# Patient Record
Sex: MALE | Race: BLACK OR AFRICAN AMERICAN | NOT HISPANIC OR LATINO | Employment: FULL TIME | URBAN - METROPOLITAN AREA
[De-identification: names, ages, dates, MRNs, and addresses within clinical notes are randomized per-mention and may not be internally consistent; named-entity substitution may affect disease eponyms.]

---

## 2021-03-29 ENCOUNTER — TELEPHONE (OUTPATIENT)
Dept: INTERNAL MEDICINE | Facility: CLINIC | Age: 56
End: 2021-03-29

## 2021-03-31 ENCOUNTER — HOSPITAL ENCOUNTER (OUTPATIENT)
Dept: CARDIOLOGY | Facility: HOSPITAL | Age: 56
Discharge: HOME | End: 2021-03-31
Attending: FAMILY MEDICINE
Payer: MEDICAID

## 2021-03-31 ENCOUNTER — IMMUNIZATION (OUTPATIENT)
Dept: INTERNAL MEDICINE | Facility: CLINIC | Age: 56
End: 2021-03-31
Payer: MEDICAID

## 2021-03-31 ENCOUNTER — OFFICE VISIT (OUTPATIENT)
Dept: INTERNAL MEDICINE | Facility: CLINIC | Age: 56
End: 2021-03-31
Payer: MEDICAID

## 2021-03-31 ENCOUNTER — LAB VISIT (OUTPATIENT)
Dept: LAB | Facility: HOSPITAL | Age: 56
End: 2021-03-31
Attending: FAMILY MEDICINE
Payer: MEDICAID

## 2021-03-31 VITALS
WEIGHT: 163.38 LBS | RESPIRATION RATE: 18 BRPM | OXYGEN SATURATION: 97 % | TEMPERATURE: 98 F | HEART RATE: 94 BPM | SYSTOLIC BLOOD PRESSURE: 112 MMHG | HEIGHT: 73 IN | BODY MASS INDEX: 21.65 KG/M2 | DIASTOLIC BLOOD PRESSURE: 64 MMHG

## 2021-03-31 DIAGNOSIS — Z12.11 COLON CANCER SCREENING: ICD-10-CM

## 2021-03-31 DIAGNOSIS — Z23 NEED FOR VACCINATION: Primary | ICD-10-CM

## 2021-03-31 DIAGNOSIS — Z00.00 ROUTINE PHYSICAL EXAMINATION: ICD-10-CM

## 2021-03-31 DIAGNOSIS — R76.8 HEPATITIS C ANTIBODY POSITIVE IN BLOOD: Primary | ICD-10-CM

## 2021-03-31 DIAGNOSIS — Z00.00 ROUTINE PHYSICAL EXAMINATION: Primary | ICD-10-CM

## 2021-03-31 DIAGNOSIS — F10.10 ALCOHOL ABUSE: ICD-10-CM

## 2021-03-31 LAB
ALBUMIN SERPL BCP-MCNC: 3.4 G/DL (ref 3.5–5.2)
ALP SERPL-CCNC: 106 U/L (ref 55–135)
ALT SERPL W/O P-5'-P-CCNC: 79 U/L (ref 10–44)
ANION GAP SERPL CALC-SCNC: 9 MMOL/L (ref 8–16)
AST SERPL-CCNC: 145 U/L (ref 10–40)
BILIRUB SERPL-MCNC: 0.8 MG/DL (ref 0.1–1)
BUN SERPL-MCNC: 5 MG/DL (ref 6–20)
CALCIUM SERPL-MCNC: 9.1 MG/DL (ref 8.7–10.5)
CHLORIDE SERPL-SCNC: 100 MMOL/L (ref 95–110)
CHOLEST SERPL-MCNC: 155 MG/DL (ref 120–199)
CHOLEST/HDLC SERPL: 2.5 {RATIO} (ref 2–5)
CO2 SERPL-SCNC: 28 MMOL/L (ref 23–29)
CREAT SERPL-MCNC: 0.8 MG/DL (ref 0.5–1.4)
EST. GFR  (AFRICAN AMERICAN): >60 ML/MIN/1.73 M^2
EST. GFR  (NON AFRICAN AMERICAN): >60 ML/MIN/1.73 M^2
GLUCOSE SERPL-MCNC: 88 MG/DL (ref 70–110)
HDLC SERPL-MCNC: 63 MG/DL (ref 40–75)
HDLC SERPL: 40.6 % (ref 20–50)
LDLC SERPL CALC-MCNC: 83.6 MG/DL (ref 63–159)
NONHDLC SERPL-MCNC: 92 MG/DL
POTASSIUM SERPL-SCNC: 4.4 MMOL/L (ref 3.5–5.1)
PROT SERPL-MCNC: 8.7 G/DL (ref 6–8.4)
SODIUM SERPL-SCNC: 137 MMOL/L (ref 136–145)
TRIGL SERPL-MCNC: 42 MG/DL (ref 30–150)

## 2021-03-31 PROCEDURE — 86803 HEPATITIS C AB TEST: CPT | Performed by: FAMILY MEDICINE

## 2021-03-31 PROCEDURE — 99386 PR PREVENTIVE VISIT,NEW,40-64: ICD-10-PCS | Mod: S$PBB,,, | Performed by: FAMILY MEDICINE

## 2021-03-31 PROCEDURE — 91300 COVID-19, MRNA, LNP-S, PF, 30 MCG/0.3 ML DOSE VACCINE: CPT | Mod: PBBFAC

## 2021-03-31 PROCEDURE — 99999 PR PBB SHADOW E&M-EST. PATIENT-LVL III: ICD-10-PCS | Mod: PBBFAC,,, | Performed by: FAMILY MEDICINE

## 2021-03-31 PROCEDURE — 80053 COMPREHEN METABOLIC PANEL: CPT | Performed by: FAMILY MEDICINE

## 2021-03-31 PROCEDURE — 93010 ELECTROCARDIOGRAM REPORT: CPT | Mod: ,,, | Performed by: INTERNAL MEDICINE

## 2021-03-31 PROCEDURE — 99213 OFFICE O/P EST LOW 20 MIN: CPT | Mod: PBBFAC,25 | Performed by: FAMILY MEDICINE

## 2021-03-31 PROCEDURE — 36415 COLL VENOUS BLD VENIPUNCTURE: CPT | Performed by: FAMILY MEDICINE

## 2021-03-31 PROCEDURE — 86703 HIV-1/HIV-2 1 RESULT ANTBDY: CPT | Performed by: FAMILY MEDICINE

## 2021-03-31 PROCEDURE — 99386 PREV VISIT NEW AGE 40-64: CPT | Mod: S$PBB,,, | Performed by: FAMILY MEDICINE

## 2021-03-31 PROCEDURE — 93005 ELECTROCARDIOGRAM TRACING: CPT

## 2021-03-31 PROCEDURE — 99999 PR PBB SHADOW E&M-EST. PATIENT-LVL III: CPT | Mod: PBBFAC,,, | Performed by: FAMILY MEDICINE

## 2021-03-31 PROCEDURE — 93010 EKG 12-LEAD: ICD-10-PCS | Mod: ,,, | Performed by: INTERNAL MEDICINE

## 2021-03-31 PROCEDURE — 80061 LIPID PANEL: CPT | Performed by: FAMILY MEDICINE

## 2021-04-01 ENCOUNTER — TELEPHONE (OUTPATIENT)
Dept: ENDOSCOPY | Facility: HOSPITAL | Age: 56
End: 2021-04-01

## 2021-04-01 PROBLEM — F10.10 ALCOHOL ABUSE: Status: ACTIVE | Noted: 2021-04-01

## 2021-04-01 LAB
HCV AB SERPL QL IA: POSITIVE
HIV 1+2 AB+HIV1 P24 AG SERPL QL IA: NEGATIVE

## 2021-04-06 ENCOUNTER — TELEPHONE (OUTPATIENT)
Dept: INTERNAL MEDICINE | Facility: CLINIC | Age: 56
End: 2021-04-06

## 2021-04-21 ENCOUNTER — IMMUNIZATION (OUTPATIENT)
Dept: INTERNAL MEDICINE | Facility: CLINIC | Age: 56
End: 2021-04-21
Payer: MEDICAID

## 2021-04-21 DIAGNOSIS — Z23 NEED FOR VACCINATION: Primary | ICD-10-CM

## 2021-04-21 PROCEDURE — 0002A COVID-19, MRNA, LNP-S, PF, 30 MCG/0.3 ML DOSE VACCINE: CPT | Mod: PBBFAC

## 2021-04-21 PROCEDURE — 91300 COVID-19, MRNA, LNP-S, PF, 30 MCG/0.3 ML DOSE VACCINE: CPT | Mod: PBBFAC

## 2021-06-10 ENCOUNTER — OFFICE VISIT (OUTPATIENT)
Dept: URGENT CARE | Facility: CLINIC | Age: 56
End: 2021-06-10
Payer: MEDICAID

## 2021-06-10 ENCOUNTER — HOSPITAL ENCOUNTER (OUTPATIENT)
Dept: RADIOLOGY | Facility: CLINIC | Age: 56
Discharge: HOME | End: 2021-06-10
Attending: NURSE PRACTITIONER
Payer: MEDICAID

## 2021-06-10 VITALS
TEMPERATURE: 98 F | RESPIRATION RATE: 18 BRPM | BODY MASS INDEX: 22.53 KG/M2 | SYSTOLIC BLOOD PRESSURE: 114 MMHG | DIASTOLIC BLOOD PRESSURE: 62 MMHG | WEIGHT: 170 LBS | HEART RATE: 73 BPM | OXYGEN SATURATION: 97 % | HEIGHT: 73 IN

## 2021-06-10 DIAGNOSIS — M25.552 LEFT HIP PAIN: Primary | ICD-10-CM

## 2021-06-10 PROCEDURE — 73502 XR HIP WITH PELVIS WHEN PERFORMED, 2 OR 3 VIEWS LEFT: ICD-10-PCS | Mod: LT,S$GLB,, | Performed by: RADIOLOGY

## 2021-06-10 PROCEDURE — 73502 X-RAY EXAM HIP UNI 2-3 VIEWS: CPT | Mod: LT,S$GLB,, | Performed by: RADIOLOGY

## 2021-06-10 PROCEDURE — 99214 OFFICE O/P EST MOD 30 MIN: CPT | Mod: S$GLB,,, | Performed by: NURSE PRACTITIONER

## 2021-06-10 PROCEDURE — 99214 PR OFFICE/OUTPT VISIT, EST, LEVL IV, 30-39 MIN: ICD-10-PCS | Mod: S$GLB,,, | Performed by: NURSE PRACTITIONER

## 2021-06-10 RX ORDER — KETOROLAC TROMETHAMINE 30 MG/ML
30 INJECTION, SOLUTION INTRAMUSCULAR; INTRAVENOUS
Status: DISCONTINUED | OUTPATIENT
Start: 2021-06-10 | End: 2021-06-10

## 2021-06-10 RX ORDER — MELOXICAM 7.5 MG/1
7.5 TABLET ORAL DAILY
Qty: 7 TABLET | Refills: 0 | Status: SHIPPED | OUTPATIENT
Start: 2021-06-10 | End: 2021-06-17

## 2021-06-10 RX ORDER — KETOROLAC TROMETHAMINE 30 MG/ML
30 INJECTION, SOLUTION INTRAMUSCULAR; INTRAVENOUS
Status: COMPLETED | OUTPATIENT
Start: 2021-06-10 | End: 2021-06-10

## 2021-06-10 RX ADMIN — KETOROLAC TROMETHAMINE 30 MG: 30 INJECTION, SOLUTION INTRAMUSCULAR; INTRAVENOUS at 12:06

## 2021-06-13 ENCOUNTER — TELEPHONE (OUTPATIENT)
Dept: URGENT CARE | Facility: CLINIC | Age: 56
End: 2021-06-13

## 2021-07-14 ENCOUNTER — TELEPHONE (OUTPATIENT)
Dept: INTERNAL MEDICINE | Facility: CLINIC | Age: 56
End: 2021-07-14

## 2021-07-14 ENCOUNTER — TELEPHONE (OUTPATIENT)
Dept: URGENT CARE | Facility: CLINIC | Age: 56
End: 2021-07-14

## 2021-07-14 ENCOUNTER — TELEPHONE (OUTPATIENT)
Dept: RHEUMATOLOGY | Facility: CLINIC | Age: 56
End: 2021-07-14

## 2021-07-14 ENCOUNTER — OFFICE VISIT (OUTPATIENT)
Dept: URGENT CARE | Facility: CLINIC | Age: 56
End: 2021-07-14
Payer: MEDICAID

## 2021-07-14 VITALS
DIASTOLIC BLOOD PRESSURE: 68 MMHG | OXYGEN SATURATION: 98 % | SYSTOLIC BLOOD PRESSURE: 118 MMHG | TEMPERATURE: 98 F | HEART RATE: 55 BPM | RESPIRATION RATE: 20 BRPM

## 2021-07-14 DIAGNOSIS — M25.559 HIP PAIN: ICD-10-CM

## 2021-07-14 DIAGNOSIS — M46.1 SACROILIITIS: Primary | ICD-10-CM

## 2021-07-14 DIAGNOSIS — L84 CALLUS OF FOOT: Primary | ICD-10-CM

## 2021-07-14 PROBLEM — M25.569 PAIN IN JOINT, LOWER LEG: Status: ACTIVE | Noted: 2017-02-18

## 2021-07-14 PROBLEM — G89.28 CHRONIC POSTOPERATIVE PAIN: Status: ACTIVE | Noted: 2019-11-25

## 2021-07-14 PROBLEM — Z98.890 HISTORY OF CRANIOTOMY: Status: ACTIVE | Noted: 2019-11-25

## 2021-07-14 PROCEDURE — 99213 PR OFFICE/OUTPT VISIT, EST, LEVL III, 20-29 MIN: ICD-10-PCS | Mod: S$GLB,,, | Performed by: EMERGENCY MEDICINE

## 2021-07-14 PROCEDURE — 99213 OFFICE O/P EST LOW 20 MIN: CPT | Mod: S$GLB,,, | Performed by: EMERGENCY MEDICINE

## 2021-07-14 RX ORDER — KETOROLAC TROMETHAMINE 10 MG/1
10 TABLET, FILM COATED ORAL EVERY 6 HOURS
Qty: 20 TABLET | Refills: 0 | Status: SHIPPED | OUTPATIENT
Start: 2021-07-14 | End: 2021-07-19 | Stop reason: SDUPTHER

## 2021-07-14 RX ORDER — KETOROLAC TROMETHAMINE 30 MG/ML
30 INJECTION, SOLUTION INTRAMUSCULAR; INTRAVENOUS
Status: COMPLETED | OUTPATIENT
Start: 2021-07-14 | End: 2021-07-14

## 2021-07-14 RX ORDER — DICLOFENAC SODIUM 10 MG/G
4 GEL TOPICAL 4 TIMES DAILY
Qty: 4 TUBE | Refills: 3 | Status: SHIPPED | OUTPATIENT
Start: 2021-07-14 | End: 2022-04-24 | Stop reason: SDUPTHER

## 2021-07-14 RX ADMIN — KETOROLAC TROMETHAMINE 30 MG: 30 INJECTION, SOLUTION INTRAMUSCULAR; INTRAVENOUS at 12:07

## 2021-07-16 ENCOUNTER — TELEPHONE (OUTPATIENT)
Dept: INTERNAL MEDICINE | Facility: CLINIC | Age: 56
End: 2021-07-16

## 2021-07-19 ENCOUNTER — OFFICE VISIT (OUTPATIENT)
Dept: INTERNAL MEDICINE | Facility: CLINIC | Age: 56
End: 2021-07-19
Payer: MEDICAID

## 2021-07-19 DIAGNOSIS — Z12.5 SCREENING PSA (PROSTATE SPECIFIC ANTIGEN): ICD-10-CM

## 2021-07-19 DIAGNOSIS — R53.83 FATIGUE, UNSPECIFIED TYPE: ICD-10-CM

## 2021-07-19 DIAGNOSIS — R76.8 HEPATITIS C ANTIBODY POSITIVE IN BLOOD: ICD-10-CM

## 2021-07-19 DIAGNOSIS — Z11.3 ROUTINE SCREENING FOR STI (SEXUALLY TRANSMITTED INFECTION): ICD-10-CM

## 2021-07-19 DIAGNOSIS — M19.90 ARTHRITIS: Primary | ICD-10-CM

## 2021-07-19 DIAGNOSIS — R45.86 MOOD CHANGES: ICD-10-CM

## 2021-07-19 DIAGNOSIS — M25.559 HIP PAIN: ICD-10-CM

## 2021-07-19 PROCEDURE — 99213 PR OFFICE/OUTPT VISIT, EST, LEVL III, 20-29 MIN: ICD-10-PCS | Mod: 95,,, | Performed by: FAMILY MEDICINE

## 2021-07-19 PROCEDURE — 99213 OFFICE O/P EST LOW 20 MIN: CPT | Mod: 95,,, | Performed by: FAMILY MEDICINE

## 2021-07-19 RX ORDER — KETOROLAC TROMETHAMINE 10 MG/1
10 TABLET, FILM COATED ORAL 3 TIMES DAILY
Qty: 20 TABLET | Refills: 0 | Status: SHIPPED | OUTPATIENT
Start: 2021-07-19 | End: 2021-07-26

## 2021-07-26 ENCOUNTER — TELEPHONE (OUTPATIENT)
Dept: PODIATRY | Facility: CLINIC | Age: 56
End: 2021-07-26

## 2021-07-26 ENCOUNTER — PATIENT OUTREACH (OUTPATIENT)
Dept: ADMINISTRATIVE | Facility: OTHER | Age: 56
End: 2021-07-26

## 2021-07-26 ENCOUNTER — TELEPHONE (OUTPATIENT)
Dept: GASTROENTEROLOGY | Facility: CLINIC | Age: 56
End: 2021-07-26

## 2021-08-03 ENCOUNTER — TELEPHONE (OUTPATIENT)
Dept: GASTROENTEROLOGY | Facility: CLINIC | Age: 56
End: 2021-08-03

## 2021-08-03 ENCOUNTER — TELEPHONE (OUTPATIENT)
Dept: PODIATRY | Facility: CLINIC | Age: 56
End: 2021-08-03

## 2021-08-26 ENCOUNTER — PATIENT OUTREACH (OUTPATIENT)
Dept: ADMINISTRATIVE | Facility: OTHER | Age: 56
End: 2021-08-26

## 2021-08-27 ENCOUNTER — OFFICE VISIT (OUTPATIENT)
Dept: GASTROENTEROLOGY | Facility: CLINIC | Age: 56
End: 2021-08-27
Payer: MEDICAID

## 2021-08-27 ENCOUNTER — PATIENT MESSAGE (OUTPATIENT)
Dept: GASTROENTEROLOGY | Facility: CLINIC | Age: 56
End: 2021-08-27

## 2021-08-27 ENCOUNTER — LAB VISIT (OUTPATIENT)
Dept: LAB | Facility: HOSPITAL | Age: 56
End: 2021-08-27
Attending: FAMILY MEDICINE
Payer: MEDICAID

## 2021-08-27 ENCOUNTER — SPECIALTY PHARMACY (OUTPATIENT)
Dept: PHARMACY | Facility: CLINIC | Age: 56
End: 2021-08-27

## 2021-08-27 DIAGNOSIS — R76.8 HEPATITIS C ANTIBODY POSITIVE IN BLOOD: ICD-10-CM

## 2021-08-27 DIAGNOSIS — R45.86 MOOD CHANGES: ICD-10-CM

## 2021-08-27 DIAGNOSIS — Z11.3 ROUTINE SCREENING FOR STI (SEXUALLY TRANSMITTED INFECTION): ICD-10-CM

## 2021-08-27 DIAGNOSIS — B18.2 CHRONIC HEPATITIS C WITHOUT HEPATIC COMA: ICD-10-CM

## 2021-08-27 DIAGNOSIS — R53.83 FATIGUE, UNSPECIFIED TYPE: ICD-10-CM

## 2021-08-27 DIAGNOSIS — B19.20 HEPATITIS C VIRUS INFECTION WITHOUT HEPATIC COMA, UNSPECIFIED CHRONICITY: Primary | ICD-10-CM

## 2021-08-27 DIAGNOSIS — Z12.5 SCREENING PSA (PROSTATE SPECIFIC ANTIGEN): ICD-10-CM

## 2021-08-27 LAB — COMPLEXED PSA SERPL-MCNC: 2.9 NG/ML (ref 0–4)

## 2021-08-27 PROCEDURE — 99204 PR OFFICE/OUTPT VISIT, NEW, LEVL IV, 45-59 MIN: ICD-10-PCS | Mod: 95,,, | Performed by: INTERNAL MEDICINE

## 2021-08-27 PROCEDURE — 87522 HEPATITIS C REVRS TRNSCRPJ: CPT | Performed by: FAMILY MEDICINE

## 2021-08-27 PROCEDURE — 36415 COLL VENOUS BLD VENIPUNCTURE: CPT | Performed by: FAMILY MEDICINE

## 2021-08-27 PROCEDURE — 87340 HEPATITIS B SURFACE AG IA: CPT | Performed by: FAMILY MEDICINE

## 2021-08-27 PROCEDURE — 84153 ASSAY OF PSA TOTAL: CPT | Performed by: FAMILY MEDICINE

## 2021-08-27 PROCEDURE — 87902 NFCT AGT GNTYP ALYS HEP C: CPT | Performed by: FAMILY MEDICINE

## 2021-08-27 PROCEDURE — 87350 HEPATITIS BE AG IA: CPT | Performed by: FAMILY MEDICINE

## 2021-08-27 PROCEDURE — 84403 ASSAY OF TOTAL TESTOSTERONE: CPT | Performed by: FAMILY MEDICINE

## 2021-08-27 PROCEDURE — 99204 OFFICE O/P NEW MOD 45 MIN: CPT | Mod: 95,,, | Performed by: INTERNAL MEDICINE

## 2021-08-27 PROCEDURE — 86592 SYPHILIS TEST NON-TREP QUAL: CPT | Performed by: FAMILY MEDICINE

## 2021-08-27 PROCEDURE — 87389 HIV-1 AG W/HIV-1&-2 AB AG IA: CPT | Performed by: FAMILY MEDICINE

## 2021-08-27 RX ORDER — VELPATASVIR AND SOFOSBUVIR 100; 400 MG/1; MG/1
1 TABLET, FILM COATED ORAL DAILY
Qty: 30 TABLET | Refills: 2 | Status: SHIPPED | OUTPATIENT
Start: 2021-08-27 | End: 2021-11-11

## 2021-08-28 LAB — RPR SER QL: NORMAL

## 2021-08-30 LAB — HBV E AG SERPL QL IA: NONREACTIVE

## 2021-08-31 ENCOUNTER — PATIENT MESSAGE (OUTPATIENT)
Dept: PODIATRY | Facility: CLINIC | Age: 56
End: 2021-08-31

## 2021-08-31 LAB
HBV SURFACE AG SERPL QL IA: NEGATIVE
HIV 1+2 AB+HIV1 P24 AG SERPL QL IA: NEGATIVE

## 2021-09-02 LAB
HCV GENTYP SERPL NAA+PROBE: ABNORMAL
HCV RNA SERPL NAA+PROBE-LOG IU: 5.68 LOG (10) IU/ML
HCV RNA SERPL QL NAA+PROBE: DETECTED
HCV RNA SPEC NAA+PROBE-ACNC: ABNORMAL IU/ML

## 2021-09-03 ENCOUNTER — OFFICE VISIT (OUTPATIENT)
Dept: PODIATRY | Facility: CLINIC | Age: 56
End: 2021-09-03
Payer: MEDICAID

## 2021-09-03 ENCOUNTER — PATIENT MESSAGE (OUTPATIENT)
Dept: INTERNAL MEDICINE | Facility: CLINIC | Age: 56
End: 2021-09-03

## 2021-09-03 ENCOUNTER — LAB VISIT (OUTPATIENT)
Dept: LAB | Facility: HOSPITAL | Age: 56
End: 2021-09-03
Attending: INTERNAL MEDICINE
Payer: MEDICAID

## 2021-09-03 ENCOUNTER — PATIENT MESSAGE (OUTPATIENT)
Dept: GASTROENTEROLOGY | Facility: CLINIC | Age: 56
End: 2021-09-03

## 2021-09-03 VITALS — WEIGHT: 170 LBS | HEIGHT: 73 IN | BODY MASS INDEX: 22.53 KG/M2

## 2021-09-03 DIAGNOSIS — R53.83 FATIGUE, UNSPECIFIED TYPE: ICD-10-CM

## 2021-09-03 DIAGNOSIS — L84 CORN OR CALLUS: ICD-10-CM

## 2021-09-03 DIAGNOSIS — B19.20 HEPATITIS C VIRUS INFECTION WITHOUT HEPATIC COMA, UNSPECIFIED CHRONICITY: ICD-10-CM

## 2021-09-03 DIAGNOSIS — R76.8 HEPATITIS C ANTIBODY TEST POSITIVE: Primary | ICD-10-CM

## 2021-09-03 DIAGNOSIS — R45.86 MOOD CHANGES: ICD-10-CM

## 2021-09-03 DIAGNOSIS — M79.672 LEFT FOOT PAIN: Primary | ICD-10-CM

## 2021-09-03 DIAGNOSIS — R79.89 LOW TESTOSTERONE: ICD-10-CM

## 2021-09-03 LAB
ALBUMIN SERPL-MCNC: 3.7 G/DL (ref 3.6–5.1)
BASOPHILS # BLD AUTO: 0.02 K/UL (ref 0–0.2)
BASOPHILS NFR BLD: 0.5 % (ref 0–1.9)
DIFFERENTIAL METHOD: ABNORMAL
EOSINOPHIL # BLD AUTO: 0.5 K/UL (ref 0–0.5)
EOSINOPHIL NFR BLD: 12.5 % (ref 0–8)
ERYTHROCYTE [DISTWIDTH] IN BLOOD BY AUTOMATED COUNT: 13.5 % (ref 11.5–14.5)
HCT VFR BLD AUTO: 36.6 % (ref 40–54)
HEPATITIS C VIRUS (HCV) RNA DETECTION/QUANTIFICATION RT-PCR: ABNORMAL IU/ML
HGB BLD-MCNC: 12.3 G/DL (ref 14–18)
IMM GRANULOCYTES # BLD AUTO: 0.01 K/UL (ref 0–0.04)
IMM GRANULOCYTES NFR BLD AUTO: 0.3 % (ref 0–0.5)
LYMPHOCYTES # BLD AUTO: 2 K/UL (ref 1–4.8)
LYMPHOCYTES NFR BLD: 51.2 % (ref 18–48)
MCH RBC QN AUTO: 33.9 PG (ref 27–31)
MCHC RBC AUTO-ENTMCNC: 33.6 G/DL (ref 32–36)
MCV RBC AUTO: 101 FL (ref 82–98)
MONOCYTES # BLD AUTO: 0.5 K/UL (ref 0.3–1)
MONOCYTES NFR BLD: 13.6 % (ref 4–15)
NEUTROPHILS # BLD AUTO: 0.8 K/UL (ref 1.8–7.7)
NEUTROPHILS NFR BLD: 21.9 % (ref 38–73)
NRBC BLD-RTO: 0 /100 WBC
PLATELET # BLD AUTO: 167 K/UL (ref 150–450)
PLATELET BLD QL SMEAR: ABNORMAL
PMV BLD AUTO: 12.1 FL (ref 9.2–12.9)
RBC # BLD AUTO: 3.63 M/UL (ref 4.6–6.2)
SHBG SERPL-SCNC: 148 NMOL/L (ref 10–50)
TESTOST FREE SERPL-MCNC: 16.4 PG/ML (ref 46–224)
TESTOST SERPL-MCNC: 488 NG/DL (ref 250–1100)
TESTOSTERONE.FREE+WB SERPL-MCNC: 28.1 NG/DL (ref 110–575)
WBC # BLD AUTO: 3.83 K/UL (ref 3.9–12.7)

## 2021-09-03 PROCEDURE — 36415 COLL VENOUS BLD VENIPUNCTURE: CPT | Performed by: INTERNAL MEDICINE

## 2021-09-03 PROCEDURE — 81596 NFCT DS CHRNC HCV 6 ASSAYS: CPT | Performed by: INTERNAL MEDICINE

## 2021-09-03 PROCEDURE — 99999 PR PBB SHADOW E&M-EST. PATIENT-LVL II: CPT | Mod: PBBFAC,,, | Performed by: PODIATRIST

## 2021-09-03 PROCEDURE — 99212 OFFICE O/P EST SF 10 MIN: CPT | Mod: PBBFAC | Performed by: PODIATRIST

## 2021-09-03 PROCEDURE — 99203 PR OFFICE/OUTPT VISIT, NEW, LEVL III, 30-44 MIN: ICD-10-PCS | Mod: S$PBB,,, | Performed by: PODIATRIST

## 2021-09-03 PROCEDURE — 85025 COMPLETE CBC W/AUTO DIFF WBC: CPT | Performed by: INTERNAL MEDICINE

## 2021-09-03 PROCEDURE — 99203 OFFICE O/P NEW LOW 30 MIN: CPT | Mod: S$PBB,,, | Performed by: PODIATRIST

## 2021-09-03 PROCEDURE — 99999 PR PBB SHADOW E&M-EST. PATIENT-LVL II: ICD-10-PCS | Mod: PBBFAC,,, | Performed by: PODIATRIST

## 2021-09-13 ENCOUNTER — SPECIALTY PHARMACY (OUTPATIENT)
Dept: PHARMACY | Facility: CLINIC | Age: 56
End: 2021-09-13

## 2021-09-13 ENCOUNTER — TELEPHONE (OUTPATIENT)
Dept: PHARMACY | Facility: CLINIC | Age: 56
End: 2021-09-13

## 2021-09-13 DIAGNOSIS — B18.2 CHRONIC HEPATITIS C WITHOUT HEPATIC COMA: Primary | ICD-10-CM

## 2021-09-15 LAB
A2 MACROGLOB SERPL-MCNC: 350 MG/DL (ref 100–280)
ALT SERPL W P-5'-P-CCNC: 55 U/L (ref 7–55)
APO A-I SERPL-MCNC: 134 MG/DL
BILIRUB SERPL-MCNC: 0.9 MG/DL
BIOPREDICTIVE SERIAL NUMBER: ABNORMAL
FIBROSIS STAGE SERPL QL: ABNORMAL
FIBROTEST INTERPRETATION: ABNORMAL
FIBROTEST-ACTITEST COMMENT: ABNORMAL
GGT SERPL-CCNC: 184 U/L (ref 8–61)
HAPTOGLOB SERPL-MCNC: 19 MG/DL (ref 30–200)
LIVER FIBR SCORE SERPL CALC.FIBROSURE: 0.92
NECROINFLAMMAT INTERP: ABNORMAL
NECROINFLAMMATORY ACT GRADE SERPL QL: ABNORMAL
NECROINFLAMMATORY ACT SCORE SERPL: 0.55

## 2021-09-17 ENCOUNTER — TELEPHONE (OUTPATIENT)
Dept: INTERNAL MEDICINE | Facility: CLINIC | Age: 56
End: 2021-09-17

## 2021-09-17 DIAGNOSIS — R79.89 LOW TESTOSTERONE: ICD-10-CM

## 2021-09-17 DIAGNOSIS — R53.83 FATIGUE, UNSPECIFIED TYPE: ICD-10-CM

## 2021-09-17 DIAGNOSIS — R45.86 MOOD CHANGES: Primary | ICD-10-CM

## 2021-09-23 ENCOUNTER — PATIENT MESSAGE (OUTPATIENT)
Dept: GASTROENTEROLOGY | Facility: CLINIC | Age: 56
End: 2021-09-23

## 2021-09-23 ENCOUNTER — IMMUNIZATION (OUTPATIENT)
Dept: PRIMARY CARE CLINIC | Facility: CLINIC | Age: 56
End: 2021-09-23
Payer: MEDICAID

## 2021-09-23 DIAGNOSIS — Z23 NEED FOR VACCINATION: Primary | ICD-10-CM

## 2021-09-23 PROCEDURE — 91300 COVID-19, MRNA, LNP-S, PF, 30 MCG/0.3 ML DOSE VACCINE: CPT | Mod: PBBFAC | Performed by: NURSE PRACTITIONER

## 2021-09-23 PROCEDURE — 0003A COVID-19, MRNA, LNP-S, PF, 30 MCG/0.3 ML DOSE VACCINE: CPT | Mod: CV19,PBBFAC | Performed by: NURSE PRACTITIONER

## 2021-09-28 ENCOUNTER — SPECIALTY PHARMACY (OUTPATIENT)
Dept: PHARMACY | Facility: CLINIC | Age: 56
End: 2021-09-28

## 2021-09-28 DIAGNOSIS — B18.2 CHRONIC HEPATITIS C WITHOUT HEPATIC COMA: Primary | ICD-10-CM

## 2021-10-06 ENCOUNTER — PATIENT MESSAGE (OUTPATIENT)
Dept: GASTROENTEROLOGY | Facility: CLINIC | Age: 56
End: 2021-10-06

## 2021-10-06 ENCOUNTER — TELEPHONE (OUTPATIENT)
Dept: UROLOGY | Facility: CLINIC | Age: 56
End: 2021-10-06

## 2021-10-06 ENCOUNTER — PATIENT MESSAGE (OUTPATIENT)
Dept: UROLOGY | Facility: CLINIC | Age: 56
End: 2021-10-06

## 2021-10-08 ENCOUNTER — SPECIALTY PHARMACY (OUTPATIENT)
Dept: PHARMACY | Facility: CLINIC | Age: 56
End: 2021-10-08

## 2021-10-08 DIAGNOSIS — B18.2 CHRONIC HEPATITIS C WITHOUT HEPATIC COMA: Primary | ICD-10-CM

## 2021-10-12 ENCOUNTER — TELEPHONE (OUTPATIENT)
Dept: HEPATOLOGY | Facility: CLINIC | Age: 56
End: 2021-10-12

## 2021-10-20 ENCOUNTER — PATIENT OUTREACH (OUTPATIENT)
Dept: ADMINISTRATIVE | Facility: HOSPITAL | Age: 56
End: 2021-10-20

## 2021-11-02 ENCOUNTER — SPECIALTY PHARMACY (OUTPATIENT)
Dept: PHARMACY | Facility: CLINIC | Age: 56
End: 2021-11-02
Payer: MEDICAID

## 2021-11-02 DIAGNOSIS — B18.2 CHRONIC HEPATITIS C WITHOUT HEPATIC COMA: Primary | ICD-10-CM

## 2021-11-11 ENCOUNTER — PATIENT MESSAGE (OUTPATIENT)
Dept: PHARMACY | Facility: CLINIC | Age: 56
End: 2021-11-11
Payer: MEDICAID

## 2021-11-30 ENCOUNTER — TELEPHONE (OUTPATIENT)
Dept: PODIATRY | Facility: CLINIC | Age: 56
End: 2021-11-30
Payer: MEDICAID

## 2021-12-06 ENCOUNTER — OFFICE VISIT (OUTPATIENT)
Dept: URGENT CARE | Facility: CLINIC | Age: 56
End: 2021-12-06
Payer: MEDICAID

## 2021-12-06 ENCOUNTER — HOSPITAL ENCOUNTER (OUTPATIENT)
Dept: RADIOLOGY | Facility: CLINIC | Age: 56
Discharge: HOME | End: 2021-12-06
Attending: NURSE PRACTITIONER
Payer: MEDICAID

## 2021-12-06 ENCOUNTER — HOSPITAL ENCOUNTER (OUTPATIENT)
Dept: RADIOLOGY | Facility: CLINIC | Age: 56
Discharge: HOME | End: 2021-12-06
Attending: NURSE PRACTITIONER

## 2021-12-06 VITALS
BODY MASS INDEX: 25.18 KG/M2 | WEIGHT: 190 LBS | TEMPERATURE: 99 F | SYSTOLIC BLOOD PRESSURE: 124 MMHG | RESPIRATION RATE: 18 BRPM | OXYGEN SATURATION: 95 % | HEIGHT: 73 IN | HEART RATE: 78 BPM | DIASTOLIC BLOOD PRESSURE: 75 MMHG

## 2021-12-06 DIAGNOSIS — G89.29 CHRONIC PAIN OF BOTH SHOULDERS: Primary | ICD-10-CM

## 2021-12-06 DIAGNOSIS — M25.512 CHRONIC PAIN OF BOTH SHOULDERS: ICD-10-CM

## 2021-12-06 DIAGNOSIS — M25.511 CHRONIC PAIN OF BOTH SHOULDERS: ICD-10-CM

## 2021-12-06 DIAGNOSIS — M25.511 CHRONIC PAIN OF BOTH SHOULDERS: Primary | ICD-10-CM

## 2021-12-06 DIAGNOSIS — S22.42XA CLOSED FRACTURE OF MULTIPLE RIBS OF LEFT SIDE, INITIAL ENCOUNTER: ICD-10-CM

## 2021-12-06 DIAGNOSIS — M25.512 CHRONIC PAIN OF BOTH SHOULDERS: Primary | ICD-10-CM

## 2021-12-06 DIAGNOSIS — G89.29 CHRONIC PAIN OF BOTH SHOULDERS: ICD-10-CM

## 2021-12-06 PROCEDURE — 73030 X-RAY EXAM OF SHOULDER: CPT | Mod: RT,S$GLB,, | Performed by: RADIOLOGY

## 2021-12-06 PROCEDURE — 73030 X-RAY EXAM OF SHOULDER: CPT | Mod: LT,S$GLB,, | Performed by: RADIOLOGY

## 2021-12-06 PROCEDURE — 99214 OFFICE O/P EST MOD 30 MIN: CPT | Mod: S$GLB,,, | Performed by: NURSE PRACTITIONER

## 2021-12-06 PROCEDURE — 73030 XR SHOULDER COMPLETE 2 OR MORE VIEWS LEFT: ICD-10-PCS | Mod: LT,S$GLB,, | Performed by: RADIOLOGY

## 2021-12-06 PROCEDURE — 99214 PR OFFICE/OUTPT VISIT, EST, LEVL IV, 30-39 MIN: ICD-10-PCS | Mod: S$GLB,,, | Performed by: NURSE PRACTITIONER

## 2021-12-06 PROCEDURE — 73030 XR SHOULDER COMPLETE 2 OR MORE VIEWS RIGHT: ICD-10-PCS | Mod: RT,S$GLB,, | Performed by: RADIOLOGY

## 2021-12-06 RX ORDER — KETOROLAC TROMETHAMINE 30 MG/ML
30 INJECTION, SOLUTION INTRAMUSCULAR; INTRAVENOUS
Status: COMPLETED | OUTPATIENT
Start: 2021-12-06 | End: 2021-12-06

## 2021-12-06 RX ADMIN — KETOROLAC TROMETHAMINE 30 MG: 30 INJECTION, SOLUTION INTRAMUSCULAR; INTRAVENOUS at 02:12

## 2021-12-09 ENCOUNTER — PATIENT MESSAGE (OUTPATIENT)
Dept: SPORTS MEDICINE | Facility: CLINIC | Age: 56
End: 2021-12-09
Payer: MEDICAID

## 2021-12-09 ENCOUNTER — TELEPHONE (OUTPATIENT)
Dept: SPORTS MEDICINE | Facility: CLINIC | Age: 56
End: 2021-12-09
Payer: MEDICAID

## 2021-12-09 ENCOUNTER — TELEPHONE (OUTPATIENT)
Dept: INTERNAL MEDICINE | Facility: CLINIC | Age: 56
End: 2021-12-09
Payer: MEDICAID

## 2021-12-09 ENCOUNTER — TELEPHONE (OUTPATIENT)
Dept: URGENT CARE | Facility: CLINIC | Age: 56
End: 2021-12-09
Payer: MEDICAID

## 2021-12-09 DIAGNOSIS — M25.511 BILATERAL SHOULDER PAIN, UNSPECIFIED CHRONICITY: Primary | ICD-10-CM

## 2021-12-09 DIAGNOSIS — M25.512 BILATERAL SHOULDER PAIN, UNSPECIFIED CHRONICITY: Primary | ICD-10-CM

## 2021-12-09 RX ORDER — TRAMADOL HYDROCHLORIDE 50 MG/1
50 TABLET ORAL EVERY 6 HOURS
Qty: 21 EACH | Refills: 0 | Status: SHIPPED | OUTPATIENT
Start: 2021-12-09 | End: 2023-01-23

## 2022-01-20 ENCOUNTER — TELEPHONE (OUTPATIENT)
Dept: PODIATRY | Facility: CLINIC | Age: 57
End: 2022-01-20
Payer: MEDICAID

## 2022-01-20 NOTE — TELEPHONE ENCOUNTER
----- Message from Janee Mary sent at 1/20/2022  2:02 PM CST -----  Contact: Pt @[phone]  Patient is returning a phone call.  Who left a message for the patient:  Lazara     Does patient know what this is regarding:  Yes     Would you like a call back, or a response through your [facility] portal?:   Call back     Comments:    Pt states that she is returning a missed call and requesting a call back.

## 2022-01-21 ENCOUNTER — PATIENT OUTREACH (OUTPATIENT)
Dept: ADMINISTRATIVE | Facility: OTHER | Age: 57
End: 2022-01-21
Payer: MEDICAID

## 2022-04-24 ENCOUNTER — HOSPITAL ENCOUNTER (OUTPATIENT)
Dept: RADIOLOGY | Facility: CLINIC | Age: 57
Discharge: HOME | End: 2022-04-24
Attending: PHYSICIAN ASSISTANT

## 2022-04-24 ENCOUNTER — OFFICE VISIT (OUTPATIENT)
Dept: URGENT CARE | Facility: CLINIC | Age: 57
End: 2022-04-24
Payer: MEDICAID

## 2022-04-24 VITALS
HEIGHT: 73 IN | HEART RATE: 73 BPM | WEIGHT: 185 LBS | TEMPERATURE: 99 F | OXYGEN SATURATION: 99 % | SYSTOLIC BLOOD PRESSURE: 110 MMHG | RESPIRATION RATE: 18 BRPM | BODY MASS INDEX: 24.52 KG/M2 | DIASTOLIC BLOOD PRESSURE: 69 MMHG

## 2022-04-24 DIAGNOSIS — G89.29 CHRONIC LEFT SHOULDER PAIN: ICD-10-CM

## 2022-04-24 DIAGNOSIS — G89.29 CHRONIC LEFT SHOULDER PAIN: Primary | ICD-10-CM

## 2022-04-24 DIAGNOSIS — M25.512 CHRONIC LEFT SHOULDER PAIN: ICD-10-CM

## 2022-04-24 DIAGNOSIS — L84 CALLUS OF FOOT: ICD-10-CM

## 2022-04-24 DIAGNOSIS — M25.512 CHRONIC LEFT SHOULDER PAIN: Primary | ICD-10-CM

## 2022-04-24 PROCEDURE — 99214 OFFICE O/P EST MOD 30 MIN: CPT | Mod: S$GLB,,, | Performed by: PHYSICIAN ASSISTANT

## 2022-04-24 PROCEDURE — 3008F PR BODY MASS INDEX (BMI) DOCUMENTED: ICD-10-PCS | Mod: CPTII,S$GLB,, | Performed by: PHYSICIAN ASSISTANT

## 2022-04-24 PROCEDURE — 1159F PR MEDICATION LIST DOCUMENTED IN MEDICAL RECORD: ICD-10-PCS | Mod: CPTII,S$GLB,, | Performed by: PHYSICIAN ASSISTANT

## 2022-04-24 PROCEDURE — 3078F DIAST BP <80 MM HG: CPT | Mod: CPTII,S$GLB,, | Performed by: PHYSICIAN ASSISTANT

## 2022-04-24 PROCEDURE — 1159F MED LIST DOCD IN RCRD: CPT | Mod: CPTII,S$GLB,, | Performed by: PHYSICIAN ASSISTANT

## 2022-04-24 PROCEDURE — 3078F PR MOST RECENT DIASTOLIC BLOOD PRESSURE < 80 MM HG: ICD-10-PCS | Mod: CPTII,S$GLB,, | Performed by: PHYSICIAN ASSISTANT

## 2022-04-24 PROCEDURE — 3074F SYST BP LT 130 MM HG: CPT | Mod: CPTII,S$GLB,, | Performed by: PHYSICIAN ASSISTANT

## 2022-04-24 PROCEDURE — 73030 XR SHOULDER COMPLETE 2 OR MORE VIEWS LEFT: ICD-10-PCS | Mod: LT,S$GLB,, | Performed by: RADIOLOGY

## 2022-04-24 PROCEDURE — 99214 PR OFFICE/OUTPT VISIT, EST, LEVL IV, 30-39 MIN: ICD-10-PCS | Mod: S$GLB,,, | Performed by: PHYSICIAN ASSISTANT

## 2022-04-24 PROCEDURE — 3074F PR MOST RECENT SYSTOLIC BLOOD PRESSURE < 130 MM HG: ICD-10-PCS | Mod: CPTII,S$GLB,, | Performed by: PHYSICIAN ASSISTANT

## 2022-04-24 PROCEDURE — 1160F PR REVIEW ALL MEDS BY PRESCRIBER/CLIN PHARMACIST DOCUMENTED: ICD-10-PCS | Mod: CPTII,S$GLB,, | Performed by: PHYSICIAN ASSISTANT

## 2022-04-24 PROCEDURE — 3008F BODY MASS INDEX DOCD: CPT | Mod: CPTII,S$GLB,, | Performed by: PHYSICIAN ASSISTANT

## 2022-04-24 PROCEDURE — 1160F RVW MEDS BY RX/DR IN RCRD: CPT | Mod: CPTII,S$GLB,, | Performed by: PHYSICIAN ASSISTANT

## 2022-04-24 PROCEDURE — 73030 X-RAY EXAM OF SHOULDER: CPT | Mod: LT,S$GLB,, | Performed by: RADIOLOGY

## 2022-04-24 RX ORDER — KETOROLAC TROMETHAMINE 30 MG/ML
30 INJECTION, SOLUTION INTRAMUSCULAR; INTRAVENOUS
Status: COMPLETED | OUTPATIENT
Start: 2022-04-24 | End: 2022-04-24

## 2022-04-24 RX ORDER — DICLOFENAC SODIUM 10 MG/G
4 GEL TOPICAL 4 TIMES DAILY
Qty: 4 EACH | Refills: 3 | Status: SHIPPED | OUTPATIENT
Start: 2022-04-24 | End: 2024-02-12

## 2022-04-24 RX ADMIN — KETOROLAC TROMETHAMINE 30 MG: 30 INJECTION, SOLUTION INTRAMUSCULAR; INTRAVENOUS at 01:04

## 2022-04-24 NOTE — PROGRESS NOTES
"Subjective:       Patient ID: Madhav Ruiz is a 56 y.o. male.    Vitals:  height is 6' 1" (1.854 m) and weight is 83.9 kg (185 lb). His temperature is 98.7 °F (37.1 °C). His blood pressure is 110/69 and his pulse is 73. His respiration is 18 and oxygen saturation is 99%.     Chief Complaint: Foot Swelling (Left foot)    Pt present for chronic calluses but has gotten worse these past two weeks. Pt states that he is having difficulty walking.  He is also experiencing increased left shoulder pain.  No trauma or injury to foot or shoulder.  He states he has tried Ibuprofen for pain with no relief.  Denies numbness or tingling.    Chart review shows missed or cancelled appts with Podiatry.  Ortho referral sent but patient unable to schedule due to personal schedule.    Edema  This is a new problem. The current episode started more than 1 month ago. The problem occurs constantly. The problem has been gradually worsening. Associated symptoms include arthralgias. Pertinent negatives include no abdominal pain, anorexia, change in bowel habit, chest pain, chills, congestion, coughing, diaphoresis, fatigue, fever, headaches, joint swelling, myalgias, nausea, neck pain, numbness, rash, sore throat, swollen glands, urinary symptoms, vertigo, visual change, vomiting or weakness. The symptoms are aggravated by walking.       Constitution: Negative for chills, sweating, fatigue and fever.   HENT: Negative for congestion and sore throat.    Neck: Negative for neck pain.   Cardiovascular: Negative for chest pain.   Respiratory: Negative for cough.    Gastrointestinal: Negative for abdominal pain, nausea and vomiting.   Musculoskeletal: Positive for joint pain, abnormal ROM of joint and arthritis. Negative for trauma, joint swelling and muscle ache.   Skin: Positive for skin thickening/induration. Negative for rash.   Neurological: Negative for history of vertigo, headaches and numbness.       Objective:      Physical Exam "   Constitutional: He is oriented to person, place, and time. He appears well-developed. He is cooperative. No distress.   HENT:   Head: Normocephalic and atraumatic.   Nose: Nose normal.   Mouth/Throat: Oropharynx is clear and moist and mucous membranes are normal.   Eyes: Conjunctivae and lids are normal.   Neck: Trachea normal and phonation normal. Neck supple.   Cardiovascular: Normal rate, regular rhythm and normal pulses.   Pulmonary/Chest: No respiratory distress.   Abdominal: Normal appearance. He exhibits no abdominal bruit and no pulsatile midline mass.   Musculoskeletal:         General: No deformity.      Left shoulder: He exhibits decreased range of motion and tenderness. He exhibits no swelling, no deformity, normal pulse and normal strength.        Feet:    Neurological: He is alert and oriented to person, place, and time. He has normal strength and normal reflexes. No sensory deficit.   Skin: Skin is warm, dry, intact and not diaphoretic.   Psychiatric: His speech is normal and behavior is normal. Judgment and thought content normal.   Nursing note and vitals reviewed.        Assessment:       1. Chronic left shoulder pain    2. Callus of foot          Plan:         Chronic left shoulder pain  -     X-Ray Shoulder 2 or More Views Left; Future; Expected date: 04/24/2022  -     ketorolac injection 30 mg  -     Ambulatory referral/consult to Orthopedics  -     diclofenac sodium (VOLTAREN) 1 % Gel; Apply 4 g topically 4 (four) times daily.  Dispense: 4 each; Refill: 3    Callus of foot  -     ketorolac injection 30 mg         X-Ray Shoulder 2 or More Views Left    Result Date: 4/24/2022  EXAMINATION: XR SHOULDER COMPLETE 2 OR MORE VIEWS LEFT CLINICAL HISTORY: Pain in left shoulder TECHNIQUE: Two or three views of the left shoulder were performed. COMPARISON: Left shoulder series 12/06/2021 FINDINGS: Bones are well mineralized. Overall alignment is within normal limits. No displaced fracture, dislocation  or destructive osseous process.  Moderate degenerative change at the AC joint not simply progressed.  Minimal degenerative change at the inferior glenoid.  No subcutaneous emphysema or radiodense retained foreign body.  Left lung apex is clear.     No acute displaced fracture-dislocation identified. Electronically signed by: Musa Vazquez MD Date:    04/24/2022 Time:    13:40    Toradol IM given in clinic today for pain control.  No NSAIDs for 24 hours.  Voltaren gel sent to pharmacy for joint pain.  Follow up with podiatry.  Ortho referral sent for shoulder pain.  Phone number updated.  RTC or go to ER with new or worsening symptoms.

## 2022-04-25 ENCOUNTER — TELEPHONE (OUTPATIENT)
Dept: PODIATRY | Facility: CLINIC | Age: 57
End: 2022-04-25
Payer: MEDICAID

## 2022-04-25 ENCOUNTER — PATIENT MESSAGE (OUTPATIENT)
Dept: PODIATRY | Facility: CLINIC | Age: 57
End: 2022-04-25
Payer: MEDICAID

## 2022-04-27 ENCOUNTER — TELEPHONE (OUTPATIENT)
Dept: URGENT CARE | Facility: CLINIC | Age: 57
End: 2022-04-27
Payer: MEDICAID

## 2022-04-27 NOTE — TELEPHONE ENCOUNTER
----- Message from Piper Hodges MA sent at 4/25/2022  7:40 PM CDT -----  Patient called regarding medication sent over for shoulder pain, said he already have some want to know if he could get something stronger for the pain.     4/27/2022 at 923 am  Telephone call:  Unable to leave a VM due to mailbox being full.    Patient will need to see ortho for any additional medications for shoulder pain.    Bang Moore PA-C

## 2022-04-30 ENCOUNTER — PATIENT OUTREACH (OUTPATIENT)
Dept: ADMINISTRATIVE | Facility: OTHER | Age: 57
End: 2022-04-30
Payer: MEDICAID

## 2022-04-30 NOTE — PROGRESS NOTES
Health Maintenance Due   Topic Date Due    Pneumococcal Vaccines (Age 0-64) (1 - PCV) Never done    TETANUS VACCINE  Never done    Colorectal Cancer Screening  Never done    Shingles Vaccine (1 of 2) Never done    Influenza Vaccine (1) Never done    COVID-19 Vaccine (4 - Booster for Pfizer series) 12/23/2021     Updates were requested from care everywhere.  Chart was reviewed for overdue [facility]active [facility] Encounters ([facility]) topics (CRS, Breast Cancer Screening, Eye exam)  Health Maintenance has been updated.  LINKS immunization registry triggered.  Immunizations were reconciled.

## 2022-05-02 ENCOUNTER — OFFICE VISIT (OUTPATIENT)
Dept: PODIATRY | Facility: CLINIC | Age: 57
End: 2022-05-02
Payer: MEDICAID

## 2022-05-02 ENCOUNTER — PATIENT MESSAGE (OUTPATIENT)
Dept: INTERNAL MEDICINE | Facility: CLINIC | Age: 57
End: 2022-05-02
Payer: MEDICAID

## 2022-05-02 VITALS — HEIGHT: 73 IN | WEIGHT: 184.94 LBS | BODY MASS INDEX: 24.51 KG/M2

## 2022-05-02 DIAGNOSIS — M77.42 METATARSALGIA OF LEFT FOOT: ICD-10-CM

## 2022-05-02 DIAGNOSIS — M79.672 LEFT FOOT PAIN: Primary | ICD-10-CM

## 2022-05-02 DIAGNOSIS — L84 CORN OR CALLUS: ICD-10-CM

## 2022-05-02 PROCEDURE — 1160F RVW MEDS BY RX/DR IN RCRD: CPT | Mod: CPTII,,, | Performed by: PODIATRIST

## 2022-05-02 PROCEDURE — 1160F PR REVIEW ALL MEDS BY PRESCRIBER/CLIN PHARMACIST DOCUMENTED: ICD-10-PCS | Mod: CPTII,,, | Performed by: PODIATRIST

## 2022-05-02 PROCEDURE — 99999 PR PBB SHADOW E&M-EST. PATIENT-LVL II: CPT | Mod: PBBFAC,,, | Performed by: PODIATRIST

## 2022-05-02 PROCEDURE — 3008F PR BODY MASS INDEX (BMI) DOCUMENTED: ICD-10-PCS | Mod: CPTII,,, | Performed by: PODIATRIST

## 2022-05-02 PROCEDURE — 1159F MED LIST DOCD IN RCRD: CPT | Mod: CPTII,,, | Performed by: PODIATRIST

## 2022-05-02 PROCEDURE — 3008F BODY MASS INDEX DOCD: CPT | Mod: CPTII,,, | Performed by: PODIATRIST

## 2022-05-02 PROCEDURE — 99213 PR OFFICE/OUTPT VISIT, EST, LEVL III, 20-29 MIN: ICD-10-PCS | Mod: S$PBB,,, | Performed by: PODIATRIST

## 2022-05-02 PROCEDURE — 99212 OFFICE O/P EST SF 10 MIN: CPT | Mod: PBBFAC | Performed by: PODIATRIST

## 2022-05-02 PROCEDURE — 1159F PR MEDICATION LIST DOCUMENTED IN MEDICAL RECORD: ICD-10-PCS | Mod: CPTII,,, | Performed by: PODIATRIST

## 2022-05-02 PROCEDURE — 99999 PR PBB SHADOW E&M-EST. PATIENT-LVL II: ICD-10-PCS | Mod: PBBFAC,,, | Performed by: PODIATRIST

## 2022-05-02 PROCEDURE — 99213 OFFICE O/P EST LOW 20 MIN: CPT | Mod: S$PBB,,, | Performed by: PODIATRIST

## 2022-05-02 NOTE — PROGRESS NOTES
"  Subjective:       Patient ID: Madhav Ruiz is a 57 y.o. male.    Chief Complaint: Callouses (C/o callous on bottom of left foot, rates pain 10/10, nondiabetic, wearing socks and tennis shoes, last seen PCP Dr. Chávez on 07/19/2021. )    HPI: Madhav Ruiz presents to the office today, with areas of concern to the plantar aspect of the right left foot.  States that it feels as if he is walking on rocks.  Denies any puncture wounds or injuries.  States this pain is been ongoing for some time without significant improvement.  No specific memory of walking barefoot or causing injury.  States 10/10 pain.  States visualization of new developed lesions to the foot.  No signs of acute infection.    Review of patient's allergies indicates:  No Known Allergies    Past Medical History:   Diagnosis Date    History of alcohol abuse        History reviewed. No pertinent family history.    Social History     Socioeconomic History    Marital status:    Tobacco Use    Smoking status: Current Every Day Smoker     Packs/day: 0.50     Types: Cigarettes    Smokeless tobacco: Never Used   Substance and Sexual Activity    Alcohol use: Yes     Alcohol/week: 20.0 standard drinks     Types: 20 Shots of liquor per week    Drug use: Never    Sexual activity: Yes     Partners: Female       Past Surgical History:   Procedure Laterality Date    head procedure         Review of Systems       Objective:   Ht 6' 1" (1.854 m)   Wt 83.9 kg (184 lb 15.5 oz)   BMI 24.40 kg/m²     X-Ray Shoulder 2 or More Views Left  Narrative: EXAMINATION:  XR SHOULDER COMPLETE 2 OR MORE VIEWS LEFT    CLINICAL HISTORY:  Pain in left shoulder    TECHNIQUE:  Two or three views of the left shoulder were performed.    COMPARISON:  Left shoulder series 12/06/2021    FINDINGS:  Bones are well mineralized. Overall alignment is within normal limits. No displaced fracture, dislocation or destructive osseous process.  Moderate degenerative change at the AC joint not " simply progressed.  Minimal degenerative change at the inferior glenoid.  No subcutaneous emphysema or radiodense retained foreign body.  Left lung apex is clear.  Impression: No acute displaced fracture-dislocation identified.    Electronically signed by: Musa Vazquez MD  Date:    04/24/2022  Time:    13:40       Physical Exam   LOWER EXTREMITY PHYSICAL EXAMINATION    Vascular:  The right dorsalis pedis pulse 2/4 and the right posterior tibial pulse 2/4.  The left dorsalis pedis pulse 2/4 and posterior tibial pulse on the left is 2/4.  Capillary refill is intact.  Pedal hair growth intact     Neurological:  Sharp/dull, light touch, proprioception all intact and equal bilaterally.  Vibratory sensation is intact.  Protective sensation intact     Musculoskeletal: Manual Muscle Testing is 5/5 with dorsiflexion, plantar flexion, abduction, and adduction.   There is normal range of motion in the forefoot, hindfoot, and Ankle joint.  Pain on palpation of the plantar aspect of the 1st and 2nd metatarsophalangeal joint on the left foot.  Pain with range of motion.  Pain noted plantarly on palpation of the foot.  Dermatological:  Skin is supple and moist.  There is 1 lesion present to the plantar aspect of the foot which have a resemblance to a plantar porokeratosis.  Centralize core is noted.  There is no acute signs of infection.  No signs of underlying ulceration.  There is no absence of skin line.  No obvious capillary hemorrhaging.    Assessment:     1. Left foot pain    2. Metatarsalgia of left foot    3. Corn or callus        Plan:     Left foot pain    Metatarsalgia of left foot    Corn or callus      Thorough discussion is had with the patient today, concerning the diagnosis, its etiology, and the treatment algorithm at present.    Foot counseling and education is provided at this visit. Patient is advised to wear socks and shoes at all times.  Do not walk barefoot, or with just socks, even when indoors.  Be sure to  check and inspect the inside of the shoe before putting them on her feet.  Protect your feet at all times.  Walking shoes and/or athletic shoes are the best types of shoe gear. Do not wear vinyl or plastic type shoe gear, as they do not stretch and/or breathe.  Protect your feet from hot and/or cold. Elevate the extremities when sitting.  Do not wear excessively tight socks and/or shoe gear. Wiggle your toes for a few minutes throughout the day. Move your ankles up and down, in and out, to help blood flow in your lower extremity.    Discussed the importance of elevating the area to prevent recurrent pressure to this area.  Also discussed the importance of offloading the metatarsal heads and porokeratotic lesion.        No future appointments.

## 2022-05-10 ENCOUNTER — PATIENT MESSAGE (OUTPATIENT)
Dept: ORTHOPEDICS | Facility: CLINIC | Age: 57
End: 2022-05-10
Payer: MEDICAID

## 2022-05-13 ENCOUNTER — PATIENT MESSAGE (OUTPATIENT)
Dept: ORTHOPEDICS | Facility: CLINIC | Age: 57
End: 2022-05-13
Payer: MEDICAID

## 2022-06-15 ENCOUNTER — TELEPHONE (OUTPATIENT)
Dept: PODIATRY | Facility: CLINIC | Age: 57
End: 2022-06-15
Payer: MEDICAID

## 2022-09-19 ENCOUNTER — PATIENT OUTREACH (OUTPATIENT)
Dept: ADMINISTRATIVE | Facility: HOSPITAL | Age: 57
End: 2022-09-19
Payer: MEDICAID

## 2022-10-12 ENCOUNTER — TELEPHONE (OUTPATIENT)
Dept: ORTHOPEDICS | Facility: CLINIC | Age: 57
End: 2022-10-12
Payer: MEDICAID

## 2022-10-12 DIAGNOSIS — M25.561 PAIN IN BOTH KNEES, UNSPECIFIED CHRONICITY: Primary | ICD-10-CM

## 2022-10-12 DIAGNOSIS — M25.562 PAIN IN BOTH KNEES, UNSPECIFIED CHRONICITY: Primary | ICD-10-CM

## 2022-10-13 ENCOUNTER — OFFICE VISIT (OUTPATIENT)
Dept: ORTHOPEDICS | Facility: CLINIC | Age: 57
End: 2022-10-13
Payer: MEDICAID

## 2022-10-13 ENCOUNTER — HOSPITAL ENCOUNTER (OUTPATIENT)
Dept: RADIOLOGY | Facility: HOSPITAL | Age: 57
Discharge: HOME | End: 2022-10-13
Attending: STUDENT IN AN ORGANIZED HEALTH CARE EDUCATION/TRAINING PROGRAM
Payer: MEDICAID

## 2022-10-13 ENCOUNTER — TELEPHONE (OUTPATIENT)
Dept: INTERNAL MEDICINE | Facility: CLINIC | Age: 57
End: 2022-10-13
Payer: MEDICAID

## 2022-10-13 VITALS — BODY MASS INDEX: 24.39 KG/M2 | HEIGHT: 73 IN | WEIGHT: 184 LBS

## 2022-10-13 DIAGNOSIS — M17.0 PRIMARY OSTEOARTHRITIS OF BOTH KNEES: Primary | ICD-10-CM

## 2022-10-13 DIAGNOSIS — M25.562 PAIN IN BOTH KNEES, UNSPECIFIED CHRONICITY: ICD-10-CM

## 2022-10-13 DIAGNOSIS — M25.561 PAIN IN BOTH KNEES, UNSPECIFIED CHRONICITY: ICD-10-CM

## 2022-10-13 PROCEDURE — 99213 OFFICE O/P EST LOW 20 MIN: CPT | Mod: PBBFAC,25 | Performed by: STUDENT IN AN ORGANIZED HEALTH CARE EDUCATION/TRAINING PROGRAM

## 2022-10-13 PROCEDURE — 1160F RVW MEDS BY RX/DR IN RCRD: CPT | Mod: CPTII,,, | Performed by: STUDENT IN AN ORGANIZED HEALTH CARE EDUCATION/TRAINING PROGRAM

## 2022-10-13 PROCEDURE — 20610 DRAIN/INJ JOINT/BURSA W/O US: CPT | Mod: 50,PBBFAC | Performed by: STUDENT IN AN ORGANIZED HEALTH CARE EDUCATION/TRAINING PROGRAM

## 2022-10-13 PROCEDURE — 73564 X-RAY EXAM KNEE 4 OR MORE: CPT | Mod: TC,50

## 2022-10-13 PROCEDURE — 99999 PR PBB SHADOW E&M-EST. PATIENT-LVL III: ICD-10-PCS | Mod: PBBFAC,,, | Performed by: STUDENT IN AN ORGANIZED HEALTH CARE EDUCATION/TRAINING PROGRAM

## 2022-10-13 PROCEDURE — 1160F PR REVIEW ALL MEDS BY PRESCRIBER/CLIN PHARMACIST DOCUMENTED: ICD-10-PCS | Mod: CPTII,,, | Performed by: STUDENT IN AN ORGANIZED HEALTH CARE EDUCATION/TRAINING PROGRAM

## 2022-10-13 PROCEDURE — 20610 LARGE JOINT ASPIRATION/INJECTION: BILATERAL KNEE: ICD-10-PCS | Mod: 50,S$PBB,, | Performed by: STUDENT IN AN ORGANIZED HEALTH CARE EDUCATION/TRAINING PROGRAM

## 2022-10-13 PROCEDURE — 1159F PR MEDICATION LIST DOCUMENTED IN MEDICAL RECORD: ICD-10-PCS | Mod: CPTII,,, | Performed by: STUDENT IN AN ORGANIZED HEALTH CARE EDUCATION/TRAINING PROGRAM

## 2022-10-13 PROCEDURE — 99204 OFFICE O/P NEW MOD 45 MIN: CPT | Mod: S$PBB,25,, | Performed by: STUDENT IN AN ORGANIZED HEALTH CARE EDUCATION/TRAINING PROGRAM

## 2022-10-13 PROCEDURE — 1159F MED LIST DOCD IN RCRD: CPT | Mod: CPTII,,, | Performed by: STUDENT IN AN ORGANIZED HEALTH CARE EDUCATION/TRAINING PROGRAM

## 2022-10-13 PROCEDURE — 99204 PR OFFICE/OUTPT VISIT, NEW, LEVL IV, 45-59 MIN: ICD-10-PCS | Mod: S$PBB,25,, | Performed by: STUDENT IN AN ORGANIZED HEALTH CARE EDUCATION/TRAINING PROGRAM

## 2022-10-13 PROCEDURE — 99999 PR PBB SHADOW E&M-EST. PATIENT-LVL III: CPT | Mod: PBBFAC,,, | Performed by: STUDENT IN AN ORGANIZED HEALTH CARE EDUCATION/TRAINING PROGRAM

## 2022-10-13 PROCEDURE — 73564 XR KNEE ORTHO BILAT WITH FLEXION: ICD-10-PCS | Mod: 26,50,, | Performed by: RADIOLOGY

## 2022-10-13 PROCEDURE — 73564 X-RAY EXAM KNEE 4 OR MORE: CPT | Mod: 26,50,, | Performed by: RADIOLOGY

## 2022-10-13 RX ORDER — TRIAMCINOLONE ACETONIDE 40 MG/ML
40 INJECTION, SUSPENSION INTRA-ARTICULAR; INTRAMUSCULAR
Status: DISCONTINUED | OUTPATIENT
Start: 2022-10-13 | End: 2022-10-13 | Stop reason: HOSPADM

## 2022-10-13 RX ADMIN — TRIAMCINOLONE ACETONIDE 40 MG: 200 INJECTION, SUSPENSION INTRA-ARTICULAR; INTRAMUSCULAR at 11:10

## 2022-10-13 NOTE — PATIENT INSTRUCTIONS
Assessment:  Madahv Ruiz is a 57 y.o. male   Chief Complaint   Patient presents with    Left Knee - Pain    Right Knee - Pain       Encounter Diagnosis   Name Primary?    Primary osteoarthritis of both knees Yes        Plan:  Cortisone injection to bilateral knees  We discussed the proper protocols after the injection such as no submerging pools, baths tubs, or hot tubs for 24 hr.  Showering is okay today.  We also discussed that blood sugars can be elevated after an injection and asked patient to properly checked her sugars over the next few days and contact their PCP if there are any concerns.  We discussed red flags such as fevers, chills, red, warm, tender joint at the area of injection to please seek medical care immediately.    Prior authorization for bilateral Durolane injections  Referral to Dr. Cavazos for consultation for TKA    Follow-up: 4 weeks or sooner if there are any problems between now and then.    Thank you for choosing [facility] and Dr. Noah Rae for your orthopedic & sports medicine care. It is our goal to provide you with exceptional care that will help keep you healthy, active, and get you back in the game.    Please do not hesitate to reach out to us via email, phone, or MyChart with any questions, concerns, or feedback.    If you felt that you received exemplary care today, please consider leaving us feedback on [employer] at:  [email or web address]    If you are experiencing pain/discomfort ,or have questions after 5pm and would like to be connected to the [facility] on-call team, please call this number and specify which Sports Medicine provider is treating you: [phone]

## 2022-10-13 NOTE — PROGRESS NOTES
Patient ID: Madhav Ruiz  YOB: 1965  MRN: [id number]    Chief Complaint: Pain of the Left Knee and Pain of the Right Knee      Referred By:  Self for chronic bilateral knee pain    History of Present Illness: Madhav Ruiz is a left-hand dominant 57 y.o. male who presents today with chronic bilateral knee pain for the past few years.  Endorses difficulty standing up and decreased strength.  Endorses anterior knee pain and crackling.  He had a torn meniscus back in 2015.  Pain is described as aching and constant.  He has tried Voltaren gel and oral anti-inflammatories which haven't helped.  Associated with pain at night and intermittent swelling.      The patient is active in none.  Occupation:  Maintenance      Past Medical History:   Past Medical History:   Diagnosis Date    History of alcohol abuse      Past Surgical History:   Procedure Laterality Date    head procedure       History reviewed. No pertinent family history.  Social History     Socioeconomic History    Marital status:    Tobacco Use    Smoking status: Every Day     Packs/day: 0.50     Types: Cigarettes    Smokeless tobacco: Never   Substance and Sexual Activity    Alcohol use: Yes     Alcohol/week: 20.0 standard drinks     Types: 20 Shots of liquor per week    Drug use: Never    Sexual activity: Yes     Partners: Female     Medication List with Changes/Refills   Current Medications    DICLOFENAC SODIUM (VOLTAREN) 1 % GEL    Apply 4 g topically 4 (four) times daily.    TRAMADOL (ULTRAM) 50 MG TABLET    Take 1 tablet (50 mg total) by mouth every 6 (six) hours.     Review of patient's allergies indicates:  No Known Allergies    Physical Exam:   Body mass index is 24.28 kg/m².    GENERAL: Well appearing, in no acute distress.  HEAD: Normocephalic and atraumatic.  ENT: External ears and nose grossly normal.  EYES: EOMI bilaterally  PULMONARY: Respirations are grossly even and non-labored.  NEURO: Awake, alert, and oriented x  3.  SKIN: No obvious rashes appreciated.  PSYCH: Mood & affect are appropriate.    Detailed MSK exam:     Right knee exam:   -ROM: extension -5, flexion 120  -TTP:  Medial and lateral joint lines  -effusion:  None  -Patellar apprehension negative  -Hermelinda test negative  -stable to varus and valgus stress tests  -Lachman test negative, anterior drawer test negative, posterior drawer test negative    Left knee exam:   -ROM: extension -5, flexion 120  -TTP:  None  -effusion:  None  -Patellar apprehension negative  -Hermelinda test negative  -stable to varus and valgus stress tests  -Lachman test negative, anterior drawer test negative, posterior drawer test negative      Imaging:  X-ray Knee Ortho Bilateral with Flexion  Narrative: EXAMINATION:  XR KNEE ORTHO BILAT WITH FLEXION    CLINICAL HISTORY:  Pain in right knee    TECHNIQUE:  AP standing of both knees, PA flexion standing views of both knees, and Merchant views of both knees were performed.  Lateral views of both knees were also performed.    COMPARISON:  None    FINDINGS:  Moderate degenerative osteoarthrosis of the bilateral medial compartments with joint space narrowing and osteophyte formation.    Bilateral patellofemoral articulation intact with mild degenerative osteoarthrosis.  Small right suprapatellar effusion.  No significant left suprapatellar effusion.  Impression: 1. Moderate degenerative osteoarthrosis bilateral medial compartments.  2. Small right suprapatellar effusion.    Electronically signed by: Remington Matta  Date:    10/13/2022  Time:    11:30        Relevant imaging results were reviewed and interpreted by me and per my read shows mild joint space narrowing.  This was discussed with the patient and / or family today.     Assessment:  Madhav Ruiz is a 57 y.o. male presenting with chronic bilateral knee pain.   History, physical and radiographs are consistent with a likely diagnosis of mild-to-moderate OA.   Plan: Steroid injections given  today (see separate procedure note for details).  Continue home exercises.  Start prior Auth for gel injections.  Knee specialist referral to discuss possible knee replacement surgery. Continue conservative management for pain.   Follow up in 4 weeks for gel injections. All questions answered.      Primary osteoarthritis of both knees  -     Large Joint Aspiration/Injection: bilateral knee  -     Ambulatory referral/consult to Orthopedics; Future; Expected date: 10/20/2022  -     Prior authorization Order     MEDICAL NECESSITY FOR VISCOSUPPLEMETNATION: After thorough evaluation of the patient, I have determined that visco-supplementation is medically necessary. The patient has painful degenerative changes of the knee with failure of conservative treatments including lifestyle modifications and rehabilitation exercises.  Oral analgesis/NSAIDs have not adequately controlled symptoms and there is radiographic evidence of Kellgren Sae grade 2 or greater osteoarthritic changes, or in lack of radiographic evidence, there is arthroscopic or other evidence of chondrosis.     A copy of today's visit note has been sent to the referring provider.     Electronically signed:  Noah Rae MD, MPH  10/13/2022  11:53 AM

## 2022-10-13 NOTE — TELEPHONE ENCOUNTER
----- Message from Michelle Mendez sent at 10/13/2022  3:07 PM CDT -----  Type:  Patient Returning Call    Who Called:patient  Who Left Message for Patient:Faith  Does the patient know what this is regarding?:na  Would the patient rather a call back or a response via [facility]? Call back  Best Call Back Number:[phone]  Additional Information: na

## 2022-10-13 NOTE — PROCEDURES
Large Joint Aspiration/Injection: bilateral knee    Date/Time: 10/13/2022 11:00 AM  Performed by: Noah Rae MD  Authorized by: Noah Rae MD     Consent Done?:  Yes (Verbal)  Indications:  Arthritis and pain  Site marked: the procedure site was marked    Timeout: prior to procedure the correct patient, procedure, and site was verified    Prep: patient was prepped and draped in usual sterile fashion    Local anesthetic:  Bupivacaine 0.5% without epinephrine and lidocaine 1% without epinephrine    Details:  Needle Size:  22 G  Approach:  Anterolateral  Location:  Knee  Laterality:  Bilateral  Site:  Bilateral knee  Medications (Right):  40 mg triamcinolone acetonide 40 mg/mL  Medications (Left):  40 mg triamcinolone acetonide 40 mg/mL  Patient tolerance:  Patient tolerated the procedure well with no immediate complications

## 2022-10-14 ENCOUNTER — TELEPHONE (OUTPATIENT)
Dept: DERMATOLOGY | Facility: CLINIC | Age: 57
End: 2022-10-14
Payer: MEDICAID

## 2022-10-14 NOTE — TELEPHONE ENCOUNTER
Patient's wife call inquiring about getting patient appointment with Dermatology. Informed we are not taking any new patient's until next year. Verbalized understanding.  Pt asked about getting transferred to podiatry.

## 2022-10-14 NOTE — TELEPHONE ENCOUNTER
----- Message from Fernando [address] sent at 10/14/2022  9:10 AM CDT -----  Contact: [phone]  Type:  Patient Returning Call    Who Called:Alana calling in regards to ariadne   Who Left Message for Patient:nurse   Does the patient know what this is regarding?:yes   Would the patient rather a call back or a response via [employer] [facility]? Call back   Best Call Back Number:[phone]  Additional Information:

## 2022-11-07 ENCOUNTER — TELEPHONE (OUTPATIENT)
Dept: ORTHOPEDICS | Facility: CLINIC | Age: 57
End: 2022-11-07
Payer: MEDICAID

## 2022-11-07 DIAGNOSIS — M17.0 PRIMARY OSTEOARTHRITIS OF BOTH KNEES: Primary | ICD-10-CM

## 2022-11-07 NOTE — TELEPHONE ENCOUNTER
Rescheduled patient to Friday at 4 pm.  Will contact patient to let them know if gel shots have been approved.

## 2022-11-10 ENCOUNTER — TELEPHONE (OUTPATIENT)
Dept: ORTHOPEDICS | Facility: CLINIC | Age: 57
End: 2022-11-10
Payer: MEDICAID

## 2022-11-10 NOTE — TELEPHONE ENCOUNTER
Thanks  ----- Message from Bridget Ronquillo sent at 11/10/2022  9:04 AM CST -----  Regarding: RE: Monovisc  Pt. Is approved , thanks  ----- Message -----  From: Rose Burton  Sent: 11/10/2022   7:25 AM CST  To: Bridget Ronquillo  Subject: Monovisc                                         Good morning,    Checking status of authorization on Monovisc injection for patient.  His appointment is tomorrow afternoon so needing to know if we need to reschedule.  Thanks for your help.    Rose Holden MS LAT, ATC, OTC  Sports Medicine Assistant - Dr. Michael Villasin [facility] [place] [facility] [place]

## 2022-11-14 ENCOUNTER — OFFICE VISIT (OUTPATIENT)
Dept: ORTHOPEDICS | Facility: CLINIC | Age: 57
End: 2022-11-14
Payer: MEDICAID

## 2022-11-14 ENCOUNTER — IMMUNIZATION (OUTPATIENT)
Dept: INTERNAL MEDICINE | Facility: CLINIC | Age: 57
End: 2022-11-14
Payer: MEDICAID

## 2022-11-14 DIAGNOSIS — M17.0 PRIMARY OSTEOARTHRITIS OF BOTH KNEES: Primary | ICD-10-CM

## 2022-11-14 PROCEDURE — 99499 UNLISTED E&M SERVICE: CPT | Mod: S$PBB,,, | Performed by: STUDENT IN AN ORGANIZED HEALTH CARE EDUCATION/TRAINING PROGRAM

## 2022-11-14 PROCEDURE — 1160F RVW MEDS BY RX/DR IN RCRD: CPT | Mod: CPTII,,, | Performed by: STUDENT IN AN ORGANIZED HEALTH CARE EDUCATION/TRAINING PROGRAM

## 2022-11-14 PROCEDURE — 99999 PR PBB SHADOW E&M-EST. PATIENT-LVL II: ICD-10-PCS | Mod: PBBFAC,,, | Performed by: STUDENT IN AN ORGANIZED HEALTH CARE EDUCATION/TRAINING PROGRAM

## 2022-11-14 PROCEDURE — 20610 DRAIN/INJ JOINT/BURSA W/O US: CPT | Mod: 50,PBBFAC,PO | Performed by: STUDENT IN AN ORGANIZED HEALTH CARE EDUCATION/TRAINING PROGRAM

## 2022-11-14 PROCEDURE — 99212 OFFICE O/P EST SF 10 MIN: CPT | Mod: PBBFAC,PO,25 | Performed by: STUDENT IN AN ORGANIZED HEALTH CARE EDUCATION/TRAINING PROGRAM

## 2022-11-14 PROCEDURE — 90686 IIV4 VACC NO PRSV 0.5 ML IM: CPT | Mod: PBBFAC

## 2022-11-14 PROCEDURE — 1159F PR MEDICATION LIST DOCUMENTED IN MEDICAL RECORD: ICD-10-PCS | Mod: CPTII,,, | Performed by: STUDENT IN AN ORGANIZED HEALTH CARE EDUCATION/TRAINING PROGRAM

## 2022-11-14 PROCEDURE — 99999 PR PBB SHADOW E&M-EST. PATIENT-LVL II: CPT | Mod: PBBFAC,,, | Performed by: STUDENT IN AN ORGANIZED HEALTH CARE EDUCATION/TRAINING PROGRAM

## 2022-11-14 PROCEDURE — 1159F MED LIST DOCD IN RCRD: CPT | Mod: CPTII,,, | Performed by: STUDENT IN AN ORGANIZED HEALTH CARE EDUCATION/TRAINING PROGRAM

## 2022-11-14 PROCEDURE — 1160F PR REVIEW ALL MEDS BY PRESCRIBER/CLIN PHARMACIST DOCUMENTED: ICD-10-PCS | Mod: CPTII,,, | Performed by: STUDENT IN AN ORGANIZED HEALTH CARE EDUCATION/TRAINING PROGRAM

## 2022-11-14 PROCEDURE — 99499 NO LOS: ICD-10-PCS | Mod: S$PBB,,, | Performed by: STUDENT IN AN ORGANIZED HEALTH CARE EDUCATION/TRAINING PROGRAM

## 2022-11-14 PROCEDURE — 20610 LARGE JOINT ASPIRATION/INJECTION: BILATERAL KNEE: ICD-10-PCS | Mod: 50,S$PBB,, | Performed by: STUDENT IN AN ORGANIZED HEALTH CARE EDUCATION/TRAINING PROGRAM

## 2022-11-14 RX ORDER — METHOCARBAMOL 750 MG/1
TABLET, FILM COATED ORAL
COMMUNITY
Start: 2022-10-14 | End: 2023-01-23 | Stop reason: ALTCHOICE

## 2022-11-14 RX ADMIN — Medication 88 MG: at 11:11

## 2022-11-14 NOTE — PROCEDURES
Large Joint Aspiration/Injection: bilateral knee    Date/Time: 11/14/2022 11:00 AM  Performed by: Noah Rae MD  Authorized by: Noah Rae MD     Consent Done?:  Yes (Verbal)  Indications:  Arthritis and pain  Site marked: the procedure site was marked    Timeout: prior to procedure the correct patient, procedure, and site was verified    Prep: patient was prepped and draped in usual sterile fashion      Details:  Needle Size:  22 G  Approach:  Anterolateral  Location:  Knee  Laterality:  Bilateral  Site:  Bilateral knee  Medications (Right):  88 mg hyaluronate sodium, stabilized 88 mg/4 mL  Medications (Left):  88 mg hyaluronate sodium, stabilized 88 mg/4 mL  Patient tolerance:  Patient tolerated the procedure well with no immediate complications

## 2023-01-23 ENCOUNTER — TELEPHONE (OUTPATIENT)
Dept: ORTHOPEDICS | Facility: CLINIC | Age: 58
End: 2023-01-23
Payer: MEDICAID

## 2023-01-23 ENCOUNTER — TELEPHONE (OUTPATIENT)
Dept: INTERNAL MEDICINE | Facility: CLINIC | Age: 58
End: 2023-01-23
Payer: MEDICAID

## 2023-01-23 ENCOUNTER — OFFICE VISIT (OUTPATIENT)
Dept: ORTHOPEDICS | Facility: CLINIC | Age: 58
End: 2023-01-23
Payer: MEDICAID

## 2023-01-23 ENCOUNTER — TELEPHONE (OUTPATIENT)
Dept: PODIATRY | Facility: CLINIC | Age: 58
End: 2023-01-23
Payer: MEDICAID

## 2023-01-23 VITALS — HEIGHT: 73 IN | BODY MASS INDEX: 28.91 KG/M2 | WEIGHT: 218.13 LBS

## 2023-01-23 DIAGNOSIS — M17.0 PRIMARY OSTEOARTHRITIS OF BOTH KNEES: ICD-10-CM

## 2023-01-23 DIAGNOSIS — M17.11 ARTHRITIS OF KNEE, RIGHT: Primary | ICD-10-CM

## 2023-01-23 DIAGNOSIS — M21.162 ACQUIRED VARUS DEFORMITY KNEE, LEFT: ICD-10-CM

## 2023-01-23 DIAGNOSIS — M17.12 ARTHRITIS OF KNEE, LEFT: ICD-10-CM

## 2023-01-23 DIAGNOSIS — M21.161 ACQUIRED VARUS DEFORMITY KNEE, RIGHT: ICD-10-CM

## 2023-01-23 PROCEDURE — 3008F BODY MASS INDEX DOCD: CPT | Mod: CPTII,,, | Performed by: ORTHOPAEDIC SURGERY

## 2023-01-23 PROCEDURE — 1160F RVW MEDS BY RX/DR IN RCRD: CPT | Mod: CPTII,,, | Performed by: ORTHOPAEDIC SURGERY

## 2023-01-23 PROCEDURE — 99214 OFFICE O/P EST MOD 30 MIN: CPT | Mod: S$PBB,,, | Performed by: ORTHOPAEDIC SURGERY

## 2023-01-23 PROCEDURE — 3008F PR BODY MASS INDEX (BMI) DOCUMENTED: ICD-10-PCS | Mod: CPTII,,, | Performed by: ORTHOPAEDIC SURGERY

## 2023-01-23 PROCEDURE — 99214 PR OFFICE/OUTPT VISIT, EST, LEVL IV, 30-39 MIN: ICD-10-PCS | Mod: S$PBB,,, | Performed by: ORTHOPAEDIC SURGERY

## 2023-01-23 PROCEDURE — 99999 PR PBB SHADOW E&M-EST. PATIENT-LVL III: CPT | Mod: PBBFAC,,, | Performed by: ORTHOPAEDIC SURGERY

## 2023-01-23 PROCEDURE — 1159F PR MEDICATION LIST DOCUMENTED IN MEDICAL RECORD: ICD-10-PCS | Mod: CPTII,,, | Performed by: ORTHOPAEDIC SURGERY

## 2023-01-23 PROCEDURE — 99999 PR PBB SHADOW E&M-EST. PATIENT-LVL III: ICD-10-PCS | Mod: PBBFAC,,, | Performed by: ORTHOPAEDIC SURGERY

## 2023-01-23 PROCEDURE — 1160F PR REVIEW ALL MEDS BY PRESCRIBER/CLIN PHARMACIST DOCUMENTED: ICD-10-PCS | Mod: CPTII,,, | Performed by: ORTHOPAEDIC SURGERY

## 2023-01-23 PROCEDURE — 1159F MED LIST DOCD IN RCRD: CPT | Mod: CPTII,,, | Performed by: ORTHOPAEDIC SURGERY

## 2023-01-23 PROCEDURE — 99213 OFFICE O/P EST LOW 20 MIN: CPT | Mod: PBBFAC | Performed by: ORTHOPAEDIC SURGERY

## 2023-01-23 RX ORDER — NABUMETONE 750 MG/1
750 TABLET, FILM COATED ORAL 2 TIMES DAILY PRN
Qty: 60 TABLET | Refills: 3 | Status: SHIPPED | OUTPATIENT
Start: 2023-01-23 | End: 2023-06-20

## 2023-01-23 RX ORDER — CYCLOBENZAPRINE HCL 10 MG
10 TABLET ORAL 2 TIMES DAILY PRN
Qty: 60 TABLET | Refills: 2 | Status: SHIPPED | OUTPATIENT
Start: 2023-01-23 | End: 2023-02-02

## 2023-01-23 RX ORDER — GABAPENTIN 300 MG/1
300 CAPSULE ORAL 2 TIMES DAILY
Qty: 60 CAPSULE | Refills: 11 | Status: SHIPPED | OUTPATIENT
Start: 2023-01-23 | End: 2023-11-20 | Stop reason: SDUPTHER

## 2023-01-23 NOTE — PROGRESS NOTES
Subjective:     Patient ID: Madhav Ruiz is a 57 y.o. male.    Chief Complaint: Pain of the Left Knee and Pain of the Right Knee  01/23/2023   HPI:  57-year-old with bilateral knee pain since 2015 where he stepped [place] a [place] in the park and twisted his knees.  He has been suffering from his knee and been treated with numerous medications including meloxicam and Aleve and Tylenol.  This right now became severe over the last year that he could not w relief for at least a week or ork.  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  Now it has been so painful that he can not do much in his pain is 8/10.  He is  with his wife who does 2 jobs right now in order to provide.  He never had any braces.  He did go to physical therapy without too much relief.  At 1 point he did try gabapentin but he is not too sure if he took it long enough.  On 10/13/2022 received steroid injections into both of his knees and on November 14 he received viscosupplementation.  He said both injections seems to have caused more pain for at least 10 days.  He does not want have his injections repeated he can.  He is trying to see if any different medications could be given to help him out.  He does have severe cramps in the legs at night but not during the day and he drinks pickle juice  You decline having any pain in his back at this time or in the groin.  Has no fever no chills no shortness of breath or difficulty with chewing or swallowing loss of bowel bladder control blurry vision double vision loss sense smell or taste  Past Medical History:   Diagnosis Date    History of alcohol abuse      Past Surgical History:   Procedure Laterality Date    head procedure       History reviewed. No pertinent family history.  Social History     Socioeconomic History    Marital status:    Tobacco Use    Smoking status: Every Day     Packs/day: 0.50     Types: Cigarettes    Smokeless tobacco: Never   Substance and Sexual Activity     Alcohol use: Yes     Alcohol/week: 20.0 standard drinks     Types: 20 Shots of liquor per week    Drug use: Never    Sexual activity: Yes     Partners: Female     Medication List with Changes/Refills   New Medications    CYCLOBENZAPRINE (FLEXERIL) 10 MG TABLET    Take 1 tablet (10 mg total) by mouth 2 (two) times daily as needed for Muscle spasms (Muscle spasm).    GABAPENTIN (NEURONTIN) 300 MG CAPSULE    Take 1 capsule (300 mg total) by mouth 2 (two) times daily.    NABUMETONE (RELAFEN) 750 MG TABLET    Take 1 tablet (750 mg total) by mouth 2 (two) times daily as needed for Pain. Take with food   Current Medications    DICLOFENAC SODIUM (VOLTAREN) 1 % GEL    Apply 4 g topically 4 (four) times daily.   Discontinued Medications    METHOCARBAMOL (ROBAXIN) 750 MG TAB        TRAMADOL (ULTRAM) 50 MG TABLET    Take 1 tablet (50 mg total) by mouth every 6 (six) hours.     Review of patient's allergies indicates:  No Known Allergies  Review of Systems   Constitutional: Negative for decreased appetite.   HENT:  Negative for tinnitus.    Eyes:  Negative for double vision.   Cardiovascular:  Negative for chest pain.   Respiratory:  Negative for wheezing.    Hematologic/Lymphatic: Negative for bleeding problem.   Skin:  Negative for dry skin.   Musculoskeletal:  Positive for arthritis, joint pain and muscle cramps. Negative for back pain, gout, neck pain and stiffness.   Gastrointestinal:  Negative for abdominal pain.   Genitourinary:  Negative for bladder incontinence.   Neurological:  Negative for numbness, paresthesias and sensory change.   Psychiatric/Behavioral:  Negative for altered mental status.      Objective:   Body mass index is 28.78 kg/m².  There were no vitals filed for this visit.       General    Constitutional: He is oriented to person, place, and time. He appears well-developed.   HENT:   Head: Atraumatic.   Eyes: EOM are normal.   Pulmonary/Chest: Effort normal.   Neurological: He is alert and oriented to  person, place, and time.   Psychiatric: Judgment normal.         Ambulating with a limp but no assistive devices.  His wife with him   Pelvis is level  Bilateral hips passive motion without pain in the groin.  There is excellent motion.  Hip flexors and abductors and adductors were 5/5   Bilateral knees with varus deformity   Right knee with severe medial joint tenderness.  Range of motion 5-125.  Crepitus to compression on the medial side and patella.  There is mild to moderate swelling.  Collaterals and cruciates seems to be stable.  No defect in the patella or quadriceps tendon.  The quads and hamstrings were 5/5   Left knee with severe medial joint tenderness also range of motion 5-125.  Crepitus to compression on the medial and patella.  There is mild swelling.  Collaterals and cruciates stable.  No defect in the patella or quadriceps tendon.  His strength is 5/5 in quadriceps and hamstrings   Calves are soft nontender with slight varicosities  Ankles without swelling  Ankle motion intact   Skin is warm to touch no obvious lesions  PT 1+    Relevant imaging results reviewed and interpreted by me, discussed with the patient and / or family today     X-ray 10/12/2022 bilateral knees with left knee all bone on bone medially with marginal osteophytes complete loss of joint space compared to the right knee which is moderately severe loss of medial joint space also some marginal osteophytes consistent with arthritis and varus deformity  Assessment:     Encounter Diagnoses   Name Primary?    Arthritis of knee, right Yes    Acquired varus deformity knee, right     Arthritis of knee, left     Acquired varus deformity knee, left     Primary osteoarthritis of both knees         Plan:   Arthritis of knee, right    Acquired varus deformity knee, right    Arthritis of knee, left    Acquired varus deformity knee, left    Primary osteoarthritis of both knees  -     Ambulatory referral/consult to Orthopedics    Other orders  -      nabumetone (RELAFEN) 750 MG tablet; Take 1 tablet (750 mg total) by mouth 2 (two) times daily as needed for Pain. Take with food  Dispense: 60 tablet; Refill: 3  -     gabapentin (NEURONTIN) 300 MG capsule; Take 1 capsule (300 mg total) by mouth 2 (two) times daily.  Dispense: 60 capsule; Refill: 11  -     cyclobenzaprine (FLEXERIL) 10 MG tablet; Take 1 tablet (10 mg total) by mouth 2 (two) times daily as needed for Muscle spasms (Muscle spasm).  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    We will give you a braces   You should apply for disability at this time since you have not worked for a year   I do not think he can achieve any gainful employment at this time      Total knee replacement min is considered outpatient surgery by the government that means you will have you surgery go home the same day.  Usually we arrange for home health and  home physical therapy for couple weeks.  Usually takes 3 months to heal.  It is done under general anesthesia and will take 2 hours to perform.  I will give you a brochure to read        Disclaimer: This note was prepared using a voice recognition system and is likely to have sound alike errors within the text.

## 2023-01-23 NOTE — PATIENT INSTRUCTIONS
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    We will give you a braces   You should apply for disability at this time since you have not worked for a year   I do not think he can achieve any gainful employment at this time      Total knee replacement min is considered outpatient surgery by the government that means you will have you surgery go home the same day.  Usually we arrange for home health and home physical therapy for couple weeks.  Usually takes 3 months to heal.  It is done under general anesthesia and will take 2 hours to perform.  I will give you a brochure to read

## 2023-01-23 NOTE — TELEPHONE ENCOUNTER
LVM for the patient to call Podiatry    ----- Message from Edda Jacobo sent at 1/23/2023  9:43 AM CST -----  Contact: Alana  Type:  Sooner Apoointment Request    Caller is requesting a sooner appointment.  Caller declined first available appointment listed below.  Caller will not accept being placed on the waitlist and is requesting a message be sent to doctor.  Name of Caller: ALANA   When is the first available appointment?n/a   Symptoms:annual   Would the patient rather a call back or a response via [facility]? Call back   Best Call Back Number:[phone]  Additional Information: n/a]        Thanks KB

## 2023-01-23 NOTE — TELEPHONE ENCOUNTER
----- Message from Geovani Villa sent at 1/23/2023 11:07 AM CST -----  Contact: wife  ..Type:  Patient Returning Call    Who Called: Alana   Who Left Message for Patient: Faith   Does the patient know what this is regarding?: no   Would the patient rather a call back or a response via [facility]?  Call back   Best Call Back Number:.[phone] ([place])   Additional Information: pt wife states they missed a phone call

## 2023-01-23 NOTE — TELEPHONE ENCOUNTER
----- Message from Geovani Villa sent at 1/23/2023 11:05 AM CST -----  Contact: wife  Pt wife Alana is asking for an return call in reference to needing to reschedule apt  had on today at 2:20 until tomorrow ,please call back at .[phone] Thx CJ

## 2023-01-23 NOTE — TELEPHONE ENCOUNTER
Spoke to patients wife, they are broke down in their vehicle and may not make his appointment this afternoon. I explained I can not reschedule him until sometime in March. They stated they will try to be here. I let them know anything after 15 minutes let we may ask them to reschedule. Patients wife verbalized understanding.

## 2023-01-24 ENCOUNTER — OFFICE VISIT (OUTPATIENT)
Dept: PODIATRY | Facility: CLINIC | Age: 58
End: 2023-01-24
Payer: MEDICAID

## 2023-01-24 VITALS — BODY MASS INDEX: 28.89 KG/M2 | WEIGHT: 218 LBS | HEIGHT: 73 IN

## 2023-01-24 DIAGNOSIS — M77.42 METATARSALGIA OF LEFT FOOT: ICD-10-CM

## 2023-01-24 DIAGNOSIS — B35.1 PAIN DUE TO ONYCHOMYCOSIS OF TOENAILS OF BOTH FEET: ICD-10-CM

## 2023-01-24 DIAGNOSIS — M79.675 PAIN DUE TO ONYCHOMYCOSIS OF TOENAILS OF BOTH FEET: ICD-10-CM

## 2023-01-24 DIAGNOSIS — B35.1 DERMATOPHYTOSIS OF NAIL: ICD-10-CM

## 2023-01-24 DIAGNOSIS — M79.672 LEFT FOOT PAIN: Primary | ICD-10-CM

## 2023-01-24 DIAGNOSIS — L84 CORN OR CALLUS: ICD-10-CM

## 2023-01-24 DIAGNOSIS — M79.674 PAIN DUE TO ONYCHOMYCOSIS OF TOENAILS OF BOTH FEET: ICD-10-CM

## 2023-01-24 PROCEDURE — 3008F PR BODY MASS INDEX (BMI) DOCUMENTED: ICD-10-PCS | Mod: CPTII,,, | Performed by: PODIATRIST

## 2023-01-24 PROCEDURE — 99213 OFFICE O/P EST LOW 20 MIN: CPT | Mod: 25,S$PBB,, | Performed by: PODIATRIST

## 2023-01-24 PROCEDURE — 11721 DEBRIDE NAIL 6 OR MORE: CPT | Mod: PBBFAC,PO | Performed by: PODIATRIST

## 2023-01-24 PROCEDURE — 3008F BODY MASS INDEX DOCD: CPT | Mod: CPTII,,, | Performed by: PODIATRIST

## 2023-01-24 PROCEDURE — 1159F PR MEDICATION LIST DOCUMENTED IN MEDICAL RECORD: ICD-10-PCS | Mod: CPTII,,, | Performed by: PODIATRIST

## 2023-01-24 PROCEDURE — 99213 PR OFFICE/OUTPT VISIT, EST, LEVL III, 20-29 MIN: ICD-10-PCS | Mod: 25,S$PBB,, | Performed by: PODIATRIST

## 2023-01-24 PROCEDURE — 99999 PR PBB SHADOW E&M-EST. PATIENT-LVL II: CPT | Mod: PBBFAC,,, | Performed by: PODIATRIST

## 2023-01-24 PROCEDURE — 99212 OFFICE O/P EST SF 10 MIN: CPT | Mod: PBBFAC,PO | Performed by: PODIATRIST

## 2023-01-24 PROCEDURE — 1159F MED LIST DOCD IN RCRD: CPT | Mod: CPTII,,, | Performed by: PODIATRIST

## 2023-01-24 PROCEDURE — 99999 PR PBB SHADOW E&M-EST. PATIENT-LVL II: ICD-10-PCS | Mod: PBBFAC,,, | Performed by: PODIATRIST

## 2023-01-24 PROCEDURE — 11721 DEBRIDE NAIL 6 OR MORE: CPT | Mod: S$PBB,,, | Performed by: PODIATRIST

## 2023-01-24 PROCEDURE — 11721 PR DEBRIDEMENT OF NAILS, 6 OR MORE: ICD-10-PCS | Mod: S$PBB,,, | Performed by: PODIATRIST

## 2023-01-24 NOTE — PROGRESS NOTES
PODIATRIC MEDICINE AND SURGERY        CHIEF COMPLAINT   Chief Complaint   Patient presents with    Callouses     C/o painful callous under left foot, rates pain 10/10, x several months, non diabetic wears tennis shoes and socks, Last seen PCP Dr. Chávez 07/19/2021         HPI:    Madhav Ruiz is a 57 y.o. male presenting to podiatry clinic with complaint of painful lesion on bottom of left foot as well as painful toenails. Pt has tried OTC treatment without relief. Symptoms are aggravated with pressure and/or ambulation or certain shoewear.  Patient states relief is obtained with routine debridements. No further pedal complaints.      PMH  Past Medical History:   Diagnosis Date    History of alcohol abuse        PROBLEM LIST  Patient Active Problem List    Diagnosis Date Noted    Chronic hepatitis C without hepatic coma 08/27/2021    Alcohol abuse 04/01/2021    Chronic postoperative pain 11/25/2019    History of craniotomy 11/25/2019    Pain in joint, lower leg 02/18/2017       MEDS  Current Outpatient Medications on File Prior to Visit   Medication Sig Dispense Refill    cyclobenzaprine (FLEXERIL) 10 MG tablet Take 1 tablet (10 mg total) by mouth 2 (two) times daily as needed for Muscle spasms (Muscle spasm). 60 tablet 2    diclofenac sodium (VOLTAREN) 1 % Gel Apply 4 g topically 4 (four) times daily. 4 each 3    gabapentin (NEURONTIN) 300 MG capsule Take 1 capsule (300 mg total) by mouth 2 (two) times daily. 60 capsule 11    nabumetone (RELAFEN) 750 MG tablet Take 1 tablet (750 mg total) by mouth 2 (two) times daily as needed for Pain. Take with food 60 tablet 3     No current facility-administered medications on file prior to visit.       PSH     Past Surgical History:   Procedure Laterality Date    head procedure          ALL  Review of patient's allergies indicates:  No Known Allergies    SOC     Social History     Tobacco Use    Smoking status: Every Day     Packs/day: 0.50     Types: Cigarettes    Smokeless  "tobacco: Never   Substance Use Topics    Alcohol use: Yes     Alcohol/week: 20.0 standard drinks     Types: 20 Shots of liquor per week    Drug use: Never         Family HX  History reviewed. No pertinent family history.         REVIEW OF SYSTEMS  General: Denies any fever or chills  Chest: Denies shortness of breath, wheezing, coughing, or sputum production  Heart: Denies chest pain, cold extremities, orthopenia, or reduced exercise tolerance  As noted above and per history of current illness above, otherwise negative in the remainder of the 14 systems.      PHYSICAL EXAM:      Vitals:    01/24/23 1153   Weight: 98.9 kg (218 lb)   Height: 6' 1" (1.854 m)   PainSc: 10-Worst pain ever       General: This patient is well-developed, well-nourished and appears stated age, well-oriented to person, place and time, and cooperative and pleasant on today's visit    LOWER EXTREMITY PHYSICAL EXAM  VASCULAR  Dorsalis pedis and posterior tibial pulses palpable 2/4 bilaterally.   Capillary refill time immediate to the toes.   Feet are warm to the touch. Skin temperature warm to warm from proximally to distally   There are no varicosities, telangiectasias noted to bilateral foot and ankle regions.   There are no ecchymoses noted to bilateral foot and ankle regions.   There is no gross lower extremity edema.    DERMATOLOGIC  There are thickened discolored, elongated mycotic nails suggestive of onychomycosis    Skin moist with healthy texture and turgor.  There are no open ulcerations, lacerations, or fissures to bilateral foot and ankle regions. There are no signs of infection as there is no erythema, no proximal-extending lymphangiitis, no fluctuance, or crepitus noted on palpation to bilateral foot and ankle regions.   There is no interdigital maceration.   There are hyperkeratotic lesions noted to sub 1st metatarsal head left foot.    NEUROLOGIC  Epicritic sensation is intact as the patient is able to sense light touch to " bilateral foot and ankle regions.   Achilles and patellar deep tendon reflexes intact  Babinski reflex absent    ORTHOPEDIC/BIOMECHANICAL  Pain with palpation of above noted lesion   Muscle strength AT/EHL/EDL/PT: 5/5; Achilles/Gastroc/Soleus: 5/5; PB/PL: 5/5 Muscle tone is normal.  Ankle joint ROM INTACT DF/PF, non-crepitus      ASSESSMENT   Left foot pain    Metatarsalgia of left foot    Corn or callus    Dermatophytosis of nail    Pain due to onychomycosis of toenails of both feet          PLAN    1. Patient was educated about clinical and imaging findings, and verbalizes understanding of above.      -With patient's permission via verbal consent, the involved area was cleansed with an alcohol swab. Trimming of hyperkeratotic lesions deep to epidermal layer x 1 was performed with a #15 blade without incident. Patient relates relief following the procedure. Patient will continue to monitor the areas daily, inspect feet, wear protective shoe gear when ambulatory, moisturizer to maintain skin integrity.    -Rx for \Urea 40 /Amlactin for calluses, metatarsal pad dispensed and applied for offloading callus. Shoe recommendations provided for accomodative foot wear.    -With patient's permission, the elongated onychomycotic toenails, as outlined in the physical examination, were sharply debrided with a double action nail nipper to their soft tissue attachment. If indicated, the nails were then smoothed down in thickness with a mechanical rotary mila and/or joselyn board to facilitate in further debridement removing all offending nail and subungual debris.        Report Electronically Signed By:     Shiloh Kerr DPM   Podiatry  [facility]  1/24/2023

## 2023-04-24 ENCOUNTER — TELEPHONE (OUTPATIENT)
Dept: PODIATRY | Facility: CLINIC | Age: 58
End: 2023-04-24
Payer: MEDICAID

## 2023-05-11 ENCOUNTER — OFFICE VISIT (OUTPATIENT)
Dept: PODIATRY | Facility: CLINIC | Age: 58
End: 2023-05-11
Payer: MEDICAID

## 2023-05-11 VITALS — BODY MASS INDEX: 28.89 KG/M2 | WEIGHT: 218 LBS | HEIGHT: 73 IN

## 2023-05-11 DIAGNOSIS — B35.1 DERMATOPHYTOSIS OF NAIL: ICD-10-CM

## 2023-05-11 DIAGNOSIS — B35.1 PAIN DUE TO ONYCHOMYCOSIS OF TOENAILS OF BOTH FEET: ICD-10-CM

## 2023-05-11 DIAGNOSIS — M79.672 LEFT FOOT PAIN: Primary | ICD-10-CM

## 2023-05-11 DIAGNOSIS — M79.674 PAIN DUE TO ONYCHOMYCOSIS OF TOENAILS OF BOTH FEET: ICD-10-CM

## 2023-05-11 DIAGNOSIS — M79.675 PAIN DUE TO ONYCHOMYCOSIS OF TOENAILS OF BOTH FEET: ICD-10-CM

## 2023-05-11 DIAGNOSIS — L84 CORN OR CALLUS: ICD-10-CM

## 2023-05-11 PROCEDURE — 11721 DEBRIDE NAIL 6 OR MORE: CPT | Mod: 59,S$PBB,, | Performed by: PODIATRIST

## 2023-05-11 PROCEDURE — 11055 PARING/CUTG B9 HYPRKER LES 1: CPT | Mod: PBBFAC | Performed by: PODIATRIST

## 2023-05-11 PROCEDURE — 11721 DEBRIDE NAIL 6 OR MORE: CPT | Mod: PBBFAC | Performed by: PODIATRIST

## 2023-05-11 PROCEDURE — 99999 PR PBB SHADOW E&M-EST. PATIENT-LVL II: ICD-10-PCS | Mod: PBBFAC,,, | Performed by: PODIATRIST

## 2023-05-11 PROCEDURE — 3008F PR BODY MASS INDEX (BMI) DOCUMENTED: ICD-10-PCS | Mod: CPTII,,, | Performed by: PODIATRIST

## 2023-05-11 PROCEDURE — 99999 PR PBB SHADOW E&M-EST. PATIENT-LVL II: CPT | Mod: PBBFAC,,, | Performed by: PODIATRIST

## 2023-05-11 PROCEDURE — 1159F PR MEDICATION LIST DOCUMENTED IN MEDICAL RECORD: ICD-10-PCS | Mod: CPTII,,, | Performed by: PODIATRIST

## 2023-05-11 PROCEDURE — 1159F MED LIST DOCD IN RCRD: CPT | Mod: CPTII,,, | Performed by: PODIATRIST

## 2023-05-11 PROCEDURE — 99213 OFFICE O/P EST LOW 20 MIN: CPT | Mod: 25,S$PBB,, | Performed by: PODIATRIST

## 2023-05-11 PROCEDURE — 11055 PARING/CUTG B9 HYPRKER LES 1: CPT | Mod: S$PBB,,, | Performed by: PODIATRIST

## 2023-05-11 PROCEDURE — 11721 PR DEBRIDEMENT OF NAILS, 6 OR MORE: ICD-10-PCS | Mod: 59,S$PBB,, | Performed by: PODIATRIST

## 2023-05-11 PROCEDURE — 99213 PR OFFICE/OUTPT VISIT, EST, LEVL III, 20-29 MIN: ICD-10-PCS | Mod: 25,S$PBB,, | Performed by: PODIATRIST

## 2023-05-11 PROCEDURE — 99212 OFFICE O/P EST SF 10 MIN: CPT | Mod: 25,PBBFAC | Performed by: PODIATRIST

## 2023-05-11 PROCEDURE — 3008F BODY MASS INDEX DOCD: CPT | Mod: CPTII,,, | Performed by: PODIATRIST

## 2023-05-11 PROCEDURE — 11055 PR TRIM HYPERKERATOTIC SKIN LESION, ONE: ICD-10-PCS | Mod: S$PBB,,, | Performed by: PODIATRIST

## 2023-05-11 NOTE — PROGRESS NOTES
PODIATRIC MEDICINE AND SURGERY        CHIEF COMPLAINT   Chief Complaint   Patient presents with    Callouses     C/o painful callus, sub 1st met head, x several years, rates pain 7/10, non-diabetic, wears tennis and socks         HPI:    Madhav Ruiz is a 58 y.o. male presenting to podiatry clinic with complaint of painful lesion on bottom of left foot as well as painful toenails. Pt has tried OTC treatment without relief. Symptoms are aggravated with pressure and/or ambulation or certain shoewear.  Patient states relief is obtained with routine debridements. No further pedal complaints.      PMH  Past Medical History:   Diagnosis Date    History of alcohol abuse        PROBLEM LIST  Patient Active Problem List    Diagnosis Date Noted    Chronic hepatitis C without hepatic coma 08/27/2021    Alcohol abuse 04/01/2021    Chronic postoperative pain 11/25/2019    History of craniotomy 11/25/2019    Pain in joint, lower leg 02/18/2017       MEDS  Current Outpatient Medications on File Prior to Visit   Medication Sig Dispense Refill    diclofenac sodium (VOLTAREN) 1 % Gel Apply 4 g topically 4 (four) times daily. 4 each 3    gabapentin (NEURONTIN) 300 MG capsule Take 1 capsule (300 mg total) by mouth 2 (two) times daily. 60 capsule 11    nabumetone (RELAFEN) 750 MG tablet Take 1 tablet (750 mg total) by mouth 2 (two) times daily as needed for Pain. Take with food 60 tablet 3     No current facility-administered medications on file prior to visit.       PSH     Past Surgical History:   Procedure Laterality Date    head procedure          ALL  Review of patient's allergies indicates:  No Known Allergies    SOC     Social History     Tobacco Use    Smoking status: Every Day     Packs/day: 0.50     Types: Cigarettes    Smokeless tobacco: Never   Substance Use Topics    Alcohol use: Yes     Alcohol/week: 20.0 standard drinks     Types: 20 Shots of liquor per week    Drug use: Never         Family HX  History reviewed. No  "pertinent family history.         REVIEW OF SYSTEMS  General: Denies any fever or chills  Chest: Denies shortness of breath, wheezing, coughing, or sputum production  Heart: Denies chest pain, cold extremities, orthopenia, or reduced exercise tolerance  As noted above and per history of current illness above, otherwise negative in the remainder of the 14 systems.      PHYSICAL EXAM:      Vitals:    05/11/23 1506   Weight: 98.9 kg (218 lb)   Height: 6' 1" (1.854 m)   PainSc:   7       General: This patient is well-developed, well-nourished and appears stated age, well-oriented to person, place and time, and cooperative and pleasant on today's visit    LOWER EXTREMITY PHYSICAL EXAM  VASCULAR  Dorsalis pedis and posterior tibial pulses palpable 2/4 bilaterally.   Capillary refill time immediate to the toes.   Feet are warm to the touch. Skin temperature warm to warm from proximally to distally   There are no varicosities, telangiectasias noted to bilateral foot and ankle regions.   There are no ecchymoses noted to bilateral foot and ankle regions.   There is no gross lower extremity edema.    DERMATOLOGIC  There are thickened discolored, elongated mycotic nails suggestive of onychomycosis    Skin moist with healthy texture and turgor.  There are no open ulcerations, lacerations, or fissures to bilateral foot and ankle regions. There are no signs of infection as there is no erythema, no proximal-extending lymphangiitis, no fluctuance, or crepitus noted on palpation to bilateral foot and ankle regions.   There is no interdigital maceration.   There are hyperkeratotic lesions noted to sub 1st metatarsal head left foot.    NEUROLOGIC  Epicritic sensation is intact as the patient is able to sense light touch to bilateral foot and ankle regions.   Achilles and patellar deep tendon reflexes intact  Babinski reflex absent    ORTHOPEDIC/BIOMECHANICAL  Pain with palpation of above noted lesion   Muscle strength AT/EHL/EDL/PT: " 5/5; Achilles/Gastroc/Soleus: 5/5; PB/PL: 5/5 Muscle tone is normal.  Ankle joint ROM INTACT DF/PF, non-crepitus      ASSESSMENT   Left foot pain    Corn or callus    Dermatophytosis of nail    Pain due to onychomycosis of toenails of both feet            PLAN    1. Patient was educated about clinical and imaging findings, and verbalizes understanding of above.      -With patient's permission via verbal consent, the involved area was cleansed with an alcohol swab. Trimming of hyperkeratotic lesions deep to epidermal layer x 1 was performed with a #15 blade without incident. Patient relates relief following the procedure. Patient will continue to monitor the areas daily, inspect feet, wear protective shoe gear when ambulatory, moisturizer to maintain skin integrity.    -Rx for \Urea 40 /Amlactin for calluses, metatarsal pad dispensed and applied for offloading callus. Shoe recommendations provided for accomodative foot wear.    -With patient's permission, the elongated onychomycotic toenails, as outlined in the physical examination, were sharply debrided with a double action nail nipper to their soft tissue attachment. If indicated, the nails were then smoothed down in thickness with a mechanical rotary mila and/or joselyn board to facilitate in further debridement removing all offending nail and subungual debris.        Report Electronically Signed By:     Shiloh Kerr DPM   Podiatry  [facility]  6/5/2023

## 2023-06-19 ENCOUNTER — PATIENT MESSAGE (OUTPATIENT)
Dept: INTERNAL MEDICINE | Facility: CLINIC | Age: 58
End: 2023-06-19
Payer: MEDICAID

## 2023-06-19 ENCOUNTER — TELEPHONE (OUTPATIENT)
Dept: INTERNAL MEDICINE | Facility: CLINIC | Age: 58
End: 2023-06-19
Payer: MEDICAID

## 2023-06-19 NOTE — TELEPHONE ENCOUNTER
----- Message from Ratna Mullins sent at 6/19/2023  8:43 AM CDT -----  Name of Who is Calling:  Alana(Wife)         What is the request in detail:  Alana is returning a call for Faith. Not sure what message is in regards to. Wife states that patient was recently seen in [place] on 06/16 and believes that this is what the call was in regards to.         Can the clinic reply by [facility]:  no         What [facility] Back [facility]: [phone]

## 2023-06-20 ENCOUNTER — OFFICE VISIT (OUTPATIENT)
Dept: INTERNAL MEDICINE | Facility: CLINIC | Age: 58
End: 2023-06-20
Payer: MEDICAID

## 2023-06-20 ENCOUNTER — TELEPHONE (OUTPATIENT)
Dept: HEPATOLOGY | Facility: CLINIC | Age: 58
End: 2023-06-20
Payer: MEDICAID

## 2023-06-20 VITALS
DIASTOLIC BLOOD PRESSURE: 70 MMHG | HEART RATE: 76 BPM | WEIGHT: 216.94 LBS | TEMPERATURE: 98 F | SYSTOLIC BLOOD PRESSURE: 138 MMHG | HEIGHT: 73 IN | BODY MASS INDEX: 28.75 KG/M2 | RESPIRATION RATE: 16 BRPM | OXYGEN SATURATION: 97 %

## 2023-06-20 DIAGNOSIS — K74.60 HEPATIC CIRRHOSIS, UNSPECIFIED HEPATIC CIRRHOSIS TYPE, UNSPECIFIED WHETHER ASCITES PRESENT: ICD-10-CM

## 2023-06-20 DIAGNOSIS — F10.10 ALCOHOL ABUSE: ICD-10-CM

## 2023-06-20 DIAGNOSIS — M79.89 LEG SWELLING: ICD-10-CM

## 2023-06-20 DIAGNOSIS — E83.51 HYPOCALCEMIA: ICD-10-CM

## 2023-06-20 DIAGNOSIS — B18.2 CHRONIC HEPATITIS C WITHOUT HEPATIC COMA: Primary | ICD-10-CM

## 2023-06-20 DIAGNOSIS — E87.6 HYPOKALEMIA: ICD-10-CM

## 2023-06-20 DIAGNOSIS — R74.8 ELEVATED LIVER ENZYMES: ICD-10-CM

## 2023-06-20 PROCEDURE — 3008F BODY MASS INDEX DOCD: CPT | Mod: CPTII,,, | Performed by: NURSE PRACTITIONER

## 2023-06-20 PROCEDURE — 3075F PR MOST RECENT SYSTOLIC BLOOD PRESS GE 130-139MM HG: ICD-10-PCS | Mod: CPTII,,, | Performed by: NURSE PRACTITIONER

## 2023-06-20 PROCEDURE — 3075F SYST BP GE 130 - 139MM HG: CPT | Mod: CPTII,,, | Performed by: NURSE PRACTITIONER

## 2023-06-20 PROCEDURE — 3078F PR MOST RECENT DIASTOLIC BLOOD PRESSURE < 80 MM HG: ICD-10-PCS | Mod: CPTII,,, | Performed by: NURSE PRACTITIONER

## 2023-06-20 PROCEDURE — 1159F MED LIST DOCD IN RCRD: CPT | Mod: CPTII,,, | Performed by: NURSE PRACTITIONER

## 2023-06-20 PROCEDURE — 3078F DIAST BP <80 MM HG: CPT | Mod: CPTII,,, | Performed by: NURSE PRACTITIONER

## 2023-06-20 PROCEDURE — 99999 PR PBB SHADOW E&M-EST. PATIENT-LVL IV: ICD-10-PCS | Mod: PBBFAC,,, | Performed by: NURSE PRACTITIONER

## 2023-06-20 PROCEDURE — 1160F PR REVIEW ALL MEDS BY PRESCRIBER/CLIN PHARMACIST DOCUMENTED: ICD-10-PCS | Mod: CPTII,,, | Performed by: NURSE PRACTITIONER

## 2023-06-20 PROCEDURE — 3008F PR BODY MASS INDEX (BMI) DOCUMENTED: ICD-10-PCS | Mod: CPTII,,, | Performed by: NURSE PRACTITIONER

## 2023-06-20 PROCEDURE — 1160F RVW MEDS BY RX/DR IN RCRD: CPT | Mod: CPTII,,, | Performed by: NURSE PRACTITIONER

## 2023-06-20 PROCEDURE — 1159F PR MEDICATION LIST DOCUMENTED IN MEDICAL RECORD: ICD-10-PCS | Mod: CPTII,,, | Performed by: NURSE PRACTITIONER

## 2023-06-20 PROCEDURE — 99999 PR PBB SHADOW E&M-EST. PATIENT-LVL IV: CPT | Mod: PBBFAC,,, | Performed by: NURSE PRACTITIONER

## 2023-06-20 PROCEDURE — 99214 OFFICE O/P EST MOD 30 MIN: CPT | Mod: S$PBB,,, | Performed by: NURSE PRACTITIONER

## 2023-06-20 PROCEDURE — 99214 OFFICE O/P EST MOD 30 MIN: CPT | Mod: PBBFAC | Performed by: NURSE PRACTITIONER

## 2023-06-20 PROCEDURE — 99214 PR OFFICE/OUTPT VISIT, EST, LEVL IV, 30-39 MIN: ICD-10-PCS | Mod: S$PBB,,, | Performed by: NURSE PRACTITIONER

## 2023-06-20 RX ORDER — HYDROCHLOROTHIAZIDE 25 MG/1
25 TABLET ORAL DAILY
Qty: 30 TABLET | Refills: 11 | Status: SHIPPED | OUTPATIENT
Start: 2023-06-20 | End: 2023-07-12

## 2023-06-20 NOTE — PROGRESS NOTES
aMdhav Ruiz  06/20/2023  [id number]    Cailin Chávez MD  Patient Care Team:  Cailin Chávez MD as PCP - General (Internal Medicine)  Primary Doctor No          Visit Type:an urgent visit for a new problem    Chief Complaint:  Chief Complaint   Patient presents with    Follow-up       History of Present Illness:    58-year-old male presents today for hospital follow up for ER visit to [facility].  Pt states he has been experiencing bilateral lower leg edema for one week and a half. No history of similar episodes. Reports daily alcohol use and smoking. Denies any fevers, chills, chest pain, shortness of breath, nausea, vomiting, diarrhea, abdominal pain, numbness, or weakness.    Reports his last drink was 3 days ago    History:  Past Medical History:   Diagnosis Date    History of alcohol abuse      Past Surgical History:   Procedure Laterality Date    head procedure       History reviewed. No pertinent family history.  Social History     Socioeconomic History    Marital status:    Tobacco Use    Smoking status: Every Day     Packs/day: 0.50     Types: Cigarettes    Smokeless tobacco: Never   Substance and Sexual Activity    Alcohol use: Yes     Alcohol/week: 20.0 standard drinks     Types: 20 Shots of liquor per week    Drug use: Never    Sexual activity: Yes     Partners: Female     Patient Active Problem List   Diagnosis    Alcohol abuse    Chronic postoperative pain    History of craniotomy    Pain in joint, lower leg    Chronic hepatitis C without hepatic coma     Review of patient's allergies indicates:  No Known Allergies    The following were reviewed at this visit: active problem list, medication list, allergies, family history, social history, and health maintenance.    Medications:  Current Outpatient Medications on File Prior to Visit   Medication Sig Dispense Refill    diclofenac sodium (VOLTAREN) 1 % Gel Apply 4 g topically 4 (four) times daily. (Patient not taking: Reported on  6/20/2023) 4 each 3    gabapentin (NEURONTIN) 300 MG capsule Take 1 capsule (300 mg total) by mouth 2 (two) times daily. (Patient not taking: Reported on 6/20/2023) 60 capsule 11    [DISCONTINUED] nabumetone (RELAFEN) 750 MG tablet Take 1 tablet (750 mg total) by mouth 2 (two) times daily as needed for Pain. Take with food (Patient not taking: Reported on 6/20/2023) 60 tablet 3     No current facility-administered medications on file prior to visit.       Medications have been reviewed and reconciled with patient at this visit.  Barriers to medications reviewed with patient.    Adverse reactions to current medications reviewed with patient..    Over the counter medications reviewed and reconciled with patient.    Exam:  Wt Readings from Last 3 Encounters:   06/20/23 98.4 kg (216 lb 14.9 oz)   05/11/23 98.9 kg (218 lb)   01/24/23 98.9 kg (218 lb)     Temp Readings from Last 3 Encounters:   06/20/23 97.6 °F (36.4 °C) (Tympanic)   04/24/22 98.7 °F (37.1 °C)   12/06/21 98.6 °F (37 °C) (Tympanic)     BP Readings from Last 3 Encounters:   06/20/23 138/70   04/24/22 110/69   12/06/21 124/75     Pulse Readings from Last 3 Encounters:   06/20/23 76   04/24/22 73   12/06/21 78     Body mass index is 28.62 kg/m².      Review of Systems   Constitutional:  Negative for fever.   Respiratory:  Negative for cough, shortness of breath and wheezing.    Cardiovascular:  Positive for leg swelling. Negative for chest pain and palpitations.   Gastrointestinal:  Negative for nausea.   Neurological:  Negative for speech change, weakness and headaches.   All other systems reviewed and are negative.  Physical Exam  Vitals and nursing note reviewed.   Constitutional:       Appearance: Normal appearance. He is normal weight.   HENT:      Head: Normocephalic and atraumatic.      Right Ear: Tympanic membrane, ear canal and external ear normal.      Left Ear: Tympanic membrane, ear canal and external ear normal.      Nose: Nose normal.       Mouth/Throat:      Mouth: Mucous membranes are moist.      Pharynx: Oropharynx is clear.   Eyes:      Extraocular Movements: Extraocular movements intact.      Conjunctiva/sclera: Conjunctivae normal.      Pupils: Pupils are equal, round, and reactive to light.   Cardiovascular:      Rate and Rhythm: Normal rate and regular rhythm.      Pulses: Normal pulses.           Dorsalis pedis pulses are 2+ on the right side and 2+ on the left side.      Heart sounds: Normal heart sounds.   Pulmonary:      Effort: Pulmonary effort is normal.      Breath sounds: Normal breath sounds.   Abdominal:      General: Bowel sounds are normal.      Palpations: Abdomen is soft.   Musculoskeletal:         General: Normal range of motion.      Cervical back: Normal range of motion and neck supple.      Right lower le+ Pitting Edema present.      Left lower le+ Pitting Edema present.   Skin:     General: Skin is warm and dry.      Capillary Refill: Capillary refill takes less than 2 seconds.   Neurological:      General: No focal deficit present.      Mental Status: He is alert and oriented to person, place, and time.   Psychiatric:         Mood and Affect: Mood normal.         Behavior: Behavior normal.         Thought Content: Thought content normal.         Judgment: Judgment normal.       Laboratory Reviewed ({Yes)  Lab Results   Component Value Date    WBC 3.83 (L) 2021    HGB 12.3 (L) 2021    HCT 36.6 (L) 2021     2021    CHOL 155 2021    TRIG 42 2021    HDL 63 2021    ALT 79 (H) 2021     (H) 2021     2021    K 4.4 2021     2021    CREATININE 0.8 2021    BUN 5 (L) 2021    CO2 28 2021    PSA 2.9 2021       Madhav was seen today for follow-up.    Diagnoses and all orders for this visit:    Chronic hepatitis C without hepatic coma  -     Ambulatory referral/consult to Hepatology; Future    Leg swelling  -      Ambulatory referral/consult to Hepatology; Future    Hypokalemia    Hypocalcemia    Elevated liver enzymes        Care Plan/Goals: Reviewed    Goals    None     Madhav was seen today for follow-up.    Diagnoses and all orders for this visit:    Chronic hepatitis C without hepatic coma  -     Ambulatory referral/consult to Hepatology; Future    Leg swelling  -     Ambulatory referral/consult to Hepatology; Future    Hypokalemia    Hypocalcemia    Elevated liver enzymes       STOP antiinflammatory  STOP drinking ETOH  Start wearing compression   Follow up: Follow up with Dr. Knutson.    After visit summary was printed and given to patient upon discharge today.  Patient goals and care plan are included in After Visit Summary.

## 2023-06-20 NOTE — TELEPHONE ENCOUNTER
----- Message from Keena Phillips sent at 6/20/2023  1:48 PM CDT -----  Pt have a referral and would like to schedule an appt. Call the pt's wife Alana back at [phone]. Thx. EL

## 2023-06-20 NOTE — TELEPHONE ENCOUNTER
Returned her call and informed her that we are not accepting any medicaid patients and provided the patient with the number to [facility].

## 2023-06-23 ENCOUNTER — TELEPHONE (OUTPATIENT)
Dept: HEPATOLOGY | Facility: CLINIC | Age: 58
End: 2023-06-23
Payer: MEDICAID

## 2023-06-23 NOTE — TELEPHONE ENCOUNTER
----- Message from Susy Negron sent at 6/23/2023 10:42 AM CDT -----  Regarding: Schedule Appt  Pt calling to schedule an appointment.      [phone] ([place])

## 2023-07-07 ENCOUNTER — TELEPHONE (OUTPATIENT)
Dept: HEPATOLOGY | Facility: CLINIC | Age: 58
End: 2023-07-07
Payer: MEDICAID

## 2023-07-07 NOTE — TELEPHONE ENCOUNTER
Patient scheduled to see PA Scheuermann on 7/7/23 for hep c.  His wife called and moved appt to 7/10/23 because of her work schedule.  During conversation she mentioned that patient is a heavy alcohol drinker and that his abdomen is very swollen and he is finding it difficult to move around so that is why they did not want to change visit to a virtual one.  She feels that a provider needs to physically see him and make recommendations.  She states that he is not experiencing any additional signs of liver decompensation.  It was stressed that if swelling became unbearable, he experienced SOB at rest or starting having chest pains that that he should report to ER.

## 2023-07-10 ENCOUNTER — OFFICE VISIT (OUTPATIENT)
Dept: HEPATOLOGY | Facility: CLINIC | Age: 58
End: 2023-07-10
Payer: MEDICAID

## 2023-07-10 VITALS — HEIGHT: 73 IN | WEIGHT: 197.31 LBS | BODY MASS INDEX: 26.15 KG/M2

## 2023-07-10 DIAGNOSIS — K74.60 HEPATIC CIRRHOSIS, UNSPECIFIED HEPATIC CIRRHOSIS TYPE, UNSPECIFIED WHETHER ASCITES PRESENT: ICD-10-CM

## 2023-07-10 DIAGNOSIS — M79.89 LEG SWELLING: ICD-10-CM

## 2023-07-10 DIAGNOSIS — B18.2 CHRONIC HEPATITIS C WITHOUT HEPATIC COMA: ICD-10-CM

## 2023-07-10 PROCEDURE — 1160F PR REVIEW ALL MEDS BY PRESCRIBER/CLIN PHARMACIST DOCUMENTED: ICD-10-PCS | Mod: CPTII,,, | Performed by: PHYSICIAN ASSISTANT

## 2023-07-10 PROCEDURE — 3008F BODY MASS INDEX DOCD: CPT | Mod: CPTII,,, | Performed by: PHYSICIAN ASSISTANT

## 2023-07-10 PROCEDURE — 99213 OFFICE O/P EST LOW 20 MIN: CPT | Mod: PBBFAC | Performed by: PHYSICIAN ASSISTANT

## 2023-07-10 PROCEDURE — 1160F RVW MEDS BY RX/DR IN RCRD: CPT | Mod: CPTII,,, | Performed by: PHYSICIAN ASSISTANT

## 2023-07-10 PROCEDURE — 99215 PR OFFICE/OUTPT VISIT, EST, LEVL V, 40-54 MIN: ICD-10-PCS | Mod: S$PBB,,, | Performed by: PHYSICIAN ASSISTANT

## 2023-07-10 PROCEDURE — 1159F MED LIST DOCD IN RCRD: CPT | Mod: CPTII,,, | Performed by: PHYSICIAN ASSISTANT

## 2023-07-10 PROCEDURE — 99999 PR PBB SHADOW E&M-EST. PATIENT-LVL III: ICD-10-PCS | Mod: PBBFAC,,, | Performed by: PHYSICIAN ASSISTANT

## 2023-07-10 PROCEDURE — 3008F PR BODY MASS INDEX (BMI) DOCUMENTED: ICD-10-PCS | Mod: CPTII,,, | Performed by: PHYSICIAN ASSISTANT

## 2023-07-10 PROCEDURE — 99999 PR PBB SHADOW E&M-EST. PATIENT-LVL III: CPT | Mod: PBBFAC,,, | Performed by: PHYSICIAN ASSISTANT

## 2023-07-10 PROCEDURE — 1159F PR MEDICATION LIST DOCUMENTED IN MEDICAL RECORD: ICD-10-PCS | Mod: CPTII,,, | Performed by: PHYSICIAN ASSISTANT

## 2023-07-10 PROCEDURE — 99215 OFFICE O/P EST HI 40 MIN: CPT | Mod: S$PBB,,, | Performed by: PHYSICIAN ASSISTANT

## 2023-07-10 RX ORDER — LACTULOSE 10 G/15ML
20 SOLUTION ORAL DAILY
Qty: 900 ML | Refills: 3 | Status: SHIPPED | OUTPATIENT
Start: 2023-07-10 | End: 2023-11-20 | Stop reason: SDUPTHER

## 2023-07-10 NOTE — PATIENT INSTRUCTIONS
Labs today  Daily weight - notify me if up by 3 lbs in a day or 5 lbs in a week  Low sodium diet, less than 2,000mg per day (see below)  Schedule u/s  Schedule f/u visit w/ me soon  Do not ever drink alcohol again. Your life depends on it. You have one liver only.           DIET RECOMMENDATIONS  AVOID ALL alcohol (includes beer, wine and liquor)  NO RAW SEAFOOD due to the risk of infection  LOW SODIUM / SALT diet, less than 2000 mg per day   (Avoid canned foods, sports drinks, pickles / pickle juice. Avoid adding salt to food; use salt-free seasonings when cooking. Read food labels.)  HIGH PROTEIN DIET to prevent muscle mass loss   (ie: meat, chicken, fish, eggs, dairy, whey protein powder, protein shakes).   Avoid missing & skipping missing meals.   (Smaller more frequent meals throughout the day is better than fewer large meals.)
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
## 2023-07-11 ENCOUNTER — HOSPITAL ENCOUNTER (OUTPATIENT)
Dept: RADIOLOGY | Facility: HOSPITAL | Age: 58
Discharge: HOME | End: 2023-07-11
Attending: PHYSICIAN ASSISTANT
Payer: MEDICAID

## 2023-07-11 DIAGNOSIS — B18.2 CHRONIC HEPATITIS C WITHOUT HEPATIC COMA: ICD-10-CM

## 2023-07-11 DIAGNOSIS — K74.60 HEPATIC CIRRHOSIS, UNSPECIFIED HEPATIC CIRRHOSIS TYPE, UNSPECIFIED WHETHER ASCITES PRESENT: ICD-10-CM

## 2023-07-11 PROCEDURE — 76700 US ABDOMEN COMP WITH DOPPLER (XPD): ICD-10-PCS | Mod: 26,59,, | Performed by: RADIOLOGY

## 2023-07-11 PROCEDURE — 76700 US EXAM ABDOM COMPLETE: CPT | Mod: TC

## 2023-07-11 PROCEDURE — 93975 US ABDOMEN COMP WITH DOPPLER (XPD): ICD-10-PCS | Mod: 26,,, | Performed by: RADIOLOGY

## 2023-07-11 PROCEDURE — 93975 VASCULAR STUDY: CPT | Mod: 26,,, | Performed by: RADIOLOGY

## 2023-07-11 PROCEDURE — 93975 VASCULAR STUDY: CPT | Mod: 59,TC

## 2023-07-11 PROCEDURE — 76700 US EXAM ABDOM COMPLETE: CPT | Mod: 26,59,, | Performed by: RADIOLOGY

## 2023-07-12 ENCOUNTER — TELEPHONE (OUTPATIENT)
Dept: HEPATOLOGY | Facility: CLINIC | Age: 58
End: 2023-07-12
Payer: MEDICAID

## 2023-07-12 DIAGNOSIS — K74.60 HEPATIC CIRRHOSIS, UNSPECIFIED HEPATIC CIRRHOSIS TYPE, UNSPECIFIED WHETHER ASCITES PRESENT: Primary | ICD-10-CM

## 2023-07-12 RX ORDER — SPIRONOLACTONE 50 MG/1
100 TABLET, FILM COATED ORAL DAILY
Qty: 60 TABLET | Refills: 1 | Status: SHIPPED | OUTPATIENT
Start: 2023-07-12 | End: 2023-09-01 | Stop reason: SDUPTHER

## 2023-07-12 RX ORDER — FUROSEMIDE 20 MG/1
40 TABLET ORAL DAILY
Qty: 60 TABLET | Refills: 1 | Status: SHIPPED | OUTPATIENT
Start: 2023-07-12 | End: 2023-09-01 | Stop reason: SDUPTHER

## 2023-07-12 NOTE — TELEPHONE ENCOUNTER
7/11/23 labs:  MELD-Na: 16 at 7/11/2023  1:55 PM  MELD: 15 at 7/11/2023  1:55 PM  Calculated from:  Serum Creatinine: 0.8 mg/dL (Using min of 1 mg/dL) at 7/11/2023  1:55 PM  Serum Sodium: 136 mmol/L at 7/11/2023  1:55 PM  Total Bilirubin: 3.6 mg/dL at 7/11/2023  1:55 PM  INR(ratio): 1.4 at 7/11/2023  1:55 PM      BUN 4, Cr 0.8, Na 136, K 4.3    Pls call pt / wife  Labs show we can safely start / increase fluid pills  Since HCTZ was prescribed by urgent care for fluid have him stop it   Begin lasix 40mg (two 20s) + spironolactone 100mg (two 50s) - Rx sent to [facility] on [address]  Schedule BMP Mon next week  Continue low sodium and daily weight monitoring as discussed      Tell him U/S did not show any cancers in liver.  Keep f/u appt next month

## 2023-07-12 NOTE — TELEPHONE ENCOUNTER
I spoke with patient's wife and msg from PA Scheuermann relayed.  It was stressed that HCTZ should be stopped and that lasix and spironolactone should be started as prescribed.  Wife verbalized understanding of med change.  Lab draw scheduled 7/17/23; appt reminder notice mailed along with provider note.

## 2023-07-17 ENCOUNTER — TELEPHONE (OUTPATIENT)
Dept: HEPATOLOGY | Facility: CLINIC | Age: 58
End: 2023-07-17
Payer: MEDICAID

## 2023-07-17 NOTE — TELEPHONE ENCOUNTER
Pls call back:    He was supposed to have BMP done today so I could see if diuretics could be increased. Has he gone? Will he be able to go today - or at least tomorrow?    Assuming he can get labs today or tomorrow he can increase fluid pills to:  Lasix 80mg (four 20s) + spironolactone 200mg (four 50s)     Other option if fluid pills aren't helping at all and he's gaining more and more fluid he may have to go back to ER    Please remind about low Na diet - don't add salt, don't cook w/ salt, use only salt free seasonings, no canned foods, check ALL food labels

## 2023-07-17 NOTE — TELEPHONE ENCOUNTER
I spoke with wife (Alana) and patient.  Patient is complaining of severe swelling in legs and feet.  He states that he fills like his legs are going to pop.  He is taking Lasix 40 mg and Spironolactone 100 mg daily.  Per wife he's not eating much.  She states that he has yellowing of his eyes.  Patient denied having confusion, SOB, chest pain, coughing up blood, seeing blood in stool or passing black tarry stool. Wife states that he abdomen is swollen.  Patient does have have a scale so he could not provide a weight (I stressed that he should be one ASAP).     Him/He

## 2023-07-17 NOTE — TELEPHONE ENCOUNTER
I spoke with Alana and msg from PA Scheuermann relayed.  She states that she is out of town and patient has to rely on transportation to get him to appointments and they did not show up today.  Draw had to be moved to 7/19/23.  She states that he will increase diuretics per new order and complete draw then.  It was stressed that he would have to report to ER if fluid pills aren't helping at all and he's gaining more fluid.

## 2023-07-17 NOTE — TELEPHONE ENCOUNTER
----- Message from Megan Hodges sent at 7/17/2023  1:29 PM CDT -----  Contact: [phone] Alana  Pt wife requesting call back RE: she states that pt is swollen, would like to speak right away.      Confirmed contact below:  Contact Name:Madhav Ruiz  Phone Number: [phone]

## 2023-07-18 ENCOUNTER — TELEPHONE (OUTPATIENT)
Dept: HEPATOLOGY | Facility: CLINIC | Age: 58
End: 2023-07-18
Payer: MEDICAID

## 2023-07-18 NOTE — TELEPHONE ENCOUNTER
----- Message from Harika Renteria sent at 7/18/2023 11:50 AM CDT -----  Regarding: Consult/Advisory  Contact: [phone]  CONSULT/ADVISORY    Name of Caller: Alana Ruiz (Spouse)    Contact Preference: [phone]    Nature of Call: Pts spouse is calling in ref to pt not eating and is constantly swelling.  She is requesting a call back because she want's pt to be admitted.  Pt is weak and steady swelling.

## 2023-07-19 ENCOUNTER — LAB VISIT (OUTPATIENT)
Dept: LAB | Facility: HOSPITAL | Age: 58
End: 2023-07-19
Attending: PHYSICIAN ASSISTANT
Payer: MEDICAID

## 2023-07-19 DIAGNOSIS — K74.60 HEPATIC CIRRHOSIS, UNSPECIFIED HEPATIC CIRRHOSIS TYPE, UNSPECIFIED WHETHER ASCITES PRESENT: ICD-10-CM

## 2023-07-19 PROCEDURE — 36415 COLL VENOUS BLD VENIPUNCTURE: CPT | Performed by: PHYSICIAN ASSISTANT

## 2023-07-19 PROCEDURE — 80048 BASIC METABOLIC PNL TOTAL CA: CPT | Performed by: PHYSICIAN ASSISTANT

## 2023-07-20 ENCOUNTER — TELEPHONE (OUTPATIENT)
Dept: HEPATOLOGY | Facility: CLINIC | Age: 58
End: 2023-07-20
Payer: MEDICAID

## 2023-07-20 LAB
ANION GAP SERPL CALC-SCNC: 9 MMOL/L (ref 8–16)
BUN SERPL-MCNC: 4 MG/DL (ref 6–20)
CALCIUM SERPL-MCNC: 8.7 MG/DL (ref 8.7–10.5)
CHLORIDE SERPL-SCNC: 105 MMOL/L (ref 95–110)
CO2 SERPL-SCNC: 23 MMOL/L (ref 23–29)
CREAT SERPL-MCNC: 0.9 MG/DL (ref 0.5–1.4)
EST. GFR  (NO RACE VARIABLE): >60 ML/MIN/1.73 M^2
GLUCOSE SERPL-MCNC: 88 MG/DL (ref 70–110)
POTASSIUM SERPL-SCNC: 3.9 MMOL/L (ref 3.5–5.1)
SODIUM SERPL-SCNC: 137 MMOL/L (ref 136–145)

## 2023-07-20 NOTE — TELEPHONE ENCOUNTER
I spoke with patient's wife.  She states that he did not report to ER as ordered.  He did have lab draw done on yesterday.  Fluid is improving.  She states that patient has been taking the following:    lasix 40 mg and spironolactone 100 mg in the am     lasix 20 mg and spironolactone 50 mg in the pm

## 2023-07-20 NOTE — TELEPHONE ENCOUNTER
I spoke with staff at [facility].  Sample drawn after 5 pm on yesterday so sample sent for processing this am to [facility].  Result pending.

## 2023-07-20 NOTE — TELEPHONE ENCOUNTER
----- Message from Juan Torres sent at 7/20/2023 11:55 AM CDT -----  Regarding: call back  Pt's wife call to speak with [place] in regards to pt labs requesting call back    Call

## 2023-07-21 ENCOUNTER — TELEPHONE (OUTPATIENT)
Dept: HEPATOLOGY | Facility: CLINIC | Age: 58
End: 2023-07-21
Payer: MEDICAID

## 2023-07-21 NOTE — TELEPHONE ENCOUNTER
7/19/23 LABS  Lab Results   Component Value Date     07/19/2023    K 3.9 07/19/2023    BUN 4 (L) 07/19/2023    CREATININE 0.9 07/19/2023       Per msg yest pt on:  Lasix 40 + fernando 100 in AM  Lasix 20 + fernando 50 in PM    Pls call pt / wife:  Labs are stable  If fluid continuing to improve, he can continue current doses of pills  It is okay to take everything in AM - doesn't have to be  to BID    If fluid not improving, let me know b/c we can increase doses.    Keep f/u appt w/ me

## 2023-08-01 ENCOUNTER — TELEPHONE (OUTPATIENT)
Dept: HEPATOLOGY | Facility: CLINIC | Age: 58
End: 2023-08-01
Payer: MEDICAID

## 2023-08-01 NOTE — TELEPHONE ENCOUNTER
Patient scheduled for a visit with PA Scheuermann on 8/3/23.  I spoke with his wife.  She asked that appt be moved to 8/14/23; done.

## 2023-09-01 ENCOUNTER — LAB VISIT (OUTPATIENT)
Dept: LAB | Facility: HOSPITAL | Age: 58
End: 2023-09-01
Payer: MEDICAID

## 2023-09-01 ENCOUNTER — OFFICE VISIT (OUTPATIENT)
Dept: HEPATOLOGY | Facility: CLINIC | Age: 58
End: 2023-09-01
Payer: MEDICAID

## 2023-09-01 VITALS — BODY MASS INDEX: 24.11 KG/M2 | HEIGHT: 73 IN | WEIGHT: 181.88 LBS

## 2023-09-01 DIAGNOSIS — B18.2 CHRONIC HEPATITIS C WITHOUT HEPATIC COMA: ICD-10-CM

## 2023-09-01 DIAGNOSIS — K74.60 HEPATIC CIRRHOSIS, UNSPECIFIED HEPATIC CIRRHOSIS TYPE, UNSPECIFIED WHETHER ASCITES PRESENT: Primary | ICD-10-CM

## 2023-09-01 DIAGNOSIS — K76.6 PORTAL VENOUS HYPERTENSION: ICD-10-CM

## 2023-09-01 DIAGNOSIS — K74.60 HEPATIC CIRRHOSIS, UNSPECIFIED HEPATIC CIRRHOSIS TYPE, UNSPECIFIED WHETHER ASCITES PRESENT: ICD-10-CM

## 2023-09-01 LAB
ALBUMIN SERPL BCP-MCNC: 2.7 G/DL (ref 3.5–5.2)
ALP SERPL-CCNC: 123 U/L (ref 55–135)
ALT SERPL W/O P-5'-P-CCNC: 28 U/L (ref 10–44)
ANION GAP SERPL CALC-SCNC: 8 MMOL/L (ref 8–16)
AST SERPL-CCNC: 53 U/L (ref 10–40)
BILIRUB SERPL-MCNC: 3 MG/DL (ref 0.1–1)
BUN SERPL-MCNC: 4 MG/DL (ref 6–20)
CALCIUM SERPL-MCNC: 8.5 MG/DL (ref 8.7–10.5)
CHLORIDE SERPL-SCNC: 106 MMOL/L (ref 95–110)
CO2 SERPL-SCNC: 23 MMOL/L (ref 23–29)
CREAT SERPL-MCNC: 0.9 MG/DL (ref 0.5–1.4)
EST. GFR  (NO RACE VARIABLE): >60 ML/MIN/1.73 M^2
GLUCOSE SERPL-MCNC: 69 MG/DL (ref 70–110)
INR PPP: 1.4 (ref 0.8–1.2)
POTASSIUM SERPL-SCNC: 3.8 MMOL/L (ref 3.5–5.1)
PROT SERPL-MCNC: 8 G/DL (ref 6–8.4)
PROTHROMBIN TIME: 14.6 SEC (ref 9–12.5)
SODIUM SERPL-SCNC: 137 MMOL/L (ref 136–145)

## 2023-09-01 PROCEDURE — 3008F PR BODY MASS INDEX (BMI) DOCUMENTED: ICD-10-PCS | Mod: CPTII,,, | Performed by: PHYSICIAN ASSISTANT

## 2023-09-01 PROCEDURE — 99215 PR OFFICE/OUTPT VISIT, EST, LEVL V, 40-54 MIN: ICD-10-PCS | Mod: S$PBB,,, | Performed by: PHYSICIAN ASSISTANT

## 2023-09-01 PROCEDURE — 1160F PR REVIEW ALL MEDS BY PRESCRIBER/CLIN PHARMACIST DOCUMENTED: ICD-10-PCS | Mod: CPTII,,, | Performed by: PHYSICIAN ASSISTANT

## 2023-09-01 PROCEDURE — 99999 PR PBB SHADOW E&M-EST. PATIENT-LVL III: ICD-10-PCS | Mod: PBBFAC,,, | Performed by: PHYSICIAN ASSISTANT

## 2023-09-01 PROCEDURE — 80321 ALCOHOLS BIOMARKERS 1OR 2: CPT | Performed by: PHYSICIAN ASSISTANT

## 2023-09-01 PROCEDURE — 99213 OFFICE O/P EST LOW 20 MIN: CPT | Mod: PBBFAC | Performed by: PHYSICIAN ASSISTANT

## 2023-09-01 PROCEDURE — 1159F PR MEDICATION LIST DOCUMENTED IN MEDICAL RECORD: ICD-10-PCS | Mod: CPTII,,, | Performed by: PHYSICIAN ASSISTANT

## 2023-09-01 PROCEDURE — 99215 OFFICE O/P EST HI 40 MIN: CPT | Mod: S$PBB,,, | Performed by: PHYSICIAN ASSISTANT

## 2023-09-01 PROCEDURE — 80053 COMPREHEN METABOLIC PANEL: CPT | Performed by: PHYSICIAN ASSISTANT

## 2023-09-01 PROCEDURE — 85610 PROTHROMBIN TIME: CPT | Performed by: PHYSICIAN ASSISTANT

## 2023-09-01 PROCEDURE — 99999 PR PBB SHADOW E&M-EST. PATIENT-LVL III: CPT | Mod: PBBFAC,,, | Performed by: PHYSICIAN ASSISTANT

## 2023-09-01 PROCEDURE — 1159F MED LIST DOCD IN RCRD: CPT | Mod: CPTII,,, | Performed by: PHYSICIAN ASSISTANT

## 2023-09-01 PROCEDURE — 1160F RVW MEDS BY RX/DR IN RCRD: CPT | Mod: CPTII,,, | Performed by: PHYSICIAN ASSISTANT

## 2023-09-01 PROCEDURE — 3008F BODY MASS INDEX DOCD: CPT | Mod: CPTII,,, | Performed by: PHYSICIAN ASSISTANT

## 2023-09-01 RX ORDER — FUROSEMIDE 20 MG/1
40 TABLET ORAL DAILY
Qty: 60 TABLET | Refills: 2 | Status: SHIPPED | OUTPATIENT
Start: 2023-09-01 | End: 2023-11-20 | Stop reason: SDUPTHER

## 2023-09-01 RX ORDER — SPIRONOLACTONE 50 MG/1
100 TABLET, FILM COATED ORAL DAILY
Qty: 60 TABLET | Refills: 2 | Status: SHIPPED | OUTPATIENT
Start: 2023-09-01 | End: 2023-11-20 | Stop reason: SDUPTHER

## 2023-09-01 NOTE — Clinical Note
Hello! I entered a referral for a screening EGD (varices screening). Phone appt is in Oct. He has cirrhosis w/ vomiting blood a month ago so I wanted to see if it could be done a little sooner. Thanks!
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
## 2023-09-01 NOTE — PATIENT INSTRUCTIONS
"Labs today: checking MELD score to see how liver is working, checking kidney numbers since you're on fluid pills  Continue current fluid pills unless I say otherwise after seeing your labs  Phone appointment to schedule EGD (upper scope) to check for swollen vessels around swallowing tube: Oct 20 at 11:40am. (I'll see if I can get this sooner)  After labs back I'll let you know if I'm sending script for HCV meds to [facility]   Avoid all alcohol - even what's in "nonalcoholic beer"  As long as liver remains stable:  - HCV treatment  - routine liver monitoring w/ cancer screening - 1/2024  "

## 2023-09-04 LAB
CLINICAL BIOCHEMIST REVIEW: NORMAL
PLPETH BLD-MCNC: 333 NG/ML
POPETH BLD-MCNC: 214 NG/ML

## 2023-09-05 ENCOUNTER — TELEPHONE (OUTPATIENT)
Dept: HEPATOLOGY | Facility: CLINIC | Age: 58
End: 2023-09-05
Payer: MEDICAID

## 2023-09-05 ENCOUNTER — TELEPHONE (OUTPATIENT)
Dept: ENDOSCOPY | Facility: HOSPITAL | Age: 58
End: 2023-09-05
Payer: MEDICAID

## 2023-09-05 VITALS — HEIGHT: 73 IN | BODY MASS INDEX: 23.99 KG/M2 | WEIGHT: 181 LBS

## 2023-09-05 DIAGNOSIS — K74.60 HEPATIC CIRRHOSIS, UNSPECIFIED HEPATIC CIRRHOSIS TYPE, UNSPECIFIED WHETHER ASCITES PRESENT: ICD-10-CM

## 2023-09-05 DIAGNOSIS — K76.6 PORTAL VENOUS HYPERTENSION: ICD-10-CM

## 2023-09-05 DIAGNOSIS — K74.60 HEPATIC CIRRHOSIS, UNSPECIFIED HEPATIC CIRRHOSIS TYPE, UNSPECIFIED WHETHER ASCITES PRESENT: Primary | ICD-10-CM

## 2023-09-05 DIAGNOSIS — B18.2 CHRONIC HEPATITIS C WITHOUT HEPATIC COMA: Primary | ICD-10-CM

## 2023-09-05 RX ORDER — VELPATASVIR AND SOFOSBUVIR 100; 400 MG/1; MG/1
1 TABLET, FILM COATED ORAL DAILY
Qty: 28 TABLET | Refills: 5 | Status: ACTIVE | OUTPATIENT
Start: 2023-09-05

## 2023-09-05 NOTE — TELEPHONE ENCOUNTER
I spoke with Mrs. Ruiz and msg from PA Scheuermann relayed.  She states that info will be passed on to her .  Msg from provider mailed to patient.  Testing scheduled 1/12/24 and virtual visit with provider scheduled 1/19/24; appt reminder notice mailed.

## 2023-09-05 NOTE — TELEPHONE ENCOUNTER
Spoke to Pt to schedule procedure(s) Upper Endoscopy (EGD)       Physician to perform procedure(s) Dr. JOSE D Rosales  Date of Procedure (s) 10/4/23  Arrival Time 9:00am arrival for labs then 9:30  AM arrival for procedure  Time of Procedure(s) 10:30 AM   Location of Procedure(s) [address]  Type of Rx Prep sent to patient: N/A  Instructions provided to patient via [facility]    Patient was informed on the following information and verbalized understanding. Screening questionnaire reviewed with patient and complete. If procedure requires anesthesia, a responsible adult needs to be present to accompany the patient home, patient cannot drive after receiving anesthesia. Appointment details are tentative, especially check-in time. Patient will receive a prep-op call 4 days prior to confirm check-in time for procedure. If applicable the patient should contact their pharmacy to verify Rx for procedure prep is ready for pick-up. Patient was advised to call the scheduling department at [phone] if pharmacy states no Rx is available. Patient was advised to call the endoscopy scheduling department if any questions or concerns arise.      [facility] Scheduling Department

## 2023-09-05 NOTE — TELEPHONE ENCOUNTER
Labs 9/1/23:  MELD 3.0: 16 at 9/1/2023  2:41 PM  MELD-Na: 14 at 9/1/2023  2:41 PM  Calculated from:  Serum Creatinine: 0.9 mg/dL (Using min of 1 mg/dL) at 9/1/2023  2:41 PM  Serum Sodium: 137 mmol/L at 9/1/2023  2:41 PM  Total Bilirubin: 3.0 mg/dL at 9/1/2023  2:41 PM  Serum Albumin: 2.7 g/dL at 9/1/2023  2:41 PM  INR(ratio): 1.4 at 9/1/2023  2:41 PM  Age at listing (hypothetical): 58 years  Sex: Male at 9/1/2023  2:41 PM    Peth 214 (up from  23)    Pls call pt:  Tell him MELD (liver sickness score) is 16. This indicates his liver is still very sick, though it has improved slightly (was previusly 17 and 24)  I see EGD is scheduled in Oct. If he has any throwing up of blood prior to this date he needs to go to ER.  Lab to measure alcohol over the last 4 weeks is actually higher than it was when I checked it in July (was 23, now 214). I do not expect this from nonalcoholic beer such as O'Doul's so I want to once again stress how important it is that he discontinue all alcohol.  As I mentioned during his office visit, if he continues drinking any alcohol his liver will fail and he will die.   I will send script for 24 wks epclusa to [facility]. He should notify us when he starts the med    Schedule: CBC, CMP, INR, AFP, U/S, VISIT - 1/2024

## 2023-09-11 ENCOUNTER — TELEPHONE (OUTPATIENT)
Dept: HEPATOLOGY | Facility: CLINIC | Age: 58
End: 2023-09-11
Payer: MEDICAID

## 2023-09-11 DIAGNOSIS — K74.60 HEPATIC CIRRHOSIS, UNSPECIFIED HEPATIC CIRRHOSIS TYPE, UNSPECIFIED WHETHER ASCITES PRESENT: ICD-10-CM

## 2023-09-11 DIAGNOSIS — B18.2 CHRONIC HEPATITIS C WITHOUT HEPATIC COMA: Primary | ICD-10-CM

## 2023-09-11 DIAGNOSIS — K74.60 HEPATIC CIRRHOSIS, UNSPECIFIED HEPATIC CIRRHOSIS TYPE, UNSPECIFIED WHETHER ASCITES PRESENT: Primary | ICD-10-CM

## 2023-09-11 NOTE — TELEPHONE ENCOUNTER
Pt beginning 24 weeks Epclusa on 9/13/23  Anticipated treatment end date: 2/27/24  F 4 decompensated  Ebony 1A  Prior HCV treatment: No      Pls update episode and schedule:  - LFT, HCV RNA, CBC at week 4 -   - add HCV RNA, Peth to 1/12/24 labs  - LFT, HCV RNA - SVR12 - 5/27/24      thanks

## 2023-09-11 NOTE — TELEPHONE ENCOUNTER
Lab draw scheduled 10/10/23 and 5/27/24.  Labs added to 1/12/24 draw as ordered.  Msg from provider mailed to patient along with appt notice reminders.

## 2023-09-27 ENCOUNTER — PATIENT MESSAGE (OUTPATIENT)
Dept: ENDOSCOPY | Facility: HOSPITAL | Age: 58
End: 2023-09-27
Payer: MEDICAID

## 2023-10-04 ENCOUNTER — ANESTHESIA (OUTPATIENT)
Dept: ENDOSCOPY | Facility: HOSPITAL | Age: 58
End: 2023-10-04
Payer: MEDICAID

## 2023-10-04 ENCOUNTER — ANESTHESIA EVENT (OUTPATIENT)
Dept: ENDOSCOPY | Facility: HOSPITAL | Age: 58
End: 2023-10-04
Payer: MEDICAID

## 2023-10-04 ENCOUNTER — HOSPITAL ENCOUNTER (OUTPATIENT)
Facility: HOSPITAL | Age: 58
Discharge: HOME | End: 2023-10-04
Attending: INTERNAL MEDICINE | Admitting: INTERNAL MEDICINE
Payer: MEDICAID

## 2023-10-04 VITALS
RESPIRATION RATE: 18 BRPM | WEIGHT: 181 LBS | DIASTOLIC BLOOD PRESSURE: 86 MMHG | BODY MASS INDEX: 23.99 KG/M2 | TEMPERATURE: 98 F | HEART RATE: 94 BPM | OXYGEN SATURATION: 100 % | HEIGHT: 73 IN | SYSTOLIC BLOOD PRESSURE: 133 MMHG

## 2023-10-04 DIAGNOSIS — K74.60 HEPATIC CIRRHOSIS, UNSPECIFIED HEPATIC CIRRHOSIS TYPE, UNSPECIFIED WHETHER ASCITES PRESENT: Primary | ICD-10-CM

## 2023-10-04 DIAGNOSIS — K74.60 CIRRHOSIS: ICD-10-CM

## 2023-10-04 PROCEDURE — D9220A PRA ANESTHESIA: Mod: CRNA,,, | Performed by: NURSE ANESTHETIST, CERTIFIED REGISTERED

## 2023-10-04 PROCEDURE — 27201012 HC FORCEPS, HOT/COLD, DISP: Performed by: INTERNAL MEDICINE

## 2023-10-04 PROCEDURE — 43239 EGD BIOPSY SINGLE/MULTIPLE: CPT | Performed by: INTERNAL MEDICINE

## 2023-10-04 PROCEDURE — 88342 IMHCHEM/IMCYTCHM 1ST ANTB: CPT | Performed by: STUDENT IN AN ORGANIZED HEALTH CARE EDUCATION/TRAINING PROGRAM

## 2023-10-04 PROCEDURE — D9220A PRA ANESTHESIA: Mod: ANES,,, | Performed by: ANESTHESIOLOGY

## 2023-10-04 PROCEDURE — 63600175 PHARM REV CODE 636 W HCPCS: Performed by: NURSE ANESTHETIST, CERTIFIED REGISTERED

## 2023-10-04 PROCEDURE — 43239 EGD BIOPSY SINGLE/MULTIPLE: CPT | Mod: ,,, | Performed by: INTERNAL MEDICINE

## 2023-10-04 PROCEDURE — 88305 TISSUE EXAM BY PATHOLOGIST: CPT | Performed by: STUDENT IN AN ORGANIZED HEALTH CARE EDUCATION/TRAINING PROGRAM

## 2023-10-04 PROCEDURE — 37000009 HC ANESTHESIA EA ADD 15 MINS: Performed by: INTERNAL MEDICINE

## 2023-10-04 PROCEDURE — 88342 IMHCHEM/IMCYTCHM 1ST ANTB: CPT | Mod: 26,,, | Performed by: STUDENT IN AN ORGANIZED HEALTH CARE EDUCATION/TRAINING PROGRAM

## 2023-10-04 PROCEDURE — D9220A PRA ANESTHESIA: ICD-10-PCS | Mod: CRNA,,, | Performed by: NURSE ANESTHETIST, CERTIFIED REGISTERED

## 2023-10-04 PROCEDURE — 88305 TISSUE EXAM BY PATHOLOGIST: CPT | Mod: 26,,, | Performed by: STUDENT IN AN ORGANIZED HEALTH CARE EDUCATION/TRAINING PROGRAM

## 2023-10-04 PROCEDURE — 37000008 HC ANESTHESIA 1ST 15 MINUTES: Performed by: INTERNAL MEDICINE

## 2023-10-04 PROCEDURE — 25000003 PHARM REV CODE 250: Performed by: NURSE ANESTHETIST, CERTIFIED REGISTERED

## 2023-10-04 PROCEDURE — D9220A PRA ANESTHESIA: ICD-10-PCS | Mod: ANES,,, | Performed by: ANESTHESIOLOGY

## 2023-10-04 PROCEDURE — 25000003 PHARM REV CODE 250: Performed by: INTERNAL MEDICINE

## 2023-10-04 PROCEDURE — 88342 CHG IMMUNOCYTOCHEMISTRY: ICD-10-PCS | Mod: 26,,, | Performed by: STUDENT IN AN ORGANIZED HEALTH CARE EDUCATION/TRAINING PROGRAM

## 2023-10-04 PROCEDURE — 43239 PR EGD, FLEX, W/BIOPSY, SGL/MULTI: ICD-10-PCS | Mod: ,,, | Performed by: INTERNAL MEDICINE

## 2023-10-04 PROCEDURE — 88305 TISSUE EXAM BY PATHOLOGIST: ICD-10-PCS | Mod: 26,,, | Performed by: STUDENT IN AN ORGANIZED HEALTH CARE EDUCATION/TRAINING PROGRAM

## 2023-10-04 RX ORDER — PANTOPRAZOLE SODIUM 40 MG/1
40 TABLET, DELAYED RELEASE ORAL DAILY
Qty: 60 TABLET | Refills: 0 | Status: SHIPPED | OUTPATIENT
Start: 2023-10-04 | End: 2023-11-24

## 2023-10-04 RX ORDER — LORAZEPAM 2 MG/ML
0.25 INJECTION INTRAMUSCULAR ONCE AS NEEDED
Status: DISCONTINUED | OUTPATIENT
Start: 2023-10-04 | End: 2023-10-04 | Stop reason: HOSPADM

## 2023-10-04 RX ORDER — HYDROMORPHONE HYDROCHLORIDE 1 MG/ML
0.2 INJECTION, SOLUTION INTRAMUSCULAR; INTRAVENOUS; SUBCUTANEOUS EVERY 5 MIN PRN
Status: DISCONTINUED | OUTPATIENT
Start: 2023-10-04 | End: 2023-10-04 | Stop reason: HOSPADM

## 2023-10-04 RX ORDER — MEPERIDINE HYDROCHLORIDE 50 MG/ML
12.5 INJECTION INTRAMUSCULAR; INTRAVENOUS; SUBCUTANEOUS ONCE AS NEEDED
Status: DISCONTINUED | OUTPATIENT
Start: 2023-10-04 | End: 2023-10-04 | Stop reason: HOSPADM

## 2023-10-04 RX ORDER — PROPOFOL 10 MG/ML
VIAL (ML) INTRAVENOUS
Status: DISCONTINUED | OUTPATIENT
Start: 2023-10-04 | End: 2023-10-04

## 2023-10-04 RX ORDER — ONDANSETRON 2 MG/ML
4 INJECTION INTRAMUSCULAR; INTRAVENOUS DAILY PRN
Status: DISCONTINUED | OUTPATIENT
Start: 2023-10-04 | End: 2023-10-04 | Stop reason: HOSPADM

## 2023-10-04 RX ORDER — SODIUM CHLORIDE 9 MG/ML
INJECTION, SOLUTION INTRAVENOUS CONTINUOUS
Status: DISCONTINUED | OUTPATIENT
Start: 2023-10-04 | End: 2023-10-04 | Stop reason: HOSPADM

## 2023-10-04 RX ORDER — LIDOCAINE HYDROCHLORIDE 20 MG/ML
INJECTION INTRAVENOUS
Status: DISCONTINUED | OUTPATIENT
Start: 2023-10-04 | End: 2023-10-04

## 2023-10-04 RX ORDER — PROPOFOL 10 MG/ML
VIAL (ML) INTRAVENOUS CONTINUOUS PRN
Status: DISCONTINUED | OUTPATIENT
Start: 2023-10-04 | End: 2023-10-04

## 2023-10-04 RX ADMIN — Medication 110 MG: at 10:10

## 2023-10-04 RX ADMIN — SODIUM CHLORIDE: 9 INJECTION, SOLUTION INTRAVENOUS at 09:10

## 2023-10-04 RX ADMIN — Medication 40 MG: at 10:10

## 2023-10-04 RX ADMIN — LIDOCAINE HYDROCHLORIDE 100 MG: 20 INJECTION INTRAVENOUS at 10:10

## 2023-10-04 RX ADMIN — PROPOFOL 200 MCG/KG/MIN: 10 INJECTION, EMULSION INTRAVENOUS at 10:10

## 2023-10-04 RX ADMIN — SODIUM CHLORIDE: 0.9 INJECTION, SOLUTION INTRAVENOUS at 10:10

## 2023-10-04 NOTE — PROGRESS NOTES
"Dr Montaño on phone with pt's partner "felice"  talking about surgery findings, post op care, and follow up care  "

## 2023-10-04 NOTE — PROVATION PATIENT INSTRUCTIONS
Discharge Summary/Instructions after an Endoscopic Procedure  Patient Name: Madhav Ruiz  Patient MRN: [id number]  Patient YOB: 1965 Wednesday, October 4, 2023  Polo Montaño MD  Dear patient,  As a result of recent federal legislation (The Federal Cures Act), you may   receive lab or pathology results from your procedure in your [facility]   account before your physician is able to contact you. Your physician or   their representative will relay the results to you with their   recommendations at their soonest availability.  Thank you,  RESTRICTIONS:  During your procedure today, you received medications for sedation.  These   medications may affect your judgment, balance and coordination.  Therefore,   for 24 hours, you have the following restrictions:   - DO NOT drive a car, operate machinery, make legal/financial decisions,   sign important papers or drink alcohol.    ACTIVITY:  Today: no heavy lifting, straining or running due to procedural   sedation/anesthesia.  The following day: return to full activity including work.  DIET:  Eat and drink normally unless instructed otherwise.     TREATMENT FOR COMMON SIDE EFFECTS:  - Mild abdominal pain, nausea, belching, bloating or excessive gas:  rest,   eat lightly and use a heating pad.  - Sore Throat: treat with throat lozenges and/or gargle with warm salt   water.  - Because air was used during the procedure, expelling large amounts of air   from your rectum or belching is normal.  - If a bowel prep was taken, you may not have a bowel movement for 1-3 days.    This is normal.  SYMPTOMS TO WATCH FOR AND REPORT TO YOUR PHYSICIAN:  1. Abdominal pain or bloating, other than gas cramps.  2. Chest pain.  3. Back pain.  4. Signs of infection such as: chills or fever occurring within 24 hours   after the procedure.  5. Rectal bleeding, which would show as bright red, maroon, or black stools.   (A tablespoon of blood from the rectum is not serious, especially if    hemorrhoids are present.)  6. Vomiting.  7. Weakness or dizziness.  GO DIRECTLY TO THE NEAREST EMERGENCY ROOM IF YOU HAVE ANY OF THE FOLLOWING:      Difficulty breathing              Chills and/or fever over 101 F   Persistent vomiting and/or vomiting blood   Severe abdominal pain   Severe chest pain   Black, tarry stools   Bleeding- more than one tablespoon   Any other symptom or condition that you feel may need urgent attention  Your doctor recommends these additional instructions:  If any biopsies were taken, your doctors clinic will contact you in 1 to 2   weeks with any results.  - Discharge patient to home.   - Resume previous diet.   - Use Prilosec (omeprazole) 40 mg PO daily for 8 weeks.   - Await pathology results.   - Repeat upper endoscopy in 1 year for surveillance.   - Continue to follow with outpatient hepatology.  For questions, problems or results please call your physician - Polo Montaño MD at Work:  [phone].  [facility], EMERGENCY ROOM PHONE NUMBER: [phone]  IF A COMPLICATION OR EMERGENCY SITUATION ARISES AND YOU ARE UNABLE TO REACH   YOUR PHYSICIAN - GO DIRECTLY TO THE EMERGENCY ROOM.  Polo Montaño MD  10/4/2023 10:53:17 AM  This report has been verified and signed electronically.  Dear patient,  As a result of recent federal legislation (The Federal Cures Act), you may   receive lab or pathology results from your procedure in your [facility]   account before your physician is able to contact you. Your physician or   their representative will relay the results to you with their   recommendations at their soonest availability.  Thank you,  PROVATION

## 2023-10-04 NOTE — H&P
Short Stay Endoscopy History and Physical    PCP - Cailin Chávez MD (Inactive)     Procedure - EGD  ASA - per anesthesia  Mallampati - per anesthesia  History of Anesthesia problems - no  Family history Anesthesia problems -  no   Plan of anesthesia - General, MAC    HPI:  This is a 58 y.o. male here for evaluation of :       Variceal surveillance    ROS:  Constitutional: No fevers, chills, No weight loss  CV: No chest pain  Pulm: No cough, No shortness of breath  Ophtho: No vision changes  GI: see HPI  Derm: No rash    Medical History:  has a past medical history of History of alcohol abuse.    Surgical History:  has a past surgical history that includes head procedure.    Family History: family history is not on file.. Otherwise no colon cancer, inflammatory bowel disease, or GI malignancies.    Social History:  reports that he has been smoking cigarettes. He has never used smokeless tobacco. He reports current alcohol use of about 20.0 standard drinks of alcohol per week. He reports that he does not use drugs.    Review of patient's allergies indicates:  No Known Allergies    Medications:   Medications Prior to Admission   Medication Sig Dispense Refill Last Dose    diclofenac sodium (VOLTAREN) 1 % Gel Apply 4 g topically 4 (four) times daily. (Patient not taking: Reported on 9/1/2023) 4 each 3     furosemide (LASIX) 20 MG tablet Take 2 tablets (40 mg total) by mouth once daily. 60 tablet 2     gabapentin (NEURONTIN) 300 MG capsule Take 1 capsule (300 mg total) by mouth 2 (two) times daily. (Patient not taking: Reported on 6/20/2023) 60 capsule 11     lactulose (CHRONULAC) 20 gram/30 mL Soln Take 30 mLs (20 g total) by mouth once daily. 900 mL 3     sofosbuvir-velpatasvir 400-100 mg Tab Take 1 tablet by mouth once daily. 28 tablet 5     spironolactone (ALDACTONE) 50 MG tablet Take 2 tablets (100 mg total) by mouth once daily. 60 tablet 2        Physical Exam:    Vital Signs: There were no vitals filed for  this visit.    Gen: NAD, lying comfortably  HENT: NCAT, oropharynx clear  Eyes: anicteric sclerae, EOMI grossly  Neck: supple, no visible masses/goiter  Cardiac: RRR  Lungs: non-labored breathing  Abd: soft, NT/ND, normoactive BS  Ext: no LE edema, warm, well perfused  Skin: skin intact on exposed body parts, no visible rashes, lesions  Neuro: A&Ox4, neuro exam grossly intact, moves all extremities  Psych: appropriate mood, affect      Labs:  Lab Results   Component Value Date    WBC 6.10 07/11/2023    HGB 10.8 (L) 07/11/2023    HCT 31.3 (L) 07/11/2023     (L) 07/11/2023    CHOL 155 03/31/2021    TRIG 42 03/31/2021    HDL 63 03/31/2021    ALT 28 09/01/2023    AST 53 (H) 09/01/2023     09/01/2023    K 3.8 09/01/2023     09/01/2023    CREATININE 0.9 09/01/2023    BUN 4 (L) 09/01/2023    CO2 23 09/01/2023    PSA 2.9 08/27/2021    INR 1.4 (H) 09/01/2023       Plan:  EGD     I have explained the risks and benefits of endoscopy procedures to the patient including but not limited to bleeding, perforation, infection, and death.  The patient was asked if they understand and allowed to ask any further questions to their satisfaction.      Polo Montaño MD

## 2023-10-04 NOTE — TRANSFER OF CARE
"Anesthesia Transfer of Care Note    Patient: Madhav Ruiz    Procedure(s) Performed: Procedure(s) (LRB):  EGD (ESOPHAGOGASTRODUODENOSCOPY) (N/A)    Patient location: [facility]    Anesthesia Type: general    Transport from OR: Transported from OR on room air with adequate spontaneous ventilation    Post pain: adequate analgesia    Post assessment: no apparent anesthetic complications    Post vital signs: stable    Level of consciousness: responds to stimulation    Nausea/Vomiting: no nausea/vomiting    Complications: none    Transfer of care protocol was followed      Last vitals:   Visit Vitals  BP 98/51 (Patient Position: Lying)   Pulse 79   Temp 36.7 °C (98.1 °F) (Temporal)   Resp 16   Ht 6' 1" (1.854 m)   Wt 82.1 kg (181 lb)   SpO2 100%   BMI 23.88 kg/m²     "

## 2023-10-04 NOTE — ANESTHESIA PREPROCEDURE EVALUATION
10/04/2023  Madhav Ruiz is a 58 y.o., male.      Pre-op Assessment    I have reviewed the Patient Summary Reports.     I have reviewed the Nursing Notes.       Review of Systems  Anesthesia Hx:  No problems with previous Anesthesia    Hematology/Oncology:  Hematology Normal   Oncology Normal     EENT/Dental:EENT/Dental Normal   Cardiovascular:  Cardiovascular Normal     Pulmonary:  Pulmonary Normal    Renal/:  Renal/ Normal     Hepatic/GI:   Liver Disease, Hepatitis    Musculoskeletal:  Musculoskeletal Normal    Neurological:  Neurology Normal    Endocrine:  Endocrine Normal    Dermatological:  Skin Normal    Psych:  Psychiatric Normal           Physical Exam  General: Well nourished    Airway:  Mallampati: II   Mouth Opening: Normal  TM Distance: Normal  Tongue: Normal  Neck ROM: Normal ROM    Dental:  Intact        Anesthesia Plan  Type of Anesthesia, risks & benefits discussed:    Anesthesia Type: Gen Natural Airway  Intra-op Monitoring Plan: Standard ASA Monitors  Post Op Pain Control Plan: multimodal analgesia  Induction:  IV  Airway Plan: Direct  Informed Consent: Informed consent signed with the Patient and all parties understand the risks and agree with anesthesia plan.  All questions answered. Patient consented to blood products? No  ASA Score: 3  Day of Surgery Review of History & Physical: H&P Update referred to the surgeon/provider.    Ready For Surgery From Anesthesia Perspective.     .

## 2023-10-04 NOTE — ANESTHESIA POSTPROCEDURE EVALUATION
Anesthesia Post Evaluation    Patient: Madhav Ruiz    Procedure(s) Performed: Procedure(s) (LRB):  EGD (ESOPHAGOGASTRODUODENOSCOPY) (N/A)    Final Anesthesia Type: general      Patient location during evaluation: PACU  Patient participation: Yes- Able to Participate  Level of consciousness: awake and alert  Post-procedure vital signs: reviewed and stable  Pain management: adequate  Airway patency: patent    PONV status at discharge: No PONV  Anesthetic complications: no      Cardiovascular status: blood pressure returned to baseline  Respiratory status: spontaneous ventilation and room air  Hydration status: euvolemic  Follow-up not needed.          Vitals Value Taken Time   /75 10/04/23 1111   Temp 36.5 °C (97.7 °F) 10/04/23 1055   Pulse 90 10/04/23 1111   Resp 20 10/04/23 1111   SpO2 100 % 10/04/23 1111         No case tracking events are documented in the log.      Pain/Cyndy Score: Cyndy Score: 9 (10/4/2023 11:11 AM)

## 2023-10-05 ENCOUNTER — TELEPHONE (OUTPATIENT)
Dept: PRIMARY CARE CLINIC | Facility: CLINIC | Age: 58
End: 2023-10-05
Payer: MEDICAID

## 2023-10-05 NOTE — TELEPHONE ENCOUNTER
Returned the call no answer LVM.          ----- Message from Cris Moore sent at 10/5/2023  7:57 AM CDT -----  Contact: self [phone]  Pt requesting a call will like to know can get a same day appt for knots in breast.    Please call and advise

## 2023-10-10 DIAGNOSIS — A04.8 BACTERIAL INFECTION DUE TO H. PYLORI: Primary | ICD-10-CM

## 2023-10-10 LAB
FINAL PATHOLOGIC DIAGNOSIS: NORMAL
GROSS: NORMAL
Lab: NORMAL

## 2023-10-10 RX ORDER — DOXYCYCLINE 100 MG/1
100 CAPSULE ORAL 2 TIMES DAILY
Qty: 28 CAPSULE | Refills: 0 | Status: SHIPPED | OUTPATIENT
Start: 2023-10-10 | End: 2023-10-24

## 2023-10-10 RX ORDER — METRONIDAZOLE 500 MG/1
500 TABLET ORAL EVERY 8 HOURS
Qty: 42 TABLET | Refills: 0 | Status: SHIPPED | OUTPATIENT
Start: 2023-10-10 | End: 2023-10-24

## 2023-11-01 ENCOUNTER — TELEPHONE (OUTPATIENT)
Dept: HEPATOLOGY | Facility: CLINIC | Age: 58
End: 2023-11-01
Payer: MEDICAID

## 2023-11-02 NOTE — TELEPHONE ENCOUNTER
----- Message from Georgia Terry RN sent at 11/1/2023  1:49 PM CDT -----  Epclusa use/cirrhosis.  Possible missed doses because of insurance issues.  Patient did not have wk 4 labs drawn.  Multiple attempts made to coordinate with wife since he never answers phone.  Episode moved to hcc screening date in January.

## 2023-11-20 ENCOUNTER — TELEPHONE (OUTPATIENT)
Dept: HEPATOLOGY | Facility: CLINIC | Age: 58
End: 2023-11-20

## 2023-11-20 RX ORDER — SPIRONOLACTONE 50 MG/1
100 TABLET, FILM COATED ORAL DAILY
Qty: 60 TABLET | Refills: 2 | Status: SHIPPED | OUTPATIENT
Start: 2023-11-20 | End: 2023-11-24

## 2023-11-20 RX ORDER — FUROSEMIDE 20 MG/1
40 TABLET ORAL DAILY
Qty: 60 TABLET | Refills: 2 | Status: SHIPPED | OUTPATIENT
Start: 2023-11-20 | End: 2023-11-24

## 2023-11-20 RX ORDER — GABAPENTIN 300 MG/1
300 CAPSULE ORAL 2 TIMES DAILY
Qty: 60 CAPSULE | Refills: 11 | Status: SHIPPED | OUTPATIENT
Start: 2023-11-20 | End: 2024-11-19

## 2023-11-20 RX ORDER — LACTULOSE 10 G/15ML
20 SOLUTION ORAL DAILY
Qty: 900 ML | Refills: 3 | Status: SHIPPED | OUTPATIENT
Start: 2023-11-20

## 2023-11-20 NOTE — TELEPHONE ENCOUNTER
----- Message from Lisette Sandoval sent at 11/20/2023  8:28 AM CST -----  Regarding: Returning a Missed Call  Contact: Alana Ruiz, patient's wife  Returning a Missed Call    Caller: Alana Ruiz, patient's wife      Returning call to: Consuelo       Caller can be reached @: [phone] (Mobile    Nature of the call: Patient's wife calling to speak to nurse Consuelo regarding missed lab appt. Patient wants to get orders put in for 11/22/2023 around 7:00 AM at [facility]. Requesting a call back.

## 2023-11-20 NOTE — TELEPHONE ENCOUNTER
Pt's wife called requesting lab order be linked to upcoming appt 11/21. Pt was schd for labs in October and  missed his appt. Wife reschd labs for Wednesday. Orders linked to existing appt. GREGG CHANDLER

## 2023-11-22 ENCOUNTER — LAB VISIT (OUTPATIENT)
Dept: LAB | Facility: HOSPITAL | Age: 58
End: 2023-11-22
Attending: PHYSICIAN ASSISTANT
Payer: MEDICAID

## 2023-11-22 DIAGNOSIS — K74.60 HEPATIC CIRRHOSIS, UNSPECIFIED HEPATIC CIRRHOSIS TYPE, UNSPECIFIED WHETHER ASCITES PRESENT: ICD-10-CM

## 2023-11-22 DIAGNOSIS — B18.2 CHRONIC HEPATITIS C WITHOUT HEPATIC COMA: ICD-10-CM

## 2023-11-22 LAB
ALBUMIN SERPL BCP-MCNC: 2.8 G/DL (ref 3.5–5.2)
ALP SERPL-CCNC: 110 U/L (ref 55–135)
ALT SERPL W/O P-5'-P-CCNC: 12 U/L (ref 10–44)
AST SERPL-CCNC: 33 U/L (ref 10–40)
BASOPHILS # BLD AUTO: 0.03 K/UL (ref 0–0.2)
BASOPHILS NFR BLD: 0.6 % (ref 0–1.9)
BILIRUB DIRECT SERPL-MCNC: 1.1 MG/DL (ref 0.1–0.3)
BILIRUB SERPL-MCNC: 2.2 MG/DL (ref 0.1–1)
DIFFERENTIAL METHOD: ABNORMAL
EOSINOPHIL # BLD AUTO: 0.4 K/UL (ref 0–0.5)
EOSINOPHIL NFR BLD: 7.2 % (ref 0–8)
ERYTHROCYTE [DISTWIDTH] IN BLOOD BY AUTOMATED COUNT: 13.9 % (ref 11.5–14.5)
HCT VFR BLD AUTO: 33.3 % (ref 40–54)
HGB BLD-MCNC: 11.2 G/DL (ref 14–18)
IMM GRANULOCYTES # BLD AUTO: 0.01 K/UL (ref 0–0.04)
IMM GRANULOCYTES NFR BLD AUTO: 0.2 % (ref 0–0.5)
LYMPHOCYTES # BLD AUTO: 2.7 K/UL (ref 1–4.8)
LYMPHOCYTES NFR BLD: 53.2 % (ref 18–48)
MCH RBC QN AUTO: 34.5 PG (ref 27–31)
MCHC RBC AUTO-ENTMCNC: 33.6 G/DL (ref 32–36)
MCV RBC AUTO: 103 FL (ref 82–98)
MONOCYTES # BLD AUTO: 0.9 K/UL (ref 0.3–1)
MONOCYTES NFR BLD: 17 % (ref 4–15)
NEUTROPHILS # BLD AUTO: 1.1 K/UL (ref 1.8–7.7)
NEUTROPHILS NFR BLD: 21.8 % (ref 38–73)
NRBC BLD-RTO: 0 /100 WBC
PLATELET # BLD AUTO: 112 K/UL (ref 150–450)
PMV BLD AUTO: 13.9 FL (ref 9.2–12.9)
PROT SERPL-MCNC: 7.6 G/DL (ref 6–8.4)
RBC # BLD AUTO: 3.25 M/UL (ref 4.6–6.2)
WBC # BLD AUTO: 5 K/UL (ref 3.9–12.7)

## 2023-11-22 PROCEDURE — 36415 COLL VENOUS BLD VENIPUNCTURE: CPT | Performed by: PHYSICIAN ASSISTANT

## 2023-11-22 PROCEDURE — 87522 HEPATITIS C REVRS TRNSCRPJ: CPT | Performed by: PHYSICIAN ASSISTANT

## 2023-11-22 PROCEDURE — 80076 HEPATIC FUNCTION PANEL: CPT | Performed by: PHYSICIAN ASSISTANT

## 2023-11-22 PROCEDURE — 85025 COMPLETE CBC W/AUTO DIFF WBC: CPT | Performed by: PHYSICIAN ASSISTANT

## 2023-11-24 LAB
HCV RNA SERPL QL NAA+PROBE: DETECTED
HCV RNA SPEC NAA+PROBE-ACNC: <12 IU/ML

## 2023-11-24 RX ORDER — SPIRONOLACTONE 50 MG/1
100 TABLET, FILM COATED ORAL
Qty: 60 TABLET | Refills: 1 | Status: SHIPPED | OUTPATIENT
Start: 2023-11-24

## 2023-11-24 RX ORDER — FUROSEMIDE 20 MG/1
40 TABLET ORAL
Qty: 60 TABLET | Refills: 1 | Status: SHIPPED | OUTPATIENT
Start: 2023-11-24

## 2023-11-24 RX ORDER — PANTOPRAZOLE SODIUM 40 MG/1
40 TABLET, DELAYED RELEASE ORAL
Qty: 30 TABLET | Refills: 11 | Status: SHIPPED | OUTPATIENT
Start: 2023-11-24

## 2023-11-24 NOTE — TELEPHONE ENCOUNTER
Pt began 24 weeks Epclusa on 9/13/23  Original anticipated treatment end date: 2/27/24   Exact EOT date ??? Due to missed doses, insurance, adherence issues  F 4 decompensated  Ebony 1A  Prior HCV treatment: No      11/22  CBC, LFT stable  HCV <12, detected    Pls call pt / wife  Labs stable  Liver numbers look better  Hep C now too low for lab to count  Continue epclusa, one pill each day. Important to try and not to miss doses.      Consuelo - can you ask pt to bring epclusa from home w/ him to appt in Jan so we can clarify his exact end of Rx date?    Next appts:  - labs, u/s, visit - January  - LFT, HCV RNA - SVR12 - 5/27/24      thanks

## 2023-12-06 ENCOUNTER — TELEPHONE (OUTPATIENT)
Dept: HEPATOLOGY | Facility: CLINIC | Age: 58
End: 2023-12-06
Payer: MEDICAID

## 2023-12-06 NOTE — TELEPHONE ENCOUNTER
Pt began 24 weeks Epclusa on 9/13/23  Anticipated treatment end date: 2/27/24  F 4 decompensated  Ebony 1A  Prior HCV treatment: No      11/22  HCV <12, detected  LFT improving  CBC stable    Pls call pt / wife  Labs 11/22 show Hep C med is working so far  Hep C virus is now too low for the lab to count  Continue full 24 weeks of epclusa. VERY IMPORTANT to not miss doses!!!    Next appts:  - HCC testing and HCV RNA, Peth - 1/12/24  - F/u visit 1/19/24  - LFT, HCV RNA - SVR12 - 5/27/24      thanks

## 2024-01-12 ENCOUNTER — TELEPHONE (OUTPATIENT)
Dept: HEPATOLOGY | Facility: CLINIC | Age: 59
End: 2024-01-12
Payer: MEDICAID

## 2024-01-12 ENCOUNTER — TELEPHONE (OUTPATIENT)
Dept: PRIMARY CARE CLINIC | Facility: CLINIC | Age: 59
End: 2024-01-12
Payer: MEDICAID

## 2024-01-12 ENCOUNTER — HOSPITAL ENCOUNTER (OUTPATIENT)
Dept: RADIOLOGY | Facility: HOSPITAL | Age: 59
Discharge: HOME | End: 2024-01-12
Attending: PHYSICIAN ASSISTANT
Payer: MEDICAID

## 2024-01-12 DIAGNOSIS — K74.60 HEPATIC CIRRHOSIS, UNSPECIFIED HEPATIC CIRRHOSIS TYPE, UNSPECIFIED WHETHER ASCITES PRESENT: ICD-10-CM

## 2024-01-12 DIAGNOSIS — B18.2 CHRONIC HEPATITIS C WITHOUT HEPATIC COMA: ICD-10-CM

## 2024-01-12 PROCEDURE — 76705 ECHO EXAM OF ABDOMEN: CPT | Mod: 26,,, | Performed by: STUDENT IN AN ORGANIZED HEALTH CARE EDUCATION/TRAINING PROGRAM

## 2024-01-12 PROCEDURE — 76705 ECHO EXAM OF ABDOMEN: CPT | Mod: TC

## 2024-01-12 NOTE — TELEPHONE ENCOUNTER
U/S abd 1/12/24:  Left pleural fluid noted  No ascites      Pls call pt  U/S showed he has some fluid in his left lung  This is not related to his liver disease  If dyspnea, CP -> to ER  Otherwise, he needs to f/u w/ PCP for this. Pls send copy there.    Keep upcoming appt w/ me    thx

## 2024-01-12 NOTE — TELEPHONE ENCOUNTER
Returned the patient call to inform her to report to ER. Patient informed to schedule an appointment.      ----- Message from Ambreen Fraire sent at 1/12/2024  1:58 PM CST -----  Contact: Alana/Spouse [phone]  Requesting a sooner appt, patient has fluid on the lung, and he is liver patient (serosis). Does not know what to do at this point, does he need a referral to a Pulmonologist? .    Please call and advise.    Thank You

## 2024-01-12 NOTE — TELEPHONE ENCOUNTER
I spoke with patient's wife and msg from PA Scheuermann relayed.  She states patient was previously followed by Antonella Gaxiola NP at [facility] but now has to followup with JEREMÍAS Hernandez for abnormal US finding.  Patient scheduled to see CECE Kraft on 3/5/24.  This message is being forwarded to provider.

## 2024-01-19 ENCOUNTER — TELEPHONE (OUTPATIENT)
Dept: HEPATOLOGY | Facility: CLINIC | Age: 59
End: 2024-01-19
Payer: MEDICAID

## 2024-01-19 NOTE — TELEPHONE ENCOUNTER
Pt scheduled w/ video visit today but didn't log on  Per [facility] notes he was admitted to [facility] ([phone], inpt addiction treatment) for 28d program on 1/18/24    Chart reviewed  PC to Adela (nurse at [facility]) and to pt:    EGD 10/4/24: Grade I EV, PHG, antral ulcer HPylori positive   Pt prescribed protonix 40mg x 8 wks by GI at that time, still taking it now   Rx for HPylori prescribed 10/10/23   Denies any abd pain, dyspeptic symptoms    Still taking epclusa, 5th fill sent to [facility] by [facility].earlier today  He should be home for final fill next month    Discussed that recent labs look good: MELD down to 11, HCV RNA neg, U/S w/ liver lesions  Will plan on f/u visit w/ me in ~ 5-6 wks once out of [facility]    Orders given to Ruthie RN at [facility]:  D/c protonix  Continue epclusa,  1 pill daily  Standing orders include pepto bismal and TUMS - need to separate from epclusa by at least 4 hours  Famotidine 40mg, once daily, dosed simultaneously w/ Epclusa can also be used      _____________________________________________  Consuelo - pls call wife  Tell her I spoke w/ pt at [facility] and reviewed labs / u/s w/ him.  Results show liver numbers are improving and Hep C med is working.  U/S looked fine.     Schedule appt for pt w/ me in 5-6 wks after he is out of [facility] (will update actual EOT date at this visit to adjust SVR labs)  And pls also tell wife I am thinking of both her and her  and wishing them both the best during this period.

## 2024-01-19 NOTE — TELEPHONE ENCOUNTER
Spoke to Ruthie tirado for pt to take naltrexone, not a contraindication  Recommend CMP in 2 weeks and monitor for liver dysfunction since there is a small risk of hepatitis and liver dysfunction

## 2024-01-19 NOTE — TELEPHONE ENCOUNTER
----- Message from Georgia Terry RN sent at 1/19/2024  3:45 PM CST -----  Patient currently at [facility] ([phone]).  He wants to take Naltrexone 50 mg daily.  He needs okay to take med per staff.  Okay to pass on your response to any nurse at facility.  Please advise.

## 2024-01-22 NOTE — TELEPHONE ENCOUNTER
I spoke with patient's wife and msg from PA Scheuermann relayed.  She asked that appt with provider be scheduled 3/22/24; done and reminder notice mailed.

## 2024-02-12 ENCOUNTER — OFFICE VISIT (OUTPATIENT)
Dept: URGENT CARE | Facility: CLINIC | Age: 59
End: 2024-02-12
Payer: MEDICAID

## 2024-02-12 ENCOUNTER — TELEPHONE (OUTPATIENT)
Dept: ORTHOPEDICS | Facility: CLINIC | Age: 59
End: 2024-02-12
Payer: MEDICAID

## 2024-02-12 VITALS
HEART RATE: 65 BPM | SYSTOLIC BLOOD PRESSURE: 99 MMHG | RESPIRATION RATE: 16 BRPM | HEIGHT: 71 IN | WEIGHT: 198.44 LBS | DIASTOLIC BLOOD PRESSURE: 55 MMHG | BODY MASS INDEX: 27.78 KG/M2 | OXYGEN SATURATION: 99 % | TEMPERATURE: 98 F

## 2024-02-12 DIAGNOSIS — M25.512 CHRONIC LEFT SHOULDER PAIN: Primary | ICD-10-CM

## 2024-02-12 DIAGNOSIS — G89.29 CHRONIC LEFT SHOULDER PAIN: Primary | ICD-10-CM

## 2024-02-12 PROCEDURE — 99213 OFFICE O/P EST LOW 20 MIN: CPT | Mod: S$GLB,,, | Performed by: PHYSICIAN ASSISTANT

## 2024-02-12 RX ORDER — DICLOFENAC SODIUM 10 MG/G
2 GEL TOPICAL 2 TIMES DAILY
Qty: 20 G | Refills: 0 | Status: SHIPPED | OUTPATIENT
Start: 2024-02-12

## 2024-02-12 RX ORDER — KETOROLAC TROMETHAMINE 30 MG/ML
30 INJECTION, SOLUTION INTRAMUSCULAR; INTRAVENOUS
Status: COMPLETED | OUTPATIENT
Start: 2024-02-12 | End: 2024-02-12

## 2024-02-12 RX ORDER — METHOCARBAMOL 500 MG/1
500 TABLET, FILM COATED ORAL 4 TIMES DAILY PRN
Qty: 20 TABLET | Refills: 0 | Status: SHIPPED | OUTPATIENT
Start: 2024-02-12

## 2024-02-12 RX ADMIN — KETOROLAC TROMETHAMINE 30 MG: 30 INJECTION, SOLUTION INTRAMUSCULAR; INTRAVENOUS at 07:02

## 2024-02-12 NOTE — TELEPHONE ENCOUNTER
Returned the patient's phone call in regards to their message. I got them schedule to see Dr. Rae at the [place] location on 02/26/24 at 1:40. Verbalized understanding.

## 2024-02-12 NOTE — TELEPHONE ENCOUNTER
Returned the patient's phone call in regards to their message. I got the patient schedule to see Dr. Cavazos on 03/22/24 at 7:00. Verbalized understanding.

## 2024-02-12 NOTE — TELEPHONE ENCOUNTER
----- Message from Chiquita Lennon sent at 2/12/2024 10:50 AM CST -----  Regarding: soon appt  Contact: Alana  .Type:  Sooner Apoointment Request    Caller is requesting a sooner appointment.  Caller declined first available appointment listed below.  Caller will not accept being placed on the waitlist and is requesting a message be sent to doctor.  Name of Caller: Alana  When is the first available appointment?  Symptoms: shoulder and knee pain   Would the patient rather a call back or a response via [facility]? call  Best Call Back Number: [phone] (home)    Additional Information:  last seen in January 2023

## 2024-02-12 NOTE — TELEPHONE ENCOUNTER
----- Message from Chiquita Lennon sent at 2/12/2024 10:45 AM CST -----  Regarding: soon appt  Contact: Alana  Type:  Sooner Apoointment Request    Caller is requesting a sooner appointment.  Caller declined first available appointment listed below.  Caller will not accept being placed on the waitlist and is requesting a message be sent to doctor.  Name of Caller: Alana   When is the first available appointment?  Symptoms: shoulder and knee pain   Would the patient rather a call back or a response via [facility]? call  Best Call Back Number: [phone] (home)    Additional Information: Alana  is requesting a callback from the nurse to be scheduled for a EP appointment due to knee and shoulder.  ([facility])

## 2024-02-12 NOTE — LETTER
February 12, 2024      [facility]  [id number] [place] [address] [address]  [facility] [address]  Phone: [phone]       Patient: Madhav Ruiz   YOB: 1965  Date of Visit: 02/12/2024    To Whom It May Concern:    RONNI Ruiz  was at [facility] on 02/12/2024. The patient may return to work/school on 02/13/24 with no restrictions. If you have any questions or concerns, or if I can be of further assistance, please do not hesitate to contact me.    Sincerely,    Dasha Bella PA-C

## 2024-02-13 NOTE — PATIENT INSTRUCTIONS
Please follow up with your Primary Care provider or Orthopedic specialist within 5 days if your signs and symptoms have not resolved. If your condition worsens, or you develop new symptoms, we recommend that you receive another evaluation at the Emergency Room immediately or contact your primary medical clinic to discuss your concerns.    You must understand that you have received an Urgent Care treatment only and that you may be released before all of your medical problems are known or treated. You, the patient, will arrange for follow up care as instructed.     RED FLAGS/WARNING SYMPTOMS DISCUSSED WITH PATIENT THAT WOULD WARRANT EMERGENT MEDICAL ATTENTION. PATIENT VERBALIZED UNDERSTANDING.

## 2024-02-13 NOTE — PROGRESS NOTES
"Subjective:      Patient ID: Madhav Ruiz is a 58 y.o. male.    Vitals:  height is 5' 10.67" (1.795 m) and weight is 90 kg (198 lb 6.6 oz). His tympanic temperature is 97.9 °F (36.6 °C). His blood pressure is 99/55 (abnormal) and his pulse is 65. His respiration is 16 and oxygen saturation is 99%.     Chief Complaint: Shoulder Pain (Left shoulder pain/)    Pt presents to the clinic today with left shoulder pain that has gotten worse over the last 2 days with no known injury. Hx of chronic shoulder pain (has been seen in  in the past for this and is established with Ortho for other chronic pains). Pain worse with raising arm or lifting. No numbness/tingling, shoulder swelling, or skin discoloration. Tried to get appt with Ortho, soonest available was 02/26/24. Pt took 500 mg Tylenol yesterday and today with little relief. States he no longer takes Gabapentin. Accompanied by his wife.     Shoulder Pain   The pain is present in the left shoulder. This is a new problem. The current episode started in the past 7 days. There has been no history of extremity trauma. The problem occurs constantly. The problem has been gradually worsening. The quality of the pain is described as aching. The pain is at a severity of 8/10. The pain is moderate. Associated symptoms include a limited range of motion. Pertinent negatives include no fever, headaches, inability to bear weight, itching, joint locking, joint swelling, numbness, stiffness, tingling or visual symptoms. He has tried acetaminophen (Lidocaine patches) for the symptoms. The treatment provided no relief. Family history does not include arthritis. There is no history of diabetes, Injuries to Extremity or migraines.       Constitution: Negative for fever.   Cardiovascular: Negative.    Respiratory: Negative.     Musculoskeletal:  Positive for joint pain and abnormal ROM of joint. Negative for trauma and joint swelling.   Skin: Negative.    Neurological:  Negative for " headaches, numbness and tingling.      Objective:     Physical Exam   Constitutional: He appears well-developed.  Non-toxic appearance. He does not appear ill. No distress.   HENT:   Head: Normocephalic and atraumatic.   Ears:   Right Ear: External ear normal.   Left Ear: External ear normal.   Nose: Nose normal.   Eyes: Conjunctivae and EOM are normal.   Neck: Neck supple.   Pulmonary/Chest: Effort normal and breath sounds normal.   Abdominal: Normal appearance.   Musculoskeletal:      Left shoulder: He exhibits decreased range of motion (adduction, abduction <90 degrees), tenderness (anterior joint space) and decreased strength (adduction and abduction against resistance). He exhibits no swelling, no crepitus, no deformity and normal pulse.      Comments: Left bicep, tricep, and hand  5/5 strength   Neurological: no focal deficit. He is alert. He displays no weakness. Gait normal.   Skin: Skin is warm, dry, not diaphoretic, not pale and no rash.   Psychiatric: His behavior is normal.       Assessment:     1. Chronic left shoulder pain        Plan:     Given hx of chronic pain with no new injury, will hold off on XR at this time and defer to ortho for imaging. Pt given Toradol injection in clinic.  Can use diclofenac gel as needed for pain.  Advised to ice periodically throughout the day and avoid activities that aggravate pain.  Muscle relaxer as needed.  Advised this may cause drowsiness.  Close follow-up with ortho as scheduled.  Chronic left shoulder pain  -     ketorolac injection 30 mg  -     methocarbamoL (ROBAXIN) 500 MG Tab; Take 1 tablet (500 mg total) by mouth 4 (four) times daily as needed (muscle spasms).  Dispense: 20 tablet; Refill: 0  -     diclofenac sodium (VOLTAREN) 1 % Gel; Apply 2 g topically 2 (two) times daily.  Dispense: 20 g; Refill: 0

## 2024-02-23 DIAGNOSIS — M25.512 LEFT SHOULDER PAIN, UNSPECIFIED CHRONICITY: Primary | ICD-10-CM

## 2024-03-04 ENCOUNTER — TELEPHONE (OUTPATIENT)
Dept: ORTHOPEDICS | Facility: CLINIC | Age: 59
End: 2024-03-04
Payer: MEDICAID

## 2024-03-04 ENCOUNTER — HOSPITAL ENCOUNTER (OUTPATIENT)
Dept: RADIOLOGY | Facility: HOSPITAL | Age: 59
Discharge: HOME | End: 2024-03-04
Attending: STUDENT IN AN ORGANIZED HEALTH CARE EDUCATION/TRAINING PROGRAM
Payer: MEDICAID

## 2024-03-04 ENCOUNTER — OFFICE VISIT (OUTPATIENT)
Dept: ORTHOPEDICS | Facility: CLINIC | Age: 59
End: 2024-03-04
Payer: MEDICAID

## 2024-03-04 VITALS — HEIGHT: 70 IN | BODY MASS INDEX: 28.35 KG/M2 | WEIGHT: 198 LBS

## 2024-03-04 DIAGNOSIS — M75.102 TEAR OF LEFT ROTATOR CUFF, UNSPECIFIED TEAR EXTENT, UNSPECIFIED WHETHER TRAUMATIC: Primary | ICD-10-CM

## 2024-03-04 DIAGNOSIS — M25.562 PAIN IN BOTH KNEES, UNSPECIFIED CHRONICITY: ICD-10-CM

## 2024-03-04 DIAGNOSIS — M25.562 PAIN IN BOTH KNEES, UNSPECIFIED CHRONICITY: Primary | ICD-10-CM

## 2024-03-04 DIAGNOSIS — M25.561 PAIN IN BOTH KNEES, UNSPECIFIED CHRONICITY: ICD-10-CM

## 2024-03-04 DIAGNOSIS — M25.561 PAIN IN BOTH KNEES, UNSPECIFIED CHRONICITY: Primary | ICD-10-CM

## 2024-03-04 DIAGNOSIS — M25.512 LEFT SHOULDER PAIN, UNSPECIFIED CHRONICITY: ICD-10-CM

## 2024-03-04 PROCEDURE — 73564 X-RAY EXAM KNEE 4 OR MORE: CPT | Mod: TC,50,PO

## 2024-03-04 PROCEDURE — 73564 X-RAY EXAM KNEE 4 OR MORE: CPT | Mod: 26,RT,, | Performed by: RADIOLOGY

## 2024-03-04 PROCEDURE — 73030 X-RAY EXAM OF SHOULDER: CPT | Mod: TC,PO,LT

## 2024-03-04 PROCEDURE — 3008F BODY MASS INDEX DOCD: CPT | Mod: CPTII,,, | Performed by: STUDENT IN AN ORGANIZED HEALTH CARE EDUCATION/TRAINING PROGRAM

## 2024-03-04 PROCEDURE — 99213 OFFICE O/P EST LOW 20 MIN: CPT | Mod: PBBFAC,25,PO | Performed by: STUDENT IN AN ORGANIZED HEALTH CARE EDUCATION/TRAINING PROGRAM

## 2024-03-04 PROCEDURE — G2211 COMPLEX E/M VISIT ADD ON: HCPCS | Mod: S$PBB,,, | Performed by: STUDENT IN AN ORGANIZED HEALTH CARE EDUCATION/TRAINING PROGRAM

## 2024-03-04 PROCEDURE — 99999 PR PBB SHADOW E&M-EST. PATIENT-LVL III: CPT | Mod: PBBFAC,,, | Performed by: STUDENT IN AN ORGANIZED HEALTH CARE EDUCATION/TRAINING PROGRAM

## 2024-03-04 PROCEDURE — 99214 OFFICE O/P EST MOD 30 MIN: CPT | Mod: S$PBB,,, | Performed by: STUDENT IN AN ORGANIZED HEALTH CARE EDUCATION/TRAINING PROGRAM

## 2024-03-04 PROCEDURE — 73564 X-RAY EXAM KNEE 4 OR MORE: CPT | Mod: 26,LT,, | Performed by: RADIOLOGY

## 2024-03-04 PROCEDURE — 1159F MED LIST DOCD IN RCRD: CPT | Mod: CPTII,,, | Performed by: STUDENT IN AN ORGANIZED HEALTH CARE EDUCATION/TRAINING PROGRAM

## 2024-03-04 PROCEDURE — 73030 X-RAY EXAM OF SHOULDER: CPT | Mod: 26,LT,, | Performed by: RADIOLOGY

## 2024-03-04 PROCEDURE — 1160F RVW MEDS BY RX/DR IN RCRD: CPT | Mod: CPTII,,, | Performed by: STUDENT IN AN ORGANIZED HEALTH CARE EDUCATION/TRAINING PROGRAM

## 2024-03-04 RX ORDER — METHOCARBAMOL 500 MG/1
500 TABLET, FILM COATED ORAL 4 TIMES DAILY PRN
Qty: 40 TABLET | Refills: 1 | Status: SHIPPED | OUTPATIENT
Start: 2024-03-04 | End: 2024-03-24

## 2024-03-04 RX ORDER — MELOXICAM 15 MG/1
15 TABLET ORAL DAILY
Qty: 30 TABLET | Refills: 1 | Status: SHIPPED | OUTPATIENT
Start: 2024-03-04

## 2024-03-04 NOTE — PATIENT INSTRUCTIONS
Assessment:  Madhav Ruiz is a 58 y.o. male   Chief Complaint   Patient presents with    Left Shoulder - Pain       Encounter Diagnosis   Name Primary?    Tear of left rotator cuff, unspecified tear extent, unspecified whether traumatic Yes        Plan:  Stat MRI for left shoulder  Follow up at [facility] following MRI  Xrays for bilateral knees on way out    Follow-up: after MRI.    Thank you for choosing [facility] and Dr. Noah Rae for your orthopedic & sports medicine care. It is our goal to provide you with exceptional care that will help keep you healthy, active, and get you back in the game.    Please do not hesitate to reach out to us via email, phone, or [facility] with any questions, concerns, or feedback.    If you felt that you received exemplary care today, please consider leaving us feedback on [employer] at:  [email or web address]    If you are experiencing pain/discomfort ,or have questions after 5pm and would like to be connected to the [facility] on-call team, please call this number and specify which Sports Medicine provider is treating you: [phone]

## 2024-03-04 NOTE — TELEPHONE ENCOUNTER
LVM for patient returning his call.  Appt today looks like is for new issue related to a shoulder issue.  When looking at upcoming appts, looks like patient has appt scheduled with Dr. Cavazos, knee specialist on March 22 in [place]. Asked that patient return call if he has further questions and that we look forward to seeing him for his upcoming appt for his shoulder.   ----- Message from You Molina sent at 3/4/2024 10:32 AM CST -----  Contact: Alana Degroot  Pt is calling in regards to  pt knees and wanted to make sure that is will be address along with his shoulder at the appt on 03/04      Thanks

## 2024-03-04 NOTE — PROGRESS NOTES
Patient ID: Madhav Ruiz  YOB: 1965  MRN: [id number]    Chief Complaint: Pain of the Left Shoulder      Referred By: Self for Left Shoulder Pain    History of Present Illness: Madhav Ruiz is a left-hand dominant 58 y.o. male who presents today with left shoulder pain.  Patient reports that he has had intermittent left shoulder pain since 2020, but that the pain returned constant last month following patient playing basketball.  He reports he is unable to sleep on his shoulder, unable to turn a screw drive without pain and unable to  objects due to lack of strength and pain.  He describes the pain as a constant achy, throbbing pain and states he has numbness present in his arm.  He rates his pain today at a 7-8/10 and states the pain is in the deep anterior shoulder region.  He states that he went to  several weeks ago and received an IM injection which lasted approximately 1-2 days and that he has used Biofreeze to help with his pain. He has taken Meloxicam and Tizanidine but no longer has medication removing.  He is currently finishing up a prescription for Hepatitis C and has 8 pills remaining.     The patient is active in none.  Occupation: Unemployed      Past Medical History:   Past Medical History:   Diagnosis Date    Acid reflux     Edema of left lower extremity     Left knee    History of alcohol abuse      Past Surgical History:   Procedure Laterality Date    ESOPHAGOGASTRODUODENOSCOPY N/A 10/4/2023    Procedure: EGD (ESOPHAGOGASTRODUODENOSCOPY);  Surgeon: Polo Montaño MD;  Location: [address]);  Service: Gastroenterology;  Laterality: N/A;  referral: Jennifer Scheuermann, PA- possible banding / Hx varices past EGd at outside facility / Pt hx hematemesis- 2nd flr / prep ins on portal/ cirrhosis - labs - ERW  9/27-precall complete-MS    head procedure       History reviewed. No pertinent family history.  Social History     Socioeconomic History    Marital status:     Tobacco Use    Smoking status: Every Day     Current packs/day: 0.50     Types: Cigarettes    Smokeless tobacco: Never   Substance and Sexual Activity    Alcohol use: Yes     Alcohol/week: 20.0 standard drinks of alcohol     Types: 20 Shots of liquor per week    Drug use: Never    Sexual activity: Yes     Partners: Female     Medication List with Changes/Refills   Current Medications    DICLOFENAC SODIUM (VOLTAREN) 1 % GEL    Apply 2 g topically 2 (two) times daily.    FLU VACC [id number] 6MOS UP,PF, (FLUARIX QUAD [id number], PF,) 60 MCG (15 MCG X 4)/0.5 ML SYRG    use as directed    FUROSEMIDE (LASIX) 20 MG TABLET    TAKE 2 TABLETS BY MOUTH ONCE DAILY    GABAPENTIN (NEURONTIN) 300 MG CAPSULE    Take 1 capsule (300 mg total) by mouth 2 (two) times daily.    LACTULOSE (CHRONULAC) 20 GRAM/30 ML SOLN    Take 30 mLs (20 g total) by mouth once daily.    METHOCARBAMOL (ROBAXIN) 500 MG TAB    Take 1 tablet (500 mg total) by mouth 4 (four) times daily as needed (muscle spasms).    PANTOPRAZOLE (PROTONIX) 40 MG TABLET    TAKE ONE TABLET BY MOUTH ONE TIME DAILY    SOFOSBUVIR-VELPATASVIR 400-100 MG TAB    Take 1 tablet by mouth once daily.    SPIRONOLACTONE (ALDACTONE) 50 MG TABLET    TAKE 2 TABLETS BY MOUTH ONCE DAILY     Review of patient's allergies indicates:  No Known Allergies    Physical Exam:   Body mass index is 28.41 kg/m².    GENERAL: Well appearing, in no acute distress.  HEAD: Normocephalic and atraumatic.  ENT: External ears and nose grossly normal.  EYES: EOMI bilaterally  PULMONARY: Respirations are grossly even and non-labored.  NEURO: Awake, alert, and oriented x 3.  SKIN: No obvious rashes appreciated.  PSYCH: Mood & affect are appropriate.    Detailed MSK exam:     Left shoulder exam:   -ROM: abduction 110, forward flexion 100, external rotation 80, internal rotation 60  -empty can test positive, resisted ER pain but no weakness, belly press pain but no weakness  -moralez test negative, neers test  negative, whipple test positive  -biceps load test negative, yerguson test negative, Pahala's test negative  -sensation intact, pulses 2+  -TTP: bicipital groove    Right shoulder exam:   -ROM: abduction 130, forward flexion 130, external rotation 80, internal rotation 70  -empty can test negative, resisted ER negative, belly press negative  -moralez test negative, neers test negative, whipple test negative  -biceps load test negative, yerguson test negative, Pahala's test negative  -sensation intact, pulses 2+  -TTP: none      Imaging:  X-Ray Shoulder 2 or More Views Left  Narrative: EXAMINATION:  XR SHOULDER COMPLETE 2 OR MORE VIEWS LEFT    CLINICAL HISTORY:  Pain in left shoulder    TECHNIQUE:  Two or three views of the left shoulder were performed.    COMPARISON:  04/24/2022    FINDINGS:  No acute abnormality.  Similar mild AC joint degenerative findings.  Lower cervical spine degenerative changes.  Lung parenchyma clear.  Impression: As above    Electronically signed by: Jeancarlos Valenzuela MD  Date:    03/04/2024  Time:    13:38        Relevant imaging results were reviewed and interpreted by me and per my read shows mild AC arthritic changes.  This was discussed with the patient and / or family today.     Assessment:  Madhav Ruiz is a 58 y.o. male presenting with chronic left shoulder pain.   History, physical and radiographs are consistent with a likely diagnosis of chronic full thickness RC tear, RC tendinopathy, AC OA.   Plan: MRI ordered. Consider PT and steroid injection if not immediately surgical. Continue conservative management for pain.   Follow up after MRI to go over results and determine next steps in management. All questions answered.      Tear of left rotator cuff, unspecified tear extent, unspecified whether traumatic  -     MRI Shoulder Without Contrast Left; Future; Expected date: 03/04/2024    Left shoulder pain, unspecified chronicity  -     MRI Shoulder Without Contrast Left; Future;  Expected date: 03/04/2024         Today's visit is associated with current or anticipated ongoing medical care related to this patient's diagnosis of osteoarthritis.  Currently there is no cure of osteoarthritis and the patient will require regular follow up to manage symptoms and progression.  The patient is to return to the office within a minimum of 3-6 months to review symptoms and function at that time.   CPT code      A copy of today's visit note has been sent to the referring provider.     Electronically signed:  Noah Rae MD, MPH  03/04/2024  1:45 PM

## 2024-03-05 ENCOUNTER — TELEPHONE (OUTPATIENT)
Dept: SPORTS MEDICINE | Facility: CLINIC | Age: 59
End: 2024-03-05
Payer: MEDICAID

## 2024-03-05 ENCOUNTER — HOSPITAL ENCOUNTER (OUTPATIENT)
Dept: RADIOLOGY | Facility: HOSPITAL | Age: 59
Discharge: HOME | End: 2024-03-05
Attending: STUDENT IN AN ORGANIZED HEALTH CARE EDUCATION/TRAINING PROGRAM
Payer: MEDICAID

## 2024-03-05 DIAGNOSIS — M25.512 LEFT SHOULDER PAIN, UNSPECIFIED CHRONICITY: ICD-10-CM

## 2024-03-05 DIAGNOSIS — M75.102 TEAR OF LEFT ROTATOR CUFF, UNSPECIFIED TEAR EXTENT, UNSPECIFIED WHETHER TRAUMATIC: ICD-10-CM

## 2024-03-05 PROCEDURE — 73221 MRI JOINT UPR EXTREM W/O DYE: CPT | Mod: TC,LT

## 2024-03-05 PROCEDURE — 73221 MRI JOINT UPR EXTREM W/O DYE: CPT | Mod: 26,LT,, | Performed by: RADIOLOGY

## 2024-03-05 NOTE — TELEPHONE ENCOUNTER
Returned call to wife and r/s f/u appt with Dr. Rae to tomorrow at 2:20 to review MRI results.   ----- Message from See Hodges sent at 3/5/2024  9:24 AM CST -----  Contact: Alana/wife  .Type:  Patient Returning Call    Who Called:Alana  Who Left Message for Patient:Nurse  Does the patient know what this is regarding?:Yes  Would the patient rather a call back or a response via [facility]? Call back  Best Call Back Number:.[phone]  Additional Information: Na      Thanks  LUCIANO

## 2024-03-06 ENCOUNTER — OFFICE VISIT (OUTPATIENT)
Dept: SPORTS MEDICINE | Facility: CLINIC | Age: 59
End: 2024-03-06
Payer: MEDICAID

## 2024-03-06 VITALS — WEIGHT: 198 LBS | HEIGHT: 70 IN | BODY MASS INDEX: 28.35 KG/M2

## 2024-03-06 DIAGNOSIS — M67.922 TENDINOPATHY OF LEFT BICEPS TENDON: ICD-10-CM

## 2024-03-06 DIAGNOSIS — M67.912 TENDINOPATHY OF LEFT ROTATOR CUFF: ICD-10-CM

## 2024-03-06 DIAGNOSIS — M75.112 INCOMPLETE TEAR OF LEFT ROTATOR CUFF, UNSPECIFIED WHETHER TRAUMATIC: Primary | ICD-10-CM

## 2024-03-06 DIAGNOSIS — M25.512 LEFT SHOULDER PAIN, UNSPECIFIED CHRONICITY: ICD-10-CM

## 2024-03-06 PROCEDURE — 99999 PR PBB SHADOW E&M-EST. PATIENT-LVL IV: CPT | Mod: PBBFAC,,, | Performed by: STUDENT IN AN ORGANIZED HEALTH CARE EDUCATION/TRAINING PROGRAM

## 2024-03-06 PROCEDURE — 99214 OFFICE O/P EST MOD 30 MIN: CPT | Mod: PBBFAC,25 | Performed by: STUDENT IN AN ORGANIZED HEALTH CARE EDUCATION/TRAINING PROGRAM

## 2024-03-06 PROCEDURE — 3008F BODY MASS INDEX DOCD: CPT | Mod: CPTII,,, | Performed by: STUDENT IN AN ORGANIZED HEALTH CARE EDUCATION/TRAINING PROGRAM

## 2024-03-06 PROCEDURE — 1159F MED LIST DOCD IN RCRD: CPT | Mod: CPTII,,, | Performed by: STUDENT IN AN ORGANIZED HEALTH CARE EDUCATION/TRAINING PROGRAM

## 2024-03-06 PROCEDURE — 20611 DRAIN/INJ JOINT/BURSA W/US: CPT | Mod: PBBFAC | Performed by: STUDENT IN AN ORGANIZED HEALTH CARE EDUCATION/TRAINING PROGRAM

## 2024-03-06 PROCEDURE — 99214 OFFICE O/P EST MOD 30 MIN: CPT | Mod: 25,S$PBB,, | Performed by: STUDENT IN AN ORGANIZED HEALTH CARE EDUCATION/TRAINING PROGRAM

## 2024-03-06 PROCEDURE — 1160F RVW MEDS BY RX/DR IN RCRD: CPT | Mod: CPTII,,, | Performed by: STUDENT IN AN ORGANIZED HEALTH CARE EDUCATION/TRAINING PROGRAM

## 2024-03-06 PROCEDURE — 99999PBSHW PR PBB SHADOW TECHNICAL ONLY FILED TO HB: Mod: PBBFAC,,,

## 2024-03-06 RX ORDER — TRIAMCINOLONE ACETONIDE 40 MG/ML
40 INJECTION, SUSPENSION INTRA-ARTICULAR; INTRAMUSCULAR
Status: DISCONTINUED | OUTPATIENT
Start: 2024-03-06 | End: 2024-03-06 | Stop reason: HOSPADM

## 2024-03-06 RX ADMIN — TRIAMCINOLONE ACETONIDE 40 MG: 200 INJECTION, SUSPENSION INTRA-ARTICULAR; INTRAMUSCULAR at 02:03

## 2024-03-06 NOTE — PATIENT INSTRUCTIONS
Assessment:  Madhav Ruiz is a 58 y.o. male   Chief Complaint   Patient presents with    Left Shoulder - Pain       No diagnosis found.     Plan:  Cortizone steroid injection given today in left subacromial  We discussed the proper protocols after the injection such as no submerging pools, baths tubs, or hot tubs for 24 hr.  Showering is okay today.  We also discussed that blood sugars can be elevated after an injection and asked patient to properly checked her sugars over the next few days and contact their PCP if there are any concerns.  We discussed red flags such as fevers, chills, red, warm, tender joint at the area of injection to please seek medical care immediately.    Referral sent to [facility]  Apply topical diclofenac (Voltaren) up to 4 times a day to the affected area.  It can be bought over the counter at any local pharmacy.    Lidocaine patches can be bought over the counter  Patient can use ice as needed    Follow-up: 3 months or sooner if there are problems between now and then.    Thank you for choosing [facility] and Dr. Noah Rae for your orthopedic & sports medicine care. It is our goal to provide you with exceptional care that will help keep you healthy, active, and get you back in the game.    Please do not hesitate to reach out to us via email, phone, or MyChart with any questions, concerns, or feedback.    If you felt that you received exemplary care today, please consider leaving us feedback on [employer] at:  [email or web address]    If you are experiencing pain/discomfort ,or have questions after 5pm and would like to be connected to the [facility] on-call team, please call this number and specify which Sports Medicine provider is treating you: [phone]

## 2024-03-06 NOTE — PROCEDURES
Sports Medicine US - Guidance for Needle Placement    Date/Time: 3/6/2024 2:20 PM    Performed by: Noah Rae MD  Authorized by: Noah Rae MD  Preparation: Patient was prepped and draped in the usual sterile fashion.  Local anesthesia used: no    Anesthesia:  Local anesthesia used: no    Sedation:  Patient sedated: no    Patient tolerance: patient tolerated the procedure well with no immediate complications  Comments: Ultrasound guidance was used for needle localization. Images were saved and stored for documentation. The appropriate structures were visualized. Dynamic visualization of the needle was continuous throughout the procedures and maintained good position.

## 2024-03-06 NOTE — PROGRESS NOTES
Patient ID: Madhav Ruiz  YOB: 1965  MRN: [id number]    Chief Complaint: Pain of the Left Shoulder      Referred By: Self for Left Shoulder Pain    HPI: here for MRI review.     Interval History of Present Illness: Madhav Ruiz is a left-hand dominant 58 y.o. male who presents today with left shoulder pain.  Patient reports that he has had intermittent left shoulder pain since 2020, but that the pain returned constant last month following patient playing basketball.  He reports he is unable to sleep on his shoulder, unable to turn a screw drive without pain and unable to  objects due to lack of strength and pain.  He describes the pain as a constant achy, throbbing pain and states he has numbness present in his arm.  He rates his pain today at a 7-8/10 and states the pain is in the deep anterior shoulder region.  He states that he went to  several weeks ago and received an IM injection which lasted approximately 1-2 days and that he has used Biofreeze to help with his pain. He has taken Meloxicam and Tizanidine but no longer has medication removing.  He is currently finishing up a prescription for Hepatitis C and has 8 pills remaining.     The patient is active in none.  Occupation: Unemployed      Past Medical History:   Past Medical History:   Diagnosis Date    Acid reflux     Edema of left lower extremity     Left knee    History of alcohol abuse      Past Surgical History:   Procedure Laterality Date    ESOPHAGOGASTRODUODENOSCOPY N/A 10/4/2023    Procedure: EGD (ESOPHAGOGASTRODUODENOSCOPY);  Surgeon: Polo Montaño MD;  Location: [facility] (2ND FLR);  Service: Gastroenterology;  Laterality: N/A;  referral: Jennifer Scheuermann, PA- possible banding / Hx varices past EGd at outside facility / Pt hx hematemesis- 2nd flr / prep ins on portal/ cirrhosis - labs - ERW  9/27-precall complete-MS    head procedure       History reviewed. No pertinent family history.  Social History      Socioeconomic History    Marital status:    Tobacco Use    Smoking status: Every Day     Current packs/day: 0.50     Types: Cigarettes    Smokeless tobacco: Never   Substance and Sexual Activity    Alcohol use: Yes     Alcohol/week: 20.0 standard drinks of alcohol     Types: 20 Shots of liquor per week    Drug use: Never    Sexual activity: Yes     Partners: Female     Medication List with Changes/Refills   Current Medications    DICLOFENAC SODIUM (VOLTAREN) 1 % GEL    Apply 2 g topically 2 (two) times daily.    FLU VACC GI2766-35 6MOS UP,PF, (FLUARIX QUAD 4024-7145, PF,) 60 MCG (15 MCG X 4)/0.5 ML SYRG    use as directed    FUROSEMIDE (LASIX) 20 MG TABLET    TAKE 2 TABLETS BY MOUTH ONCE DAILY    GABAPENTIN (NEURONTIN) 300 MG CAPSULE    Take 1 capsule (300 mg total) by mouth 2 (two) times daily.    LACTULOSE (CHRONULAC) 20 GRAM/30 ML SOLN    Take 30 mLs (20 g total) by mouth once daily.    MELOXICAM (MOBIC) 15 MG TABLET    Take 1 tablet (15 mg total) by mouth once daily. Take 1 tab daily for 14 days, then daily as needed.    METHOCARBAMOL (ROBAXIN) 500 MG TAB    Take 1 tablet (500 mg total) by mouth 4 (four) times daily as needed (muscle spasms).    METHOCARBAMOL (ROBAXIN) 500 MG TAB    Take 1 tablet (500 mg total) by mouth 4 (four) times daily as needed (pain).    PANTOPRAZOLE (PROTONIX) 40 MG TABLET    TAKE ONE TABLET BY MOUTH ONE TIME DAILY    SOFOSBUVIR-VELPATASVIR 400-100 MG TAB    Take 1 tablet by mouth once daily.    SPIRONOLACTONE (ALDACTONE) 50 MG TABLET    TAKE 2 TABLETS BY MOUTH ONCE DAILY     Review of patient's allergies indicates:  No Known Allergies    Physical Exam:   Body mass index is 28.41 kg/m².    GENERAL: Well appearing, in no acute distress.  HEAD: Normocephalic and atraumatic.  ENT: External ears and nose grossly normal.  EYES: EOMI bilaterally  PULMONARY: Respirations are grossly even and non-labored.  NEURO: Awake, alert, and oriented x 3.  SKIN: No obvious rashes  appreciated.  PSYCH: Mood & affect are appropriate.    Detailed MSK exam:     Left shoulder exam:   -ROM: abduction 110, forward flexion 100, external rotation 80, internal rotation 60  -empty can test positive, resisted ER pain but no weakness, belly press pain but no weakness  -moralez test negative, neers test negative, whipple test positive  -biceps load test negative, yerguson test negative, Bottineau's test negative  -sensation intact, pulses 2+  -TTP: bicipital groove    Right shoulder exam:   -ROM: abduction 130, forward flexion 130, external rotation 80, internal rotation 70  -empty can test negative, resisted ER negative, belly press negative  -moralez test negative, neers test negative, whipple test negative  -biceps load test negative, yerguson test negative, Bottineau's test negative  -sensation intact, pulses 2+  -TTP: none      Imaging:  MRI Shoulder Without Contrast Left  Narrative: EXAM:  MRI SHOULDER WITHOUT CONTRAST LEFT    CLINICAL INDICATION: Unspecified rotator cuff tear or rupture of left shoulder, nonspecified as traumatic.    TECHNIQUE: MR left shoulder performed with multiplanar multisequence imaging.    COMPARISON STUDY:  None.    FINDINGS: There is mild subscapularis and supraspinatus tendinosis with low-grade articular sided tear posterior supraspinatus tendon footprint.  The rotator cuff is otherwise unremarkable.    Long head biceps tendon is intact with moderate intra-articular tendinosis and biceps tendon sheath fluid distention, question tendinitis.  No glenohumeral joint effusion.    Mild degenerative signal BLC and superior labrum.  Negative for labral tear.  Generalized glenohumeral chondral thinning.  No spurring.  Normal IGHL.    Type II acromion without tilting.  Mild AC joint arthropathy with spurring and capsular thickening.  There is a subacromial subdeltoid bursa with a small bursal fluid collection.    The bone marrow signal intensity is normal.  The signal intensity of the  muscle groups is also normal.  Impression: 1.  Mild supraspinatus tendinosis with low-grade articular sided tear posterior tendon footprint.  Mild subscapularis tendinosis.  2.  Moderate intra-articular long head biceps tendinosis with tendon sheath fluid distention, question tendinitis.  3.  Mild degenerative signal BLC, superior labrum.  Negative for labral tear.  4.  Mild AC joint arthropathy  5.  Subacromial subdeltoid bursitis with small bursal fluid collection.    Finalized on: 3/6/2024 7:46 AM By:  Tejas Kapoor MD  [facility]# [id number]      2024-03-06 07:48:[id number]    [facility]        Relevant imaging results were reviewed and interpreted by me and per my read shows mild AC arthritic changes.  This was discussed with the patient and / or family today.     Assessment:  Madhav Ruiz is a 58 y.o. male following up for chronic left shoulder pain and MRI review.   MRI showed partial RC tear, tendinopathy and bursitis.   Plan: Steroid injection given today (see separate procedure note for details). We discussed the proper protocols after the injection such as no submerging pools, baths tubs, or hot tubs for 24 hr.  Showering is okay today.  We also discussed that blood sugars can be elevated after an injection and asked patient to properly checked her sugars over the next few days and contact their PCP if there are any concerns.  We discussed red flags such as fevers, chills, red, warm, tender joint at the area of injection to please seek medical care immediately.   PT referral. Continue conservative management for pain.   Follow up 3 months. All questions answered.      Incomplete tear of left rotator cuff, unspecified whether traumatic  -     Large Joint Aspiration/Injection: L subacromial bursa    Tendinopathy of left rotator cuff  -     Ambulatory referral/consult to Physical/Occupational Therapy; Future; Expected date: 03/13/2024  -     Large Joint Aspiration/Injection: L subacromial bursa    Tendinopathy of left biceps  tendon    Left shoulder pain, unspecified chronicity  -     Sports Medicine US - Guidance for Needle Placement  -     Ambulatory referral/consult to Physical/Occupational Therapy; Future; Expected date: 03/13/2024         Ultrasound guidance was used for needle localization. Images were saved and stored for documentation. The appropriate structures were visualized. Dynamic visualization of the needle was continuous throughout the procedures and maintained good position.       A copy of today's visit note has been sent to the referring provider.     Electronically signed:  Noah Rae MD, MPH  03/06/2024  1:45 PM

## 2024-03-06 NOTE — PROCEDURES
Large Joint Aspiration/Injection: L subacromial bursa    Date/Time: 3/6/2024 2:20 PM    Performed by: Noah Rae MD  Authorized by: Noah Rae MD    Consent Done?:  Yes (Verbal)  Indications:  Pain  Site marked: the procedure site was marked    Timeout: prior to procedure the correct patient, procedure, and site was verified    Prep: patient was prepped and draped in usual sterile fashion    Local anesthetic:  Bupivacaine 0.5% without epinephrine and lidocaine 1% without epinephrine    Details:  Needle Size:  21 G  Ultrasonic Guidance for needle placement?: Yes    Images are saved and documented.  Approach:  Lateral  Location:  Shoulder  Site:  L subacromial bursa  Medications:  40 mg triamcinolone acetonide 40 mg/mL  Patient tolerance:  Patient tolerated the procedure well with no immediate complications     Ultrasound guidance was used for needle localization. Images were saved and stored for documentation. The appropriate structures were visualized. Dynamic visualization of the needle was continuous throughout the procedures and maintained good position.

## 2024-03-07 ENCOUNTER — CLINICAL SUPPORT (OUTPATIENT)
Dept: REHABILITATION | Facility: HOSPITAL | Age: 59
End: 2024-03-07
Attending: STUDENT IN AN ORGANIZED HEALTH CARE EDUCATION/TRAINING PROGRAM
Payer: MEDICAID

## 2024-03-07 DIAGNOSIS — M25.512 ACUTE PAIN OF LEFT SHOULDER: Primary | ICD-10-CM

## 2024-03-07 DIAGNOSIS — R53.1 DECREASED STRENGTH, ENDURANCE, AND MOBILITY: ICD-10-CM

## 2024-03-07 DIAGNOSIS — R68.89 DECREASED STRENGTH, ENDURANCE, AND MOBILITY: ICD-10-CM

## 2024-03-07 DIAGNOSIS — M67.912 TENDINOPATHY OF LEFT ROTATOR CUFF: ICD-10-CM

## 2024-03-07 DIAGNOSIS — R29.3 POSTURE ABNORMALITY: ICD-10-CM

## 2024-03-07 DIAGNOSIS — M25.512 LEFT SHOULDER PAIN, UNSPECIFIED CHRONICITY: ICD-10-CM

## 2024-03-07 DIAGNOSIS — Z74.09 DECREASED STRENGTH, ENDURANCE, AND MOBILITY: ICD-10-CM

## 2024-03-07 PROCEDURE — 97162 PT EVAL MOD COMPLEX 30 MIN: CPT

## 2024-03-07 NOTE — PLAN OF CARE
"[facility]   Physical Therapy Initial Evaluation     Date: 3/7/2024   Name: Madhav Ruiz  [facility] Number: [id number]    Therapy Diagnosis:   Encounter Diagnoses   Name Primary?    Left shoulder pain, unspecified chronicity     Tendinopathy of left rotator cuff      Physician: Noah Rae MD    Physician Orders: PT Eval and Treat   Medical Diagnosis from Referral:   Left shoulder pain, unspecified chronicity   Tendinopathy of left rotator cuff   Evaluation Date: 3/7/2024  Authorization Period Expiration: 3/6/2025  Plan of Care Expiration: 6/7/2024  Progress Note Due: 4/7/2024  Visit # / Visits authorized: 1/ 1   FOTO: 1/ 3     Precautions: Standard and Hep C diagnosis    Time In: 1430  Time Out: 1530  Total Appointment Time (timed & untimed codes): 60 minutes    SUBJECTIVE   Date of onset: 2024    History of current condition - MADHAV reports: that he experiences pain at the left shoulder.  He has experienced pain since 2020.  He reports of numbness into the distal brachium and forearm.  He also reports of significant weakness at the left shoulder.  He underwent first corticosteriod injection into the left glenoid yesterday.  He reports of increased pain today.  Pain with donning shirt and jacket.  Pain with grabbing orange juice out the refridgerator.  All overhead activities cause increased pain at the left shoulder. The patient is left-handed and does have an athletic background.  He also reports that he last played basketball a few months ago which exacerbated current shoulder pain    Falls: None    Imaging, X-ray left shoulder:    "FINDINGS: There is mild subscapularis and supraspinatus tendinosis with low-grade articular sided tear posterior supraspinatus tendon footprint.  The rotator cuff is otherwise unremarkable.     Long head biceps tendon is intact with moderate intra-articular tendinosis and biceps tendon sheath fluid distention, question tendinitis.  No glenohumeral joint " "effusion.     Mild degenerative signal BLC and superior labrum.  Negative for labral tear.  Generalized glenohumeral chondral thinning.  No spurring.  Normal IGHL.     Type II acromion without tilting.  Mild AC joint arthropathy with spurring and capsular thickening.  There is a subacromial subdeltoid bursa with a small bursal fluid collection.     The bone marrow signal intensity is normal.  The signal intensity of the muscle groups is also normal."    Prior Therapy: Has received PT for knee problems previously  Social History: Lives with wife  Occupation: unemployed  Prior Level of Function: Independent but did report of mild left shoulder pain  Current Level of Function: Pain with simple daily activities such as donning shirt, jacket, placing items such as orange juice on refridgerator shelf, overhead lifting, and sleeping    Pain:  Current 7/10, worst 10/10, best 3/10   Location: left shoulder/deltoid region  Description: Aching, Burning, Throbbing, Grabbing, and Sharp  Aggravating Factors: Laying and Lifting  Easing Factors: nothing    Patients goals: Decreased left shoulder pain     Medical History:   Past Medical History:   Diagnosis Date    Acid reflux     Edema of left lower extremity     Left knee    History of alcohol abuse        Surgical History:   Madhav Ruiz  has a past surgical history that includes head procedure and Esophagogastroduodenoscopy (N/A, 10/4/2023).    Medications:   Madhav has a current medication list which includes the following prescription(s): diclofenac sodium, fluarix quad 4240-2807 (pf), furosemide, gabapentin, lactulose, meloxicam, methocarbamol, methocarbamol, pantoprazole, sofosbuvir-velpatasvir, and spironolactone.    Allergies:   Review of patient's allergies indicates:  No Known Allergies       OBJECTIVE     CMS Impairment/Limitation/Restriction for FOTO Orthopedic Shoulder Survey    Therapist reviewed FOTO scores for Madhav Ruiz on 3/7/2024.   FOTO documents entered into " EPIC - see Media section.             Observation: patient is an ectomorphic body type male whom walked into the clinic without AE/AD    Posture: Pt with elevated right scapula, bilateral AC joint elevation/overdevelopment, anterior migration of the humerus in the glenoid.         CERVICAL  (single inclinometer at top of head) AROM  (degrees/%) Pain/Dysfunction with Movement   Flexion 31    Extension 31    Right side bending 31    Left side bending 21    Right rotation 60    Left rotation 52        ROM:  Shoulder  AROM/PROM Left Pain/Dysfunction with Movement   flexion  60/135 Pain at the left proximal humerus   extension  61/NT    abduction  131/146 Pain when lowering the arm   Internal rotation (AROM at 0 deg abd/PROM at 90 deg abd)  stomach/36 Pain shoulder   ER  48 deg /68 Pain anterior shoulder   Functional IR L1        Strength:  U/E MMT Left Pain/Dysfunction with Movement   Shoulder Flexion 3+/5 pain   Shoulder Abduction 4-/5 pain   Elbow Flexion (C5)  5/5    Elbow Extension (C7) 5/5    Shoulder ER  @ 0* Abduction 4-/5 pain   Shoulder IR  @ 0* Abduction 4/5    Wrist extension (C6) 5/5     Strength  NT    Rhomboids NT    Mid Traps NT    Low Traps NT      Sensation: Intact to light touch bilateral UE's, all dermatomes.    Reflexes:  Triceps diminished  Brachialis absent  Biceps absent    Special Test:  Cabrera +       Speeds +  Active Impingement pain      Joint Mobility: posterior glide hypomobility with mild pain    Upper Limb Tension Test:   Median NT  Radial NT  Ulnar NT  Top Tier   Cervical Flexion Dysfunctional - Painful   Cervical Extension Dysfunctional - Painful   Cervical Rotation Dysfunctional - Painful   Upper Extremity One (Medial Rotation) Dysfunctional - Painful   Upper Extremity Two (External Rotation) Dysfunctional - Painful          Palpation:  Patient tender with increased tone over bilateral  upper trapezial muscle, levator scapula, latissimus dorsi, teres minor, posterior deltoid,  infraspinatus, subscapularis, anterior  chest muscle, posterior cervical muscle, sternocleidomastoid, AC joint, suboccipital muscle, medial/lateral scapular muscle's.       TREATMENT     Total Treatment time (time-based codes) separate from Evaluation: 5 minutes      MADHAV received the treatments listed below:      THERAPEUTIC EXERCISES to develop strength, endurance, ROM, flexibility, posture, and core stabilization for (15) minutes including:    Intervention Performed Today    Nustep with arm use     Pulley  Flexion  Abduction   Right sidelying, left shoulder ER  No weights x 1m  x 2   Right sidelying, left shoulder flexion on table/bolster (weight supported)  x1m   Right sidelying, left shoulder abduction  x1m   Scapular movements  Retraction - 10 second holds x 2m  Depression - 10 second holds x 2m   manual  Right sidelying, left shoulder flexion with upward rotation of scapula, protraction/retraction of scapula, elevation/depression of scapula, soft tissue mobilization of deltoid, subscap, pec, infra and supraspinatus within tolerance          Plan for Next Visit:         PATIENT EDUCATION AND HOME EXERCISES     Education provided:   - Home exercises of sidelying shoulder flexion, ER, and abduction    Written Home Exercises Provided: No HEP provided.  Will issue on second visit. Exercises were reviewed and MADHAV was able to demonstrate them prior to the end of the session.  MADHAV demonstrated fair  understanding of the education provided. See EMR under Patient Instructions for exercises provided during therapy sessions.    ASSESSMENT     Madhav is a 58 y.o. male referred to outpatient Physical Therapy with a medical diagnosis of   Left shoulder pain, unspecified chronicity   Tendinopathy of left rotator cuff   The patient presents with signs and symptoms consistent with diagnosis along with pain at the left shoulder, decreased ROM left shoulder, weakness left shoulder, postural deficits at bilateral scapula,  cervical spine, and thoracic spine, pain with donning clothing, pain with overhead activities, pain with loading the shoulder musculature for simple activities like picking up orange juice container, and pain with sleeping activities.    Pt prognosis is Fair, if patient is consistent and compliant with PT and HEP .   Pt will benefit from skilled outpatient Physical Therapy to address the deficits stated above and in the chart below, provide pt/family education, and to maximize pt's level of independence.     Plan of care discussed with patient: Yes  Pt's spiritual, cultural and educational needs considered and patient is agreeable to the plan of care and goals as stated below:     Anticipated Barriers for therapy: Arthritis    Medical Necessity is demonstrated by the following  History  Co-morbidities and personal factors that may impact the plan of care Co-morbidities:   Arthritis    Personal Factors:   education level     moderate   Examination  Body Structures and Functions, activity limitations and participation restrictions that may impact the plan of care Body Regions:   neck  upper extremities    Body Systems:    ROM  strength  joint mobility, muscle tone, muscle length    Participation Restrictions:   See current level of function listed above     Activity limitations:   Learning and applying knowledge  no deficits    General Tasks and Commands  no deficits    Communication  no deficits    Mobility  lifting and carrying objects  driving (bike, car, motorcycle)    Self care  washing oneself (bathing, drying, washing hands)  caring for body parts (brushing teeth, shaving, grooming)  dressing    Domestic Life  shopping  cooking  doing house work (cleaning house, washing dishes, laundry)    Interactions/Relationships  no deficits    Life Areas  no deficits    Community and Social Life  community life  recreation and leisure         moderate   Clinical Presentation evolving clinical presentation with changing  clinical characteristics moderate   Decision Making/ Complexity Score: moderate     Goals:  Short Term Goals: In 4 weeks   1.Patient  to be educated on HEP.  2.Patient to increase shoulder PROM to 160 deg flexion and abduction, ER 80 deg at 90 deg abd , in order to improve function of involved UE.  3.Patient to increase strength at shoulder flexion, abduction, ER, and IR to 4-/5, in order to improve endurance with activity.  4.Patient to have pain less than 2/10 at worst, in order to improve QOL.  5.Patient to improve score on the FOTO, in order to improve QOL.   6. Patient  to be educated on postural and body mechanics awareness.    Long Term Goals: In 10 weeks  1. Patient to improve score on the FOTO to 60% or less, in order to improve QOL.  2. Patient to demonstrate increase in UE strength to 4/5, in order to improve endurance with activity.  3. Patient to have decreased pain to 2/10 at worst, in order to improve QOL.  4. Patient to demonstrate increased shoulder AROM to shoulder flexion and abduction to 140 deg, ER 70 deg and 90 deg abd, and functional IR to T5, in order to improve function of involved UE.  5. Patient to perform daily activities without increased symptoms including:  Sleeping  Overhead lifting to place objects in cupboard  Lifting orange juice, milk, and/or water containers onto a shelf  Tristan clothing without significant pain      PLAN     Plan of care Certification: 3/7/2024 to 6/7/2024.    Outpatient Physical Therapy 2 times weekly for 8 weeks to include the following interventions: Manual Therapy, Neuromuscular Re-ed, Patient Education, Self Care, Therapeutic Activities, and Therapeutic Exercise.     Zak Briceno, PT      I CERTIFY THE NEED FOR THESE SERVICES FURNISHED UNDER THIS PLAN OF TREATMENT AND WHILE UNDER MY CARE   Physician's comments:     Physician's Signature: ___________________________________________________

## 2024-03-08 ENCOUNTER — CLINICAL SUPPORT (OUTPATIENT)
Dept: REHABILITATION | Facility: HOSPITAL | Age: 59
End: 2024-03-08
Payer: MEDICAID

## 2024-03-08 DIAGNOSIS — R53.1 DECREASED STRENGTH, ENDURANCE, AND MOBILITY: ICD-10-CM

## 2024-03-08 DIAGNOSIS — M25.512 ACUTE PAIN OF LEFT SHOULDER: Primary | ICD-10-CM

## 2024-03-08 DIAGNOSIS — Z74.09 DECREASED STRENGTH, ENDURANCE, AND MOBILITY: ICD-10-CM

## 2024-03-08 DIAGNOSIS — R68.89 DECREASED STRENGTH, ENDURANCE, AND MOBILITY: ICD-10-CM

## 2024-03-08 DIAGNOSIS — R29.3 POSTURE ABNORMALITY: ICD-10-CM

## 2024-03-08 PROCEDURE — 97110 THERAPEUTIC EXERCISES: CPT | Mod: CQ

## 2024-03-08 NOTE — PROGRESS NOTES
[facility]   Physical Therapy Treatment Note        Name: Madhav Ruiz  Clinic Number: [id number]    Therapy Diagnosis:   Encounter Diagnoses   Name Primary?    Acute pain of left shoulder Yes    Decreased strength, endurance, and mobility     Posture abnormality      Physician: Noah Rae MD    Visit Date: 3/8/2024      Physician Orders: PT Eval and Treat   Medical Diagnosis from Referral:   Left shoulder pain, unspecified chronicity   Tendinopathy of left rotator cuff   Evaluation Date: 3/7/2024  Authorization Period Expiration: 3/6/2025  Plan of Care Expiration: 6/7/2024  Progress Note Due: 4/7/2024  Visit # / Visits authorized: 1/ 1   FOTO: 1/ 3      Precautions: Standard and Hep C diagnosis  PTA Visit #: 1/5     Time In: 1617 (late arrival due to traffic and traffic lights out)  Time Out: 1705  Total Billable Time: 45 minutes (Billing reflects 1 on 1 treatment time spent with patient)    Subjective     Patient reports: less shoulder pain since injection. Has no strength in his left upper extremity (unable to lift computer keyboard)     HeN/A compliant with home exercise program.    Response to previous treatment: felt okay immediately after evaluation but later that evening, he began to notice his left shoulder was more stiff.     Functional change: overhead reach, difficult dressing upper extremity.     Pain: 7/10     Location: left shoulder    Objective      Objective Measures updated at progress report or POC update only unless otherwise noted.       Treatment     MADHAV received the treatments listed below:     MANUAL THERAPY TECHNIQUES were applied for (12) minutes, including:    Manual Intervention Performed Today    Soft Tissue Mobilization [x] left pectorals, rotator cuff muscles, teres major and minor , subscapularis     Joint Mobilizations []     []     []    Functional Dry Needling  []      Plan for Next Visit: Continue as needed       THERAPEUTIC EXERCISES to develop  strength, endurance, ROM, flexibility, posture, and core stabilization for (33) minutes including:    Intervention Performed Today    nustep     UBE x 2 minutes forward/backward    pulley x 3 minutes flexion   External rotation left upper extremity   Abduction left upper extremity  x 3 x 10 Side lying towel under upper extremity   X 10 Side lying 5 seconds hold     Table slide left upper extremity  X  x X 10 Flexion 5 seconds hold    X 10 abduction 5 seconds hold    Scapula retraction x X 20 sitting    Scapula depression x X 20 sitting          Plan for Next Visit:          Patient Education and Home Exercises       Home Exercises Provided and Patient Education Provided     Education provided: (during session) minutes  PURPOSE: Patient educated on the impairments noted above and the effects of physical therapy intervention to improve overall condition and QOL.   EXERCISE: Patient was educated on all the above exercise prior/during/after for proper posture, positioning, and execution for safe performance with home exercise program.   STRENGTH: Patient educated on the importance of improved core and extremity strength in order to improve alignment of the spine and extremities with static positions and dynamic movement.   [id number]: added home exercise program to my chart and gave patient paper copy and educated on how to access home exercise program from mobile device.     Written Home Exercises Provided: yes.  Exercises were reviewed and EMMA was able to demonstrate them prior to the end of the session.  EMMA demonstrated good  understanding of the education provided. See EMR under Patient Instructions for exercises provided during therapy sessions.    Assessment     Patient with good scapula depression quality after initial instructions. Patient with forward head and rounded upper back sitting posture which may be contributing to his shoulder pain.      EMMA is progressing well towards his goals.   Patient prognosis is  Fair.     Patient will continue to benefit from skilled outpatient physical therapy to address the deficits listed in the problem list box on initial evaluation, provide pt/family education and to maximize patient's level of independence in the home and community environment.     Patient's spiritual, cultural and educational needs considered and pt agreeable to plan of care and goals.       Anticipated Barriers for therapy: Arthritis       Goals:  Short Term Goals: In 4 weeks   1.Patient  to be educated on HEP.  2.Patient to increase shoulder PROM to 160 deg flexion and abduction, ER 80 deg at 90 deg abd , in order to improve function of involved UE.  3.Patient to increase strength at shoulder flexion, abduction, ER, and IR to 4-/5, in order to improve endurance with activity.  4.Patient to have pain less than 2/10 at worst, in order to improve QOL.  5.Patient to improve score on the FOTO, in order to improve QOL.   6. Patient  to be educated on postural and body mechanics awareness.     Long Term Goals: In 10 weeks  1. Patient to improve score on the FOTO to 60% or less, in order to improve QOL.  2. Patient to demonstrate increase in UE strength to 4/5, in order to improve endurance with activity.  3. Patient to have decreased pain to 2/10 at worst, in order to improve QOL.  4. Patient to demonstrate increased shoulder AROM to shoulder flexion and abduction to 140 deg, ER 70 deg and 90 deg abd, and functional IR to T5, in order to improve function of involved UE.  5. Patient to perform daily activities without increased symptoms including:  Sleeping  Overhead lifting to place objects in cupboard  Lifting orange juice, milk, and/or water containers onto a shelf  Tristan clothing without significant pain            Plan     Continue Plan of Care (POC) and progress per patient tolerance. See treatment section for details on planned progressions next session.      Noah Lan, PTA

## 2024-03-19 ENCOUNTER — TELEPHONE (OUTPATIENT)
Dept: ORTHOPEDICS | Facility: CLINIC | Age: 59
End: 2024-03-19
Payer: MEDICAID

## 2024-03-19 NOTE — TELEPHONE ENCOUNTER
----- Message from Keena Phillips sent at 3/19/2024  8:32 AM CDT -----  Patient's wife is requesting a call back regarding his upcoming appointment. Call back number is [phone]. Thx.EL

## 2024-03-22 ENCOUNTER — TELEPHONE (OUTPATIENT)
Dept: HEPATOLOGY | Facility: CLINIC | Age: 59
End: 2024-03-22
Payer: MEDICAID

## 2024-03-22 NOTE — TELEPHONE ENCOUNTER
Attempt made to reach patient's wife for scheduling.  I left a VM asking that she call hepatology back.  I also sent a letter.

## 2024-03-22 NOTE — TELEPHONE ENCOUNTER
Today's visit cancelled b/c pt not home  Per wife, pt doing well. Home from rehab. Not drinking.  Still on epclusa    Pls reschedule visit.    thx

## 2024-04-11 ENCOUNTER — TELEPHONE (OUTPATIENT)
Dept: HEPATOLOGY | Facility: CLINIC | Age: 59
End: 2024-04-11
Payer: MEDICAID

## 2024-04-11 NOTE — TELEPHONE ENCOUNTER
I spoke with patient's wife 2 hours before scheduled appt with PA Scheuermann and she confirmed visit with provider on today.  Patient was a no-show for virtual appt.  I sent a letter asking that they contact hepatology again for rescheduling.   room air

## 2024-04-29 ENCOUNTER — TELEPHONE (OUTPATIENT)
Dept: ORTHOPEDICS | Facility: CLINIC | Age: 59
End: 2024-04-29
Payer: MEDICAID

## 2024-04-29 DIAGNOSIS — M79.642 LEFT HAND PAIN: Primary | ICD-10-CM

## 2024-04-29 NOTE — TELEPHONE ENCOUNTER
Called patient and spoke with wife regarding injury.  States that  broke hand in [place] over the weekend and was instructed by ER to see hand specialist.  Scheduled appt with Dr. Cox with new xrays.  Wife verbalized understanding of appt date, time and location.

## 2024-05-17 ENCOUNTER — TELEPHONE (OUTPATIENT)
Dept: ORTHOPEDICS | Facility: CLINIC | Age: 59
End: 2024-05-17
Payer: MEDICAID

## 2024-05-17 NOTE — TELEPHONE ENCOUNTER
----- Message from Geovani Villa sent at 5/17/2024  2:42 PM CDT -----  Contact: wife  Pt wife is asking for an return call in reference to scheduling an apt for his left hand fracture, please call back at ..[phone] Thx CJ

## 2024-05-20 ENCOUNTER — OFFICE VISIT (OUTPATIENT)
Dept: ORTHOPEDICS | Facility: CLINIC | Age: 59
End: 2024-05-20
Payer: MEDICAID

## 2024-05-20 VITALS — WEIGHT: 198 LBS | BODY MASS INDEX: 26.24 KG/M2 | HEIGHT: 73 IN

## 2024-05-20 DIAGNOSIS — M17.11 ARTHRITIS OF KNEE, RIGHT: Primary | ICD-10-CM

## 2024-05-20 DIAGNOSIS — M21.161 ACQUIRED VARUS DEFORMITY KNEE, RIGHT: ICD-10-CM

## 2024-05-20 DIAGNOSIS — M21.162 ACQUIRED VARUS DEFORMITY KNEE, LEFT: ICD-10-CM

## 2024-05-20 DIAGNOSIS — M17.12 ARTHRITIS OF KNEE, LEFT: ICD-10-CM

## 2024-05-20 PROCEDURE — 99999 PR PBB SHADOW E&M-EST. PATIENT-LVL III: CPT | Mod: PBBFAC,,, | Performed by: ORTHOPAEDIC SURGERY

## 2024-05-20 PROCEDURE — 99213 OFFICE O/P EST LOW 20 MIN: CPT | Mod: PBBFAC,25 | Performed by: ORTHOPAEDIC SURGERY

## 2024-05-20 PROCEDURE — 99214 OFFICE O/P EST MOD 30 MIN: CPT | Mod: 25,S$PBB,, | Performed by: ORTHOPAEDIC SURGERY

## 2024-05-20 PROCEDURE — 3008F BODY MASS INDEX DOCD: CPT | Mod: CPTII,,, | Performed by: ORTHOPAEDIC SURGERY

## 2024-05-20 PROCEDURE — 99999PBSHW PR PBB SHADOW TECHNICAL ONLY FILED TO HB: Mod: PBBFAC,,,

## 2024-05-20 PROCEDURE — 1159F MED LIST DOCD IN RCRD: CPT | Mod: CPTII,,, | Performed by: ORTHOPAEDIC SURGERY

## 2024-05-20 PROCEDURE — 20610 DRAIN/INJ JOINT/BURSA W/O US: CPT | Mod: 50,PBBFAC | Performed by: ORTHOPAEDIC SURGERY

## 2024-05-20 RX ORDER — METHYLPREDNISOLONE ACETATE 80 MG/ML
80 INJECTION, SUSPENSION INTRA-ARTICULAR; INTRALESIONAL; INTRAMUSCULAR; SOFT TISSUE
Status: DISCONTINUED | OUTPATIENT
Start: 2024-05-20 | End: 2024-05-20 | Stop reason: HOSPADM

## 2024-05-20 RX ORDER — SULINDAC 200 MG/1
200 TABLET ORAL 2 TIMES DAILY PRN
Qty: 60 TABLET | Refills: 3 | Status: SHIPPED | OUTPATIENT
Start: 2024-05-20 | End: 2024-05-21 | Stop reason: SDUPTHER

## 2024-05-20 RX ORDER — NALTREXONE HYDROCHLORIDE 50 MG/1
50 TABLET, FILM COATED ORAL EVERY MORNING
COMMUNITY
Start: 2024-01-23

## 2024-05-20 RX ORDER — TRAZODONE HYDROCHLORIDE 100 MG/1
100 TABLET ORAL NIGHTLY
COMMUNITY
Start: 2024-01-26

## 2024-05-20 RX ORDER — IBUPROFEN 600 MG/1
600 TABLET ORAL EVERY 6 HOURS
COMMUNITY
Start: 2024-04-28

## 2024-05-20 RX ADMIN — METHYLPREDNISOLONE ACETATE 80 MG: 80 INJECTION, SUSPENSION INTRALESIONAL; INTRAMUSCULAR; INTRASYNOVIAL; SOFT TISSUE at 04:05

## 2024-05-20 NOTE — PROGRESS NOTES
Subjective:     Patient ID: Madhav Ruiz is a 59 y.o. male.    Chief Complaint: Pain of the Right Knee and Pain of the Left Knee  01/23/2023   HPI:  57-year-old with bilateral knee pain since 2015 where he [place] the [place] and twisted his knees.  He has been suffering from his knee and been treated with numerous medications including meloxicam and Aleve and Tylenol.  This right now became severe over the last year that he could not w relief for at least a week or ork.  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  Now it has been so painful that he can not do much in his pain is 8/10.  He is  with his wife who does 2 jobs right now in order to provide.  He never had any braces.  He did go to physical therapy without too much relief.  At 1 point he did try gabapentin but he is not too sure if he took it long enough.  On 10/13/2022 received steroid injections into both of his knees and on November 14 he received viscosupplementation.  He said both injections seems to have caused more pain for at least 10 days.  He does not want have his injections repeated he can.  He is trying to see if any different medications could be given to help him out.  He does have severe cramps in the legs at night but not during the day and he drinks pickle juice  You decline having any pain in his back at this time or in the groin.  Has no fever no chills no shortness of breath or difficulty with chewing or swallowing loss of bowel bladder control blurry vision double vision loss sense smell or taste    5/20/24   Bilateral knee pain  Patient also complain of right shoulder pain secondary to injury 4/28/24 when [place] state.  He fell down and broke his hand on the left.  He was having shoulder pain on the left the he is under the care of Dr. Akhil mayorga.  I did tell him at this time I will get him an appointment with Dr. Akhil mayorga to assess his right shoulder for him.  He has an  appointment with Dr. Cox to be seen for the left hand fracture in a.m.  Last time he was here more than a year ago place him on gabapentin and nabumetone he said he was taking both of them were helping a little bit but not much and he ran out.  He never called for any renewal.  He is having quite a bit of pain in both of his knees he is pain is around 9/10 overall  I deemed them disabled however he had not gotten his disability at this point.  No fever no chills no shortness of breath difficulty with chewing or swallowing loss of bowel bladder control blurry vision double vision loss sense smell or taste  Past Medical History:   Diagnosis Date    Acid reflux     Edema of left lower extremity     Left knee    History of alcohol abuse      Past Surgical History:   Procedure Laterality Date    ESOPHAGOGASTRODUODENOSCOPY N/A 10/4/2023    Procedure: EGD (ESOPHAGOGASTRODUODENOSCOPY);  Surgeon: Polo Montaño MD;  Location: [facility] (2ND FLR);  Service: Gastroenterology;  Laterality: N/A;  referral: Jennifer Scheuermann, PA- possible banding / Hx varices past EGd at outside facility / Pt hx hematemesis- 2nd flr / prep ins on portal/ cirrhosis - labs - ERW  9/27-precall complete-MS    head procedure       No family history on file.  Social History     Socioeconomic History    Marital status:    Tobacco Use    Smoking status: Every Day     Current packs/day: 0.50     Types: Cigarettes    Smokeless tobacco: Never   Substance and Sexual Activity    Alcohol use: Yes     Alcohol/week: 20.0 standard drinks of alcohol     Types: 20 Shots of liquor per week    Drug use: Never    Sexual activity: Yes     Partners: Female     Social Determinants of Health     Financial Resource Strain: Low Risk  (3/22/2024)    Overall Financial Resource Strain (CARDIA)     Difficulty of Paying Living Expenses: Not hard at all   Food Insecurity: Food Insecurity Present (3/22/2024)    Hunger Vital Sign     Worried About Running Out of Food  in the Last Year: Never true     Ran Out of Food in the Last Year: Sometimes true   Transportation Needs: No Transportation Needs (3/22/2024)    PRAPARE - Transportation     Lack of Transportation (Medical): No     Lack of Transportation (Non-Medical): No   Housing Stability: High Risk (3/22/2024)    Housing Stability Vital Sign     Unable to Pay for Housing in the Last Year: Yes     Number of Places Lived in the Last Year: 2     Unstable Housing in the Last Year: Yes     Medication List with Changes/Refills   New Medications    SULINDAC (CLINORIL) 200 MG TAB    Take 1 tablet (200 mg total) by mouth 2 (two) times daily as needed (Pain).   Current Medications    DICLOFENAC SODIUM (VOLTAREN) 1 % GEL    Apply 2 g topically 2 (two) times daily.    FLU VACC [id number] 6MOS UP,PF, (FLUARIX QUAD 0143-0303, PF,) 60 MCG (15 MCG X 4)/0.5 ML SYRG    use as directed    FUROSEMIDE (LASIX) 20 MG TABLET    TAKE 2 TABLETS BY MOUTH ONCE DAILY    GABAPENTIN (NEURONTIN) 300 MG CAPSULE    Take 1 capsule (300 mg total) by mouth 2 (two) times daily.    IBUPROFEN (ADVIL,MOTRIN) 600 MG TABLET    Take 600 mg by mouth every 6 (six) hours.    LACTULOSE (CHRONULAC) 20 GRAM/30 ML SOLN    Take 30 mLs (20 g total) by mouth once daily.    MELOXICAM (MOBIC) 15 MG TABLET    Take 1 tablet (15 mg total) by mouth once daily. Take 1 tab daily for 14 days, then daily as needed.    METHOCARBAMOL (ROBAXIN) 500 MG TAB    Take 1 tablet (500 mg total) by mouth 4 (four) times daily as needed (muscle spasms).    NALTREXONE (DEPADE) 50 MG TABLET    Take 50 mg by mouth every morning.    PANTOPRAZOLE (PROTONIX) 40 MG TABLET    TAKE ONE TABLET BY MOUTH ONE TIME DAILY    SOFOSBUVIR-VELPATASVIR 400-100 MG TAB    Take 1 tablet by mouth once daily.    SPIRONOLACTONE (ALDACTONE) 50 MG TABLET    TAKE 2 TABLETS BY MOUTH ONCE DAILY    TRAZODONE (DESYREL) 100 MG TABLET    Take 100 mg by mouth every evening.     Review of patient's allergies indicates:  No Known  Allergies  Review of Systems   Constitutional: Negative for decreased appetite.   HENT:  Negative for tinnitus.    Eyes:  Negative for double vision.   Cardiovascular:  Negative for chest pain.   Respiratory:  Negative for wheezing.    Hematologic/Lymphatic: Negative for bleeding problem.   Skin:  Negative for dry skin.   Musculoskeletal:  Positive for arthritis, joint pain and muscle cramps. Negative for back pain, gout, neck pain and stiffness.   Gastrointestinal:  Negative for abdominal pain.   Genitourinary:  Negative for bladder incontinence.   Neurological:  Negative for numbness, paresthesias and sensory change.   Psychiatric/Behavioral:  Negative for altered mental status.        Objective:   Body mass index is 26.12 kg/m².  There were no vitals filed for this visit.       General    Constitutional: He is oriented to person, place, and time. He appears well-developed.   HENT:   Head: Atraumatic.   Eyes: EOM are normal.   Pulmonary/Chest: Effort normal.   Neurological: He is alert and oriented to person, place, and time.   Psychiatric: Judgment normal.           Ambulating with a limp but no assistive devices.  His wife with him   Pelvis is level  Bilateral hips passive motion without pain in the groin.  There is excellent motion.  Hip flexors and abductors and adductors were 5/5   Bilateral knees with varus deformity   Right knee with severe medial joint tenderness.  Range of motion 5-125.  Crepitus to compression on the medial side and patella.  There is mild to moderate swelling.  Collaterals and cruciates seems to be stable.  No defect in the patella or quadriceps tendon.  The quads and hamstrings were 5/5   Left knee with severe medial joint tenderness also range of motion 5-125.  Crepitus to compression on the medial and patella.  There is mild swelling.  Collaterals and cruciates stable.  No defect in the patella or quadriceps tendon.  His strength is 5/5 in quadriceps and hamstrings   Calves are soft  nontender with slight varicosities  Ankles without swelling  Ankle motion intact   Skin is warm to touch no obvious lesions  PT 1+    Relevant imaging results reviewed and interpreted by me, discussed with the patient and / or family today   X-ray 03/04/2024 both knees reviewed showing no fractures.  There is marginal osteophytes and loss of joint space medially on both of the knees.  X-ray 10/12/2022 bilateral knees with left knee all bone on bone medially with marginal osteophytes complete loss of joint space compared to the right knee which is moderately severe loss of medial joint space also some marginal osteophytes consistent with arthritis and varus deformity  Assessment:     Encounter Diagnoses   Name Primary?    Arthritis of knee, right Yes    Acquired varus deformity knee, right     Arthritis of knee, left     Acquired varus deformity knee, left         Plan:   Arthritis of knee, right  -     sulindac (CLINORIL) 200 MG Tab; Take 1 tablet (200 mg total) by mouth 2 (two) times daily as needed (Pain).  Dispense: 60 tablet; Refill: 3  -     Large Joint Aspiration/Injection: bilateral knee    Acquired varus deformity knee, right    Arthritis of knee, left  -     sulindac (CLINORIL) 200 MG Tab; Take 1 tablet (200 mg total) by mouth 2 (two) times daily as needed (Pain).  Dispense: 60 tablet; Refill: 3  -     Large Joint Aspiration/Injection: bilateral knee    Acquired varus deformity knee, left         Patient Instructions   X-rays in March 2024 showing that you have arthritis in both knees on the inside   You already tried year ago nabumetone and gabapentin did not seem to help except very little in you ran out   I think I want you to start sulindac 200 mg twice a day with food   Do not take a leave or Advil or Motrin or ibuprofen over-the-counter with the sulindac  I injected both of her knees each with 80 mg Depo-Medrol mixed with 5 cc 1% lidocaine  You had fallen in April 28  you fractured your left hand and  you have an appointment to see Dr. Cox in a.m.  You had MRI done on your shoulder by Dr. Guerra you need to follow-up with him because now your right shoulder is hurting as well as the left due to the fall that is sustained April 28  I will get your insurance to approve you for rooster comb gel injection   I will see you back in 3 months   Kellgren Sae scale 3.        Disclaimer: This note was prepared using a voice recognition system and is likely to have sound alike errors within the text.

## 2024-05-20 NOTE — PATIENT INSTRUCTIONS
X-rays in March 2024 showing that you have arthritis in both knees on the inside   You already tried year ago nabumetone and gabapentin did not seem to help except very little in you ran out   I think I want you to start sulindac 200 mg twice a day with food   Do not take a leave or Advil or Motrin or ibuprofen over-the-counter with the sulindac  I injected both of her knees each with 80 mg Depo-Medrol mixed with 5 cc 1% lidocaine  You had fallen in April 28  you fractured your left hand and you have an appointment to see Dr. Cox in a.m.  You had MRI done on your shoulder by Dr. Guerra you need to follow-up with him because now your right shoulder is hurting as well as the left due to the fall that is sustained April 28  I will get your insurance to approve you for rooster comb gel injection   I will see you back in 3 months   Kellgren Sae scale 3.

## 2024-05-20 NOTE — PROCEDURES
Large Joint Aspiration/Injection: bilateral knee    Date/Time: 5/20/2024 4:00 PM    Performed by: Enrique Cavazos MD  Authorized by: Enrique Cavazos MD    Consent Done?:  Yes (Verbal)  Indications:  Arthritis  Site marked: the procedure site was marked    Timeout: prior to procedure the correct patient, procedure, and site was verified      Local anesthesia used?: Yes    Local anesthetic:  Lidocaine 1% without epinephrine    Details:  Needle Size:  22 G  Ultrasonic Guidance for needle placement?: No    Approach:  Anterolateral  Location:  Knee  Laterality:  Bilateral  Site:  Bilateral knee  Medications (Right):  80 mg methylPREDNISolone acetate 80 mg/mL  Medications (Left):  80 mg methylPREDNISolone acetate 80 mg/mL  Patient tolerance:  Patient tolerated the procedure well with no immediate complications

## 2024-05-21 ENCOUNTER — OFFICE VISIT (OUTPATIENT)
Dept: ORTHOPEDICS | Facility: CLINIC | Age: 59
End: 2024-05-21
Payer: MEDICAID

## 2024-05-21 ENCOUNTER — HOSPITAL ENCOUNTER (OUTPATIENT)
Dept: RADIOLOGY | Facility: HOSPITAL | Age: 59
Discharge: HOME | End: 2024-05-21
Attending: ORTHOPAEDIC SURGERY
Payer: MEDICAID

## 2024-05-21 ENCOUNTER — TELEPHONE (OUTPATIENT)
Dept: SPORTS MEDICINE | Facility: CLINIC | Age: 59
End: 2024-05-21
Payer: MEDICAID

## 2024-05-21 VITALS — HEIGHT: 73 IN | WEIGHT: 198 LBS | BODY MASS INDEX: 26.24 KG/M2

## 2024-05-21 DIAGNOSIS — S62.343A CLOSED NONDISPLACED FRACTURE OF BASE OF THIRD METACARPAL BONE OF LEFT HAND, INITIAL ENCOUNTER: Primary | ICD-10-CM

## 2024-05-21 DIAGNOSIS — M25.512 CHRONIC PAIN OF BOTH SHOULDERS: Primary | ICD-10-CM

## 2024-05-21 DIAGNOSIS — M79.642 LEFT HAND PAIN: ICD-10-CM

## 2024-05-21 DIAGNOSIS — M17.12 ARTHRITIS OF KNEE, LEFT: ICD-10-CM

## 2024-05-21 DIAGNOSIS — G89.29 CHRONIC PAIN OF BOTH SHOULDERS: Primary | ICD-10-CM

## 2024-05-21 DIAGNOSIS — M79.642 LEFT HAND PAIN: Primary | ICD-10-CM

## 2024-05-21 DIAGNOSIS — M25.511 CHRONIC PAIN OF BOTH SHOULDERS: Primary | ICD-10-CM

## 2024-05-21 DIAGNOSIS — M17.11 ARTHRITIS OF KNEE, RIGHT: ICD-10-CM

## 2024-05-21 PROCEDURE — 1159F MED LIST DOCD IN RCRD: CPT | Mod: CPTII,,, | Performed by: ORTHOPAEDIC SURGERY

## 2024-05-21 PROCEDURE — 1160F RVW MEDS BY RX/DR IN RCRD: CPT | Mod: CPTII,,, | Performed by: ORTHOPAEDIC SURGERY

## 2024-05-21 PROCEDURE — 3008F BODY MASS INDEX DOCD: CPT | Mod: CPTII,,, | Performed by: ORTHOPAEDIC SURGERY

## 2024-05-21 PROCEDURE — 99999 PR PBB SHADOW E&M-EST. PATIENT-LVL III: CPT | Mod: PBBFAC,,, | Performed by: ORTHOPAEDIC SURGERY

## 2024-05-21 PROCEDURE — 99203 OFFICE O/P NEW LOW 30 MIN: CPT | Mod: S$PBB,,, | Performed by: ORTHOPAEDIC SURGERY

## 2024-05-21 PROCEDURE — 99213 OFFICE O/P EST LOW 20 MIN: CPT | Mod: PBBFAC,25 | Performed by: ORTHOPAEDIC SURGERY

## 2024-05-21 PROCEDURE — 73130 X-RAY EXAM OF HAND: CPT | Mod: TC,LT

## 2024-05-21 PROCEDURE — 73130 X-RAY EXAM OF HAND: CPT | Mod: 26,LT,, | Performed by: RADIOLOGY

## 2024-05-21 RX ORDER — SULINDAC 200 MG/1
200 TABLET ORAL 2 TIMES DAILY PRN
Qty: 60 TABLET | Refills: 3 | Status: SHIPPED | OUTPATIENT
Start: 2024-05-21

## 2024-05-21 RX ORDER — ACETAMINOPHEN 325 MG/1
650 TABLET ORAL
Status: DISCONTINUED | OUTPATIENT
Start: 2024-05-21 | End: 2024-05-21

## 2024-05-21 NOTE — TELEPHONE ENCOUNTER
Called and spoke with patient's wife and scheduled appt with xray, due to recent fall for Wednesday, May 22 at 1:30 for xray and 2 pm for appt.  Explained to wife that it is still a few weeks early for patient to receive a CSI to the left shoulder but she stated that patient still wanted to be seen since his other shoulder is hurting since the fall.   ----- Message from Jennifer Macdonadl MA sent at 5/20/2024  4:56 PM CDT -----  Good Afternoon, Dr. Cavazos would like for this patient to be schedule for a follow up appointment for Bilat Shoulder pain. Patient would like to be seen sooner than June 5. I told him that someone will call. I could not schedule due to the fact that I do not have access.

## 2024-05-21 NOTE — PROGRESS NOTES
Subjective:     Patient ID: Madhav Ruiz is a 59 y.o. male.    Chief Complaint: Pain and Injury of the Left Hand      HPI:  The patient is a 59-year-old male who fell 04/28/2024 and injured his left hand.  He was seen in [place] and had a splint applied.    Past Medical History:   Diagnosis Date    Acid reflux     Edema of left lower extremity     Left knee    History of alcohol abuse      Past Surgical History:   Procedure Laterality Date    ESOPHAGOGASTRODUODENOSCOPY N/A 10/4/2023    Procedure: EGD (ESOPHAGOGASTRODUODENOSCOPY);  Surgeon: Polo Montaño MD;  Location: [facility] (2ND FLR);  Service: Gastroenterology;  Laterality: N/A;  referral: Jennifer Scheuermann, PA- possible banding / Hx varices past EGd at outside facility / Pt hx hematemesis- 2nd flr / prep ins on portal/ cirrhosis - labs - ERW  9/27-precall complete-MS    head procedure       No family history on file.  Social History     Socioeconomic History    Marital status:    Tobacco Use    Smoking status: Every Day     Current packs/day: 0.50     Types: Cigarettes    Smokeless tobacco: Never   Substance and Sexual Activity    Alcohol use: Yes     Alcohol/week: 20.0 standard drinks of alcohol     Types: 20 Shots of liquor per week    Drug use: Never    Sexual activity: Yes     Partners: Female     Social Determinants of Health     Financial Resource Strain: Low Risk  (3/22/2024)    Overall Financial Resource Strain (CARDIA)     Difficulty of Paying Living Expenses: Not hard at all   Food Insecurity: Food Insecurity Present (3/22/2024)    Hunger Vital Sign     Worried About Running Out of Food in the Last Year: Never true     Ran Out of Food in the Last Year: Sometimes true   Transportation Needs: No Transportation Needs (3/22/2024)    PRAPARE - Transportation     Lack of Transportation (Medical): No     Lack of Transportation (Non-Medical): No   Housing Stability: High Risk (3/22/2024)    Housing Stability Vital Sign     Unable to Pay for Housing  in the Last Year: Yes     Number of Places Lived in the Last Year: 2     Unstable Housing in the Last Year: Yes     Medication List with Changes/Refills   Current Medications    DICLOFENAC SODIUM (VOLTAREN) 1 % GEL    Apply 2 g topically 2 (two) times daily.    FLU VACC [id number] 6MOS UP,PF, (FLUARIX QUAD 1924-7888, PF,) 60 MCG (15 MCG X 4)/0.5 ML SYRG    use as directed    FUROSEMIDE (LASIX) 20 MG TABLET    TAKE 2 TABLETS BY MOUTH ONCE DAILY    GABAPENTIN (NEURONTIN) 300 MG CAPSULE    Take 1 capsule (300 mg total) by mouth 2 (two) times daily.    IBUPROFEN (ADVIL,MOTRIN) 600 MG TABLET    Take 600 mg by mouth every 6 (six) hours.    LACTULOSE (CHRONULAC) 20 GRAM/30 ML SOLN    Take 30 mLs (20 g total) by mouth once daily.    MELOXICAM (MOBIC) 15 MG TABLET    Take 1 tablet (15 mg total) by mouth once daily. Take 1 tab daily for 14 days, then daily as needed.    METHOCARBAMOL (ROBAXIN) 500 MG TAB    Take 1 tablet (500 mg total) by mouth 4 (four) times daily as needed (muscle spasms).    NALTREXONE (DEPADE) 50 MG TABLET    Take 50 mg by mouth every morning.    PANTOPRAZOLE (PROTONIX) 40 MG TABLET    TAKE ONE TABLET BY MOUTH ONE TIME DAILY    SOFOSBUVIR-VELPATASVIR 400-100 MG TAB    Take 1 tablet by mouth once daily.    SPIRONOLACTONE (ALDACTONE) 50 MG TABLET    TAKE 2 TABLETS BY MOUTH ONCE DAILY    SULINDAC (CLINORIL) 200 MG TAB    Take 1 tablet (200 mg total) by mouth 2 (two) times daily as needed (Pain).    TRAZODONE (DESYREL) 100 MG TABLET    Take 100 mg by mouth every evening.     Review of patient's allergies indicates:  No Known Allergies  Review of Systems   Constitutional: Negative for malaise/fatigue.   HENT:  Negative for hearing loss.    Eyes:  Negative for double vision and visual disturbance.   Cardiovascular:  Negative for chest pain.   Respiratory:  Negative for shortness of breath.    Endocrine: Negative for cold intolerance.   Hematologic/Lymphatic: Does not bruise/bleed easily.   Skin:  Negative for  poor wound healing and suspicious lesions.   Musculoskeletal:  Negative for gout, joint pain and joint swelling.   Gastrointestinal:  Negative for nausea and vomiting.   Genitourinary:  Negative for dysuria.   Neurological:  Negative for numbness, paresthesias and sensory change.   Psychiatric/Behavioral:  Positive for substance abuse. Negative for depression and memory loss. The patient is not nervous/anxious.    Allergic/Immunologic: Negative for persistent infections.       Objective:   Body mass index is 26.12 kg/m².  There were no vitals filed for this visit.             General    Constitutional: He is oriented to person, place, and time. He appears well-developed and well-nourished. No distress.   HENT:   Head: Normocephalic.   Eyes: EOM are normal.   Pulmonary/Chest: Effort normal.   Neurological: He is oriented to person, place, and time.   Psychiatric: He has a normal mood and affect.         Left Hand/Wrist Exam     Inspection   Scars: Wrist - absent Hand -  absent  Effusion: Wrist - absent Hand -  absent    Pain   Hand - The patient exhibits pain of the middle MCP.    Other     Sensory Exam  Median Distribution: normal  Ulnar Distribution: normal  Radial Distribution: normal    Comments:  The patient has tenderness about the base of the long finger metacarpal without deformity          Vascular Exam       Capillary Refill  Left Hand: normal capillary refill          Relevant imaging results reviewed and interpreted by me, discussed with the patient and / or family today radiographs left hand showed a nondisplaced proximal extra-articular base of the long finger metacarpal shaft  Assessment:     Encounter Diagnosis   Name Primary?    Closed nondisplaced fracture of base of third metacarpal bone of left hand, initial encounter Yes        Plan:     The patient had a cock-up wrist splint applied.  He will return in 3 weeks for re-x-ray left hand                Disclaimer: This note was prepared using a voice  recognition system and is likely to have sound alike errors within the text.

## 2024-05-22 ENCOUNTER — OFFICE VISIT (OUTPATIENT)
Dept: SPORTS MEDICINE | Facility: CLINIC | Age: 59
End: 2024-05-22
Payer: MEDICAID

## 2024-05-22 ENCOUNTER — HOSPITAL ENCOUNTER (OUTPATIENT)
Dept: RADIOLOGY | Facility: HOSPITAL | Age: 59
Discharge: HOME | End: 2024-05-22
Attending: STUDENT IN AN ORGANIZED HEALTH CARE EDUCATION/TRAINING PROGRAM
Payer: MEDICAID

## 2024-05-22 VITALS — BODY MASS INDEX: 26.51 KG/M2 | WEIGHT: 200 LBS | HEIGHT: 73 IN

## 2024-05-22 DIAGNOSIS — M67.921 TENDINOPATHY OF RIGHT BICEPS TENDON: ICD-10-CM

## 2024-05-22 DIAGNOSIS — M67.911 TENDINOPATHY OF RIGHT ROTATOR CUFF: Primary | ICD-10-CM

## 2024-05-22 DIAGNOSIS — M25.512 CHRONIC PAIN OF BOTH SHOULDERS: ICD-10-CM

## 2024-05-22 DIAGNOSIS — M19.019 OSTEOARTHRITIS OF ACROMIOCLAVICULAR JOINT: ICD-10-CM

## 2024-05-22 DIAGNOSIS — G89.29 CHRONIC PAIN OF BOTH SHOULDERS: ICD-10-CM

## 2024-05-22 DIAGNOSIS — M25.511 ACUTE PAIN OF RIGHT SHOULDER: ICD-10-CM

## 2024-05-22 DIAGNOSIS — M25.511 CHRONIC PAIN OF BOTH SHOULDERS: ICD-10-CM

## 2024-05-22 DIAGNOSIS — M75.51 SUBACROMIAL BURSITIS OF RIGHT SHOULDER JOINT: ICD-10-CM

## 2024-05-22 PROCEDURE — 3008F BODY MASS INDEX DOCD: CPT | Mod: CPTII,,, | Performed by: STUDENT IN AN ORGANIZED HEALTH CARE EDUCATION/TRAINING PROGRAM

## 2024-05-22 PROCEDURE — 73030 X-RAY EXAM OF SHOULDER: CPT | Mod: 26,RT,, | Performed by: RADIOLOGY

## 2024-05-22 PROCEDURE — 99214 OFFICE O/P EST MOD 30 MIN: CPT | Mod: 25,S$PBB,, | Performed by: STUDENT IN AN ORGANIZED HEALTH CARE EDUCATION/TRAINING PROGRAM

## 2024-05-22 PROCEDURE — 99999PBSHW PR PBB SHADOW TECHNICAL ONLY FILED TO HB: Mod: PBBFAC,,,

## 2024-05-22 PROCEDURE — 73030 X-RAY EXAM OF SHOULDER: CPT | Mod: 26,LT,, | Performed by: RADIOLOGY

## 2024-05-22 PROCEDURE — 1160F RVW MEDS BY RX/DR IN RCRD: CPT | Mod: CPTII,,, | Performed by: STUDENT IN AN ORGANIZED HEALTH CARE EDUCATION/TRAINING PROGRAM

## 2024-05-22 PROCEDURE — 99213 OFFICE O/P EST LOW 20 MIN: CPT | Mod: PBBFAC,25 | Performed by: STUDENT IN AN ORGANIZED HEALTH CARE EDUCATION/TRAINING PROGRAM

## 2024-05-22 PROCEDURE — 99999 PR PBB SHADOW E&M-EST. PATIENT-LVL III: CPT | Mod: PBBFAC,,, | Performed by: STUDENT IN AN ORGANIZED HEALTH CARE EDUCATION/TRAINING PROGRAM

## 2024-05-22 PROCEDURE — 73030 X-RAY EXAM OF SHOULDER: CPT | Mod: TC,50

## 2024-05-22 PROCEDURE — 20611 DRAIN/INJ JOINT/BURSA W/US: CPT | Mod: PBBFAC | Performed by: STUDENT IN AN ORGANIZED HEALTH CARE EDUCATION/TRAINING PROGRAM

## 2024-05-22 PROCEDURE — 1159F MED LIST DOCD IN RCRD: CPT | Mod: CPTII,,, | Performed by: STUDENT IN AN ORGANIZED HEALTH CARE EDUCATION/TRAINING PROGRAM

## 2024-05-22 RX ORDER — TRIAMCINOLONE ACETONIDE 40 MG/ML
40 INJECTION, SUSPENSION INTRA-ARTICULAR; INTRAMUSCULAR
Status: DISCONTINUED | OUTPATIENT
Start: 2024-05-22 | End: 2024-05-22 | Stop reason: HOSPADM

## 2024-05-22 RX ADMIN — TRIAMCINOLONE ACETONIDE 40 MG: 200 INJECTION, SUSPENSION INTRA-ARTICULAR; INTRAMUSCULAR at 02:05

## 2024-05-22 NOTE — PROCEDURES
Large Joint Aspiration/Injection: R subacromial bursa    Date/Time: 5/22/2024 2:00 PM    Performed by: Noah Rae MD  Authorized by: Noah Rae MD    Consent Done?:  Yes (Verbal)  Indications:  Pain  Site marked: the procedure site was marked    Timeout: prior to procedure the correct patient, procedure, and site was verified    Prep: patient was prepped and draped in usual sterile fashion    Local anesthetic:  Bupivacaine 0.5% without epinephrine and lidocaine 1% without epinephrine    Details:  Needle Size:  21 G  Ultrasonic Guidance for needle placement?: Yes    Images are saved and documented.  Approach:  Lateral  Location:  Shoulder  Site:  R subacromial bursa  Medications:  40 mg triamcinolone acetonide 40 mg/mL  Patient tolerance:  Patient tolerated the procedure well with no immediate complications     Ultrasound guidance was used for needle localization. Images were saved and stored for documentation. The appropriate structures were visualized. Dynamic visualization of the needle was continuous throughout the procedures and maintained good position.

## 2024-05-22 NOTE — PATIENT INSTRUCTIONS
Assessment:  Madhav Ruiz is a 59 y.o. male   Chief Complaint   Patient presents with    Right Shoulder - Pain    Left Shoulder - Pain       Encounter Diagnosis   Name Primary?    Acute pain of right shoulder Yes        Plan:  Ultrasound guided subacromial cortisone injection to the right shoulder  We discussed the proper protocols after the injection such as no submerging pools, baths tubs, or hot tubs for 24 hr.  Showering is okay today.  We also discussed that blood sugars can be elevated after an injection and asked patient to properly checked her sugars over the next few days and contact their PCP if there are any concerns.  We discussed red flags such as fevers, chills, red, warm, tender joint at the area of injection to please seek medical care immediately.    Follow up 3 months     Follow-up: 3 months or sooner if there are problems between now and then.    Thank you for choosing [facility] and Dr. Noah Rae for your orthopedic & sports medicine care. It is our goal to provide you with exceptional care that will help keep you healthy, active, and get you back in the game.    Please do not hesitate to reach out to us via email, phone, or MyChart with any questions, concerns, or feedback.    If you felt that you received exemplary care today, please consider leaving us feedback on [employer] at:  [email or web address]    If you are experiencing pain/discomfort ,or have questions after 5pm and would like to be connected to the [facility] on-call team, please call this number and specify which Sports Medicine provider is treating you: [phone]

## 2024-05-22 NOTE — PROGRESS NOTES
Patient ID: Madhav Ruiz  YOB: 1965  MRN: [id number]    Chief Complaint: Pain of the Right Shoulder and Pain of the Left Shoulder      Referred By: Self for Bilateral Shoulder Pain after fall    History of Present Illness: Madhav Ruiz is a left-hand dominant 59 y.o. male who presents today with bilateral shoulder pain, right greater than left.  Patient last seen in clinic on 3/6/2024 where he received a subacromial cortisone injection to the left shoulder.  Reports that shoulder was doing well following the CSI and that he attended a few visits of physical therapy and then went out of town.  While out of town, patient fell on April 28 and reports aggravating the left shoulder, in the anterior region, and injuring the right shoulder.  Reports pain of a 7-8/10 in the right shoulder with pain located at the anterior region of the right shoulder.  Reports pain as a throbbing pain and that he is unable to raise his right shoulder without assistance of the left.  When raising the shoulder the right arm develops a sharp pain.  Patient was taking meloxicam but ran out of prescription and then received Ibuprofen 600 mg when he injured his left hand during the same fall.  He is currently taking a medication prescribed Sulindac by Dr. Cavazos and was instructed to stop all other NSAIDs and his Gabapentin.  Patient has not returned to PT since the fall and is currently using ice and Biofreeze for symptom relief.     The patient is active in none.  Occupation:       Past Medical History:   Past Medical History:   Diagnosis Date    Acid reflux     Edema of left lower extremity     Left knee    History of alcohol abuse      Past Surgical History:   Procedure Laterality Date    ESOPHAGOGASTRODUODENOSCOPY N/A 10/4/2023    Procedure: EGD (ESOPHAGOGASTRODUODENOSCOPY);  Surgeon: Polo Montaño MD;  Location: [address]);  Service: Gastroenterology;  Laterality: N/A;  referral: Jennifer Scheuermann, PA-  possible banding / Hx varices past EGd at outside facility / Pt hx hematemesis- 2nd flr / prep ins on portal/ cirrhosis - labs - ERW  9/27-precall complete-MS    head procedure       No family history on file.  Social History     Socioeconomic History    Marital status:    Tobacco Use    Smoking status: Every Day     Current packs/day: 0.50     Types: Cigarettes    Smokeless tobacco: Never   Substance and Sexual Activity    Alcohol use: Yes     Alcohol/week: 20.0 standard drinks of alcohol     Types: 20 Shots of liquor per week    Drug use: Never    Sexual activity: Yes     Partners: Female     Social Determinants of Health     Financial Resource Strain: Low Risk  (3/22/2024)    Overall Financial Resource Strain (CARDIA)     Difficulty of Paying Living Expenses: Not hard at all   Food Insecurity: Food Insecurity Present (3/22/2024)    Hunger Vital Sign     Worried About Running Out of Food in the Last Year: Never true     Ran Out of Food in the Last Year: Sometimes true   Transportation Needs: No Transportation Needs (3/22/2024)    PRAPARE - Transportation     Lack of Transportation (Medical): No     Lack of Transportation (Non-Medical): No   Housing Stability: High Risk (3/22/2024)    Housing Stability Vital Sign     Unable to Pay for Housing in the Last Year: Yes     Number of Places Lived in the Last Year: 2     Unstable Housing in the Last Year: Yes     Medication List with Changes/Refills   Current Medications    DICLOFENAC SODIUM (VOLTAREN) 1 % GEL    Apply 2 g topically 2 (two) times daily.    FLU VACC [id number] 6MOS UP,PF, (FLUARIX QUAD 4861-1026, PF,) 60 MCG (15 MCG X 4)/0.5 ML SYRG    use as directed    FUROSEMIDE (LASIX) 20 MG TABLET    TAKE 2 TABLETS BY MOUTH ONCE DAILY    GABAPENTIN (NEURONTIN) 300 MG CAPSULE    Take 1 capsule (300 mg total) by mouth 2 (two) times daily.    IBUPROFEN (ADVIL,MOTRIN) 600 MG TABLET    Take 600 mg by mouth every 6 (six) hours.    LACTULOSE (CHRONULAC) 20 GRAM/30 ML  SOLN    Take 30 mLs (20 g total) by mouth once daily.    MELOXICAM (MOBIC) 15 MG TABLET    Take 1 tablet (15 mg total) by mouth once daily. Take 1 tab daily for 14 days, then daily as needed.    METHOCARBAMOL (ROBAXIN) 500 MG TAB    Take 1 tablet (500 mg total) by mouth 4 (four) times daily as needed (muscle spasms).    NALTREXONE (DEPADE) 50 MG TABLET    Take 50 mg by mouth every morning.    PANTOPRAZOLE (PROTONIX) 40 MG TABLET    TAKE ONE TABLET BY MOUTH ONE TIME DAILY    SOFOSBUVIR-VELPATASVIR 400-100 MG TAB    Take 1 tablet by mouth once daily.    SPIRONOLACTONE (ALDACTONE) 50 MG TABLET    TAKE 2 TABLETS BY MOUTH ONCE DAILY    SULINDAC (CLINORIL) 200 MG TAB    Take 1 tablet (200 mg total) by mouth 2 (two) times daily as needed (Pain).    TRAZODONE (DESYREL) 100 MG TABLET    Take 100 mg by mouth every evening.     Review of patient's allergies indicates:  No Known Allergies    Physical Exam:   Body mass index is 26.39 kg/m².    GENERAL: Well appearing, in no acute distress.  HEAD: Normocephalic and atraumatic.  ENT: External ears and nose grossly normal.  EYES: EOMI bilaterally  PULMONARY: Respirations are grossly even and non-labored.  NEURO: Awake, alert, and oriented x 3.  SKIN: No obvious rashes appreciated.  PSYCH: Mood & affect are appropriate.    Detailed MSK exam:     Right shoulder exam:   -ROM: abduction 100, forward flexion 100, external rotation 80, internal rotation 70  -empty can test pain but no weakness, resisted ER negative, belly press pain but no weakness  -moralez test positive, neers test negative, whipple test positive  -biceps load test negative, yerguson test negative, Pulaski's test negative  -sensation intact, pulses 2+  -TTP: bicipital groove and lateral cuff insertion    Left shoulder exam:   -ROM: abduction 120, forward flexion 120, external rotation 80, internal rotation 70  -empty can test pain but no weakness, resisted ER negative, belly press pain but no weakness  -moralez test  negative, neers test negative, whipple test positive  -biceps load test negative, yerguson test negative, Brooks's test negative  -sensation intact, pulses 2+  -TTP: none      Imaging:  X-Ray Shoulder Complete Bilateral  Narrative: EXAM:  XR SHOULDER COMPLETE 2 OR MORE VIEWS BILATERAL    CLINICAL HISTORY:  Shoulder pain.  4 views bilateral shoulders.    FINDINGS: Left shoulder: Spurring of the left AC joint with preservation of the joint space.  Glenohumeral joint space is normal.  No fracture or dislocation.    Right shoulder: Minimal spurring inferiorly of the right AC joint.  Glenohumeral joint space is normal.  No fracture or dislocation.  Impression:  Mild spurring bilateral midclavicular joints.  No acute findings.    Finalized on: 5/22/2024 2:12 PM By:  Manuel Fraser MD  [facility]# [id number]      2024-05-22 14:[id number]    [facility]        Relevant imaging results were reviewed and interpreted by me and per my read shows AC arthritic changes bilaterally.  This was discussed with the patient and / or family today.     Assessment:  Madhav Ruiz is a 59 y.o. male presenting with right shoulder pain s/p fall.   History, physical and radiographs are consistent with a likely diagnosis of RC tendinopathy vs partial tear, biceps tendinopathy, OA.   Plan: Steroid injection given today (see separate procedure note for details). We discussed the proper protocols after the injection such as no submerging pools, baths tubs, or hot tubs for 24 hr.  Showering is okay today.  We also discussed that blood sugars can be elevated after an injection and asked patient to properly checked her sugars over the next few days and contact their PCP if there are any concerns.  We discussed red flags such as fevers, chills, red, warm, tender joint at the area of injection to please seek medical care immediately. Continue conservative management for pain.   Follow up 3 months. All questions answered.      Tendinopathy of right rotator cuff  -      Large Joint Aspiration/Injection: R subacromial bursa    Acute pain of right shoulder  -     Sports Medicine US - Guidance for Needle Placement    Osteoarthritis of acromioclavicular joint    Subacromial bursitis of right shoulder joint  -     Large Joint Aspiration/Injection: R subacromial bursa    Tendinopathy of right biceps tendon         Ultrasound guidance was used for needle localization. Images were saved and stored for documentation. The appropriate structures were visualized. Dynamic visualization of the needle was continuous throughout the procedures and maintained good position.      A copy of today's visit note has been sent to the referring provider.     Electronically signed:  Noah Rae MD, MPH  05/22/2024  2:17 PM

## 2024-05-22 NOTE — PROCEDURES
Sports Medicine US - Guidance for Needle Placement    Date/Time: 5/22/2024 2:00 PM    Performed by: Noah Rae MD  Authorized by: Noah Rae MD  Preparation: Patient was prepped and draped in the usual sterile fashion.  Local anesthesia used: no    Anesthesia:  Local anesthesia used: no    Sedation:  Patient sedated: no    Patient tolerance: patient tolerated the procedure well with no immediate complications  Comments: Ultrasound guidance was used for needle localization. Images were saved and stored for documentation. The appropriate structures were visualized. Dynamic visualization of the needle was continuous throughout the procedures and maintained good position.

## 2024-05-23 ENCOUNTER — TELEPHONE (OUTPATIENT)
Dept: HEPATOLOGY | Facility: CLINIC | Age: 59
End: 2024-05-23
Payer: MEDICAID

## 2024-05-23 DIAGNOSIS — K74.60 HEPATIC CIRRHOSIS, UNSPECIFIED HEPATIC CIRRHOSIS TYPE, UNSPECIFIED WHETHER ASCITES PRESENT: Primary | ICD-10-CM

## 2024-05-23 NOTE — TELEPHONE ENCOUNTER
Pt began 24 weeks Epclusa on 9/13/23  Anticipated treatment end date: 2/27/24 --> (+) adherence problems due to pt and insurance issues. Exact EOT date not known but wife previously reported pt still on epclusa 3/22/24  F 4 decompensated  Ebony 1A  Prior HCV treatment: No    3/22/24 Video visit: cancelled b/c pt wasn't home. Per wife, still on epclusa at that time    4/11/24 Video visit: NO SHOW      5/21/24  HCV neg  LFT stable    Pls call pt:  Labs show liver enzymes are normal. Hep C virus not showing up in blood. This is good sign, however it's too early to know if virus is cured since his treatment ended later than expected  If possible, find out when pt finished epclusa    Schedule HCV RNA, CBC, CMP, INR, AFP, Peth, U/S, VISIT - 7/2024

## 2024-05-24 ENCOUNTER — TELEPHONE (OUTPATIENT)
Dept: ORTHOPEDICS | Facility: CLINIC | Age: 59
End: 2024-05-24
Payer: MEDICAID

## 2024-05-24 DIAGNOSIS — B18.2 CHRONIC HEPATITIS C WITHOUT HEPATIC COMA: Primary | ICD-10-CM

## 2024-05-24 NOTE — TELEPHONE ENCOUNTER
----- Message from Miguelina Marrero sent at 5/24/2024  9:05 AM CDT -----  Type:  Needs Medical Advice    Who Called: Madhav Ruiz ( pt's wife, Alana)   Symptoms (please be specific): -   How long has patient had these symptoms:  -  Pharmacy name and phone #:  -  Would the patient rather a call back or a response via [facility]?    Best Call Back Number: [phone]    Additional Information: wants to know which pharmacy pt's medication was sent to please call to advise     FYI:

## 2024-05-24 NOTE — TELEPHONE ENCOUNTER
I spoke with Mrs Ruiz and msg from PA Scheuermann relayed.  Patient and his wife could not provide an EOT date.  Testing scheduled 7/19/24 and f/u visit scheduled 7/26/24; appt reminder notice mailed.

## 2024-05-24 NOTE — TELEPHONE ENCOUNTER
Returned the patient's wife phone call in regards to their message.Patient's wife states that this message was supposed to go to Dr. Cox's staff box. Patient's wife states that Dr. Cox was going to call in pain medication with the patient saw him on 05/21/24. Informed the patient's wife that Dr. Cox is out of the office until Tuesday and we will speak with him when he returns. Verbalized understanding.

## 2024-05-29 ENCOUNTER — PATIENT MESSAGE (OUTPATIENT)
Dept: SPORTS MEDICINE | Facility: CLINIC | Age: 59
End: 2024-05-29
Payer: MEDICAID

## 2024-06-03 ENCOUNTER — TELEPHONE (OUTPATIENT)
Dept: SPORTS MEDICINE | Facility: CLINIC | Age: 59
End: 2024-06-03
Payer: MEDICAID

## 2024-06-03 NOTE — TELEPHONE ENCOUNTER
Sent patient a response to a later request that she sent for appt time to let her know that appt has been r/s to Thursday, June 13 at 3:40.  Provider is not at [facility] on Mondays and Fridays.

## 2024-06-12 ENCOUNTER — TELEPHONE (OUTPATIENT)
Dept: SPORTS MEDICINE | Facility: CLINIC | Age: 59
End: 2024-06-12
Payer: MEDICAID

## 2024-06-12 NOTE — TELEPHONE ENCOUNTER
Called and spoke to patient's wife regarding appt on Thursday.  Wife r/s appt to Friday morning in [place].  Verbalized understanding of new appt date, time and location.

## 2024-06-24 ENCOUNTER — TELEPHONE (OUTPATIENT)
Dept: SPORTS MEDICINE | Facility: CLINIC | Age: 59
End: 2024-06-24
Payer: MEDICAID

## 2024-06-24 NOTE — TELEPHONE ENCOUNTER
Received phone call from patient's wife needing to schedule follow up for left shoulder.  Confirmed with patient and wife, while on the phone, that visit is for the left shoulder.  Patient scheduled for Thursday, June 27.  Wife verbalized understanding of appt date, time and location.

## 2024-06-26 DIAGNOSIS — M25.552 BILATERAL HIP PAIN: Primary | ICD-10-CM

## 2024-06-26 DIAGNOSIS — M25.551 BILATERAL HIP PAIN: Primary | ICD-10-CM

## 2024-06-28 ENCOUNTER — HOSPITAL ENCOUNTER (OUTPATIENT)
Dept: RADIOLOGY | Facility: HOSPITAL | Age: 59
Discharge: HOME | End: 2024-06-28
Attending: ORTHOPAEDIC SURGERY
Payer: MEDICAID

## 2024-06-28 ENCOUNTER — OFFICE VISIT (OUTPATIENT)
Dept: ORTHOPEDICS | Facility: CLINIC | Age: 59
End: 2024-06-28
Payer: MEDICAID

## 2024-06-28 VITALS — WEIGHT: 199.94 LBS | BODY MASS INDEX: 26.5 KG/M2 | HEIGHT: 73 IN

## 2024-06-28 VITALS — BODY MASS INDEX: 26.51 KG/M2 | WEIGHT: 200 LBS | HEIGHT: 73 IN

## 2024-06-28 DIAGNOSIS — S62.343D CLOSED NONDISPLACED FRACTURE OF BASE OF THIRD METACARPAL BONE OF LEFT HAND WITH ROUTINE HEALING, SUBSEQUENT ENCOUNTER: Primary | ICD-10-CM

## 2024-06-28 DIAGNOSIS — M17.0 PRIMARY OSTEOARTHRITIS OF BOTH KNEES: Primary | ICD-10-CM

## 2024-06-28 DIAGNOSIS — M75.102 TEAR OF LEFT ROTATOR CUFF, UNSPECIFIED TEAR EXTENT, UNSPECIFIED WHETHER TRAUMATIC: Primary | ICD-10-CM

## 2024-06-28 DIAGNOSIS — M19.019 OSTEOARTHRITIS OF ACROMIOCLAVICULAR JOINT: ICD-10-CM

## 2024-06-28 DIAGNOSIS — M79.642 LEFT HAND PAIN: ICD-10-CM

## 2024-06-28 PROCEDURE — 99213 OFFICE O/P EST LOW 20 MIN: CPT | Mod: PBBFAC,27,25 | Performed by: ORTHOPAEDIC SURGERY

## 2024-06-28 PROCEDURE — 99213 OFFICE O/P EST LOW 20 MIN: CPT | Mod: PBBFAC,PO | Performed by: STUDENT IN AN ORGANIZED HEALTH CARE EDUCATION/TRAINING PROGRAM

## 2024-06-28 PROCEDURE — 99999 PR PBB SHADOW E&M-EST. PATIENT-LVL III: CPT | Mod: PBBFAC,,, | Performed by: STUDENT IN AN ORGANIZED HEALTH CARE EDUCATION/TRAINING PROGRAM

## 2024-06-28 PROCEDURE — 99999 PR PBB SHADOW E&M-EST. PATIENT-LVL III: CPT | Mod: PBBFAC,,, | Performed by: ORTHOPAEDIC SURGERY

## 2024-06-28 PROCEDURE — 73130 X-RAY EXAM OF HAND: CPT | Mod: 26,LT,, | Performed by: RADIOLOGY

## 2024-06-28 PROCEDURE — 73130 X-RAY EXAM OF HAND: CPT | Mod: TC,LT

## 2024-06-28 RX ORDER — TRIAMCINOLONE ACETONIDE 40 MG/ML
40 INJECTION, SUSPENSION INTRA-ARTICULAR; INTRAMUSCULAR
Status: DISCONTINUED | OUTPATIENT
Start: 2024-06-28 | End: 2024-06-28 | Stop reason: HOSPADM

## 2024-06-28 RX ADMIN — TRIAMCINOLONE ACETONIDE 40 MG: 40 INJECTION, SUSPENSION INTRA-ARTICULAR; INTRAMUSCULAR at 01:06

## 2024-06-28 NOTE — PROCEDURES
Large Joint Aspiration/Injection: L subacromial bursa    Date/Time: 6/28/2024 1:00 PM    Performed by: Noah Rae MD  Authorized by: Noah Rae MD    Consent Done?:  Yes (Verbal)  Indications:  Pain  Site marked: the procedure site was marked    Timeout: prior to procedure the correct patient, procedure, and site was verified    Prep: patient was prepped and draped in usual sterile fashion    Local anesthetic:  Bupivacaine 0.5% without epinephrine and lidocaine 1% without epinephrine    Details:  Needle Size:  21 G  Ultrasonic Guidance for needle placement?: Yes    Images are saved and documented.  Approach:  Lateral  Location:  Shoulder  Site:  L subacromial bursa  Medications:  40 mg triamcinolone acetonide 40 mg/mL  Patient tolerance:  Patient tolerated the procedure well with no immediate complications     Ultrasound guidance was used for needle localization. Images were saved and stored for documentation. The appropriate structures were visualized. Dynamic visualization of the needle was continuous throughout the procedures and maintained good position.

## 2024-06-28 NOTE — PROCEDURES
Sports Medicine US - Guidance for Needle Placement    Date/Time: 6/28/2024 1:00 PM    Performed by: Noah Rae MD  Authorized by: Noah Rae MD  Preparation: Patient was prepped and draped in the usual sterile fashion.  Local anesthesia used: no    Anesthesia:  Local anesthesia used: no    Sedation:  Patient sedated: no    Patient tolerance: patient tolerated the procedure well with no immediate complications  Comments: Ultrasound guidance was used for needle localization. Images were saved and stored for documentation. The appropriate structures were visualized. Dynamic visualization of the needle was continuous throughout the procedures and maintained good position.

## 2024-06-28 NOTE — PATIENT INSTRUCTIONS
Assessment:  Madhva Ruiz is a 59 y.o. male   Chief Complaint   Patient presents with    Left Shoulder - Pain       Encounter Diagnosis   Name Primary?    Tear of left rotator cuff, unspecified tear extent, unspecified whether traumatic Yes        Plan:  Ultrasound guided subacromial cortisone injection to the left shoulder  We discussed the proper protocols after the injection such as no submerging pools, baths tubs, or hot tubs for 24 hr.  Showering is okay today.  We also discussed that blood sugars can be elevated after an injection and asked patient to properly checked her sugars over the next few days and contact their PCP if there are any concerns.  We discussed red flags such as fevers, chills, red, warm, tender joint at the area of injection to please seek medical care immediately.    Follow up as needed    Follow-up: as needed.    Thank you for choosing [facility] and Dr. Noah Rae for your orthopedic & sports medicine care. It is our goal to provide you with exceptional care that will help keep you healthy, active, and get you back in the game.    Please do not hesitate to reach out to us via email, phone, or MyChart with any questions, concerns, or feedback.    If you felt that you received exemplary care today, please consider leaving us feedback on [email or web address] at:  [email or web address]    If you are experiencing pain/discomfort ,or have questions after 5pm and would like to be connected to the [facility] on-call team, please call this number and specify which Sports Medicine provider is treating you: [phone]

## 2024-06-28 NOTE — PROGRESS NOTES
Patient ID: Madhav Ruiz  YOB: 1965  MRN: [id number]    Chief Complaint: Pain of the Left Shoulder      History of Present Illness: Madhav Ruiz is a left-hand dominant 59 y.o. male who presents today with left shoulder pain.  Patient last seen in clinic for the left shoulder on 3/6/2024 where he received a subacromial cortisone injection.  Reports that injection was successful until pain started to return at the end of May.  Pain is intermittent in nature and described as achy.  Rates pain today at a 7/10 and located in the anterior shoulder / upper arm region.  Patient has tried PT at [facility] for the condition. Interested in repeating another CSI today if possible.     3/6/2024 Interval HPI: here for MRI review.     3/4/2024 Interval History of Present Illness: Madhav Ruiz is a left-hand dominant 58 y.o. male who presents today with left shoulder pain.  Patient reports that he has had intermittent left shoulder pain since 2020, but that the pain returned constant last month following patient playing basketball.  He reports he is unable to sleep on his shoulder, unable to turn a screw drive without pain and unable to  objects due to lack of strength and pain.  He describes the pain as a constant achy, throbbing pain and states he has numbness present in his arm.  He rates his pain today at a 7-8/10 and states the pain is in the deep anterior shoulder region.  He states that he went to  several weeks ago and received an IM injection which lasted approximately 1-2 days and that he has used Biofreeze to help with his pain. He has taken Meloxicam and Tizanidine but no longer has medication removing.  He is currently finishing up a prescription for Hepatitis C and has 8 pills remaining.      The patient is active in none.  Occupation:      Past Medical History:   Past Medical History:   Diagnosis Date    Acid reflux     Edema of left lower extremity     Left knee    History of alcohol abuse       Past Surgical History:   Procedure Laterality Date    ESOPHAGOGASTRODUODENOSCOPY N/A 10/4/2023    Procedure: EGD (ESOPHAGOGASTRODUODENOSCOPY);  Surgeon: Polo Montaño MD;  Location: [facility] (2ND FLR);  Service: Gastroenterology;  Laterality: N/A;  referral: Jennifer Scheuermann, PA- possible banding / Hx varices past EGd at outside facility / Pt hx hematemesis- 2nd flr / prep ins on portal/ cirrhosis - labs - ERW  9/27-precall complete-MS    head procedure       No family history on file.  Social History     Socioeconomic History    Marital status:    Tobacco Use    Smoking status: Every Day     Current packs/day: 0.50     Types: Cigarettes    Smokeless tobacco: Never   Substance and Sexual Activity    Alcohol use: Yes     Alcohol/week: 20.0 standard drinks of alcohol     Types: 20 Shots of liquor per week    Drug use: Never    Sexual activity: Yes     Partners: Female     Social Determinants of Health     Financial Resource Strain: Low Risk  (3/22/2024)    Overall Financial Resource Strain (CARDIA)     Difficulty of Paying Living Expenses: Not hard at all   Food Insecurity: Food Insecurity Present (3/22/2024)    Hunger Vital Sign     Worried About Running Out of Food in the Last Year: Never true     Ran Out of Food in the Last Year: Sometimes true   Transportation Needs: No Transportation Needs (3/22/2024)    PRAPARE - Transportation     Lack of Transportation (Medical): No     Lack of Transportation (Non-Medical): No   Housing Stability: High Risk (3/22/2024)    Housing Stability Vital Sign     Unable to Pay for Housing in the Last Year: Yes     Number of Places Lived in the Last Year: 2     Unstable Housing in the Last Year: Yes     Medication List with Changes/Refills   Current Medications    DICLOFENAC SODIUM (VOLTAREN) 1 % GEL    Apply 2 g topically 2 (two) times daily.    FLU VACC [id number] 6MOS UP,PF, (FLUARIX QUAD 0309-5567, PF,) 60 MCG (15 MCG X 4)/0.5 ML SYRG    use as directed    FUROSEMIDE  (LASIX) 20 MG TABLET    TAKE 2 TABLETS BY MOUTH ONCE DAILY    GABAPENTIN (NEURONTIN) 300 MG CAPSULE    Take 1 capsule (300 mg total) by mouth 2 (two) times daily.    IBUPROFEN (ADVIL,MOTRIN) 600 MG TABLET    Take 600 mg by mouth every 6 (six) hours.    LACTULOSE (CHRONULAC) 20 GRAM/30 ML SOLN    Take 30 mLs (20 g total) by mouth once daily.    MELOXICAM (MOBIC) 15 MG TABLET    Take 1 tablet (15 mg total) by mouth once daily. Take 1 tab daily for 14 days, then daily as needed.    METHOCARBAMOL (ROBAXIN) 500 MG TAB    Take 1 tablet (500 mg total) by mouth 4 (four) times daily as needed (muscle spasms).    NALTREXONE (DEPADE) 50 MG TABLET    Take 50 mg by mouth every morning.    PANTOPRAZOLE (PROTONIX) 40 MG TABLET    TAKE ONE TABLET BY MOUTH ONE TIME DAILY    SOFOSBUVIR-VELPATASVIR 400-100 MG TAB    Take 1 tablet by mouth once daily.    SPIRONOLACTONE (ALDACTONE) 50 MG TABLET    TAKE 2 TABLETS BY MOUTH ONCE DAILY    SULINDAC (CLINORIL) 200 MG TAB    Take 1 tablet (200 mg total) by mouth 2 (two) times daily as needed (Pain).    TRAZODONE (DESYREL) 100 MG TABLET    Take 100 mg by mouth every evening.     Review of patient's allergies indicates:  No Known Allergies    Physical Exam:   Body mass index is 26.39 kg/m².    GENERAL: Well appearing, in no acute distress.  HEAD: Normocephalic and atraumatic.  ENT: External ears and nose grossly normal.  EYES: EOMI bilaterally  PULMONARY: Respirations are grossly even and non-labored.  NEURO: Awake, alert, and oriented x 3.  SKIN: No obvious rashes appreciated.  PSYCH: Mood & affect are appropriate.    Detailed MSK exam:     Left shoulder exam:   -ROM: abduction 120, forward flexion 120, external rotation 80, internal rotation 60  -empty can test pain but no weakness, resisted ER negative, belly press pain but no weakness  -moralez test positive, neers test negative, whipple test positive  -biceps load test negative, yerguson test negative, [place]'s test negative  -sensation  intact, pulses 2+  -TTP: bicipital groove        Imaging:  Sports Medicine US - Guidance for Needle Placement  Noah Rae MD     5/22/2024  2:45 PM  Sports Medicine US - Guidance for Needle Placement    Date/Time: 5/22/2024 2:00 PM    Performed by: Noah Rae MD  Authorized by: Noah Rae MD  Preparation: Patient was prepped and   draped in the usual sterile fashion.  Local anesthesia used: no    Anesthesia:  Local anesthesia used: no    Sedation:  Patient sedated: no    Patient tolerance: patient tolerated the procedure well with no immediate   complications  Comments: Ultrasound guidance was used for needle localization. Images   were saved and stored for documentation. The appropriate structures were   visualized. Dynamic visualization of the needle was continuous throughout   the procedures and maintained good position.   X-Ray Shoulder Complete Bilateral  Narrative: EXAM:  XR SHOULDER COMPLETE 2 OR MORE VIEWS BILATERAL    CLINICAL HISTORY:  Shoulder pain.  4 views bilateral shoulders.    FINDINGS: Left shoulder: Spurring of the left AC joint with preservation of the joint space.  Glenohumeral joint space is normal.  No fracture or dislocation.    Right shoulder: Minimal spurring inferiorly of the right AC joint.  Glenohumeral joint space is normal.  No fracture or dislocation.  Impression:  Mild spurring bilateral midclavicular joints.  No acute findings.    Finalized on: 5/22/2024 2:12 PM By:  Manuel Fraser MD  [facility]# [id number]      2024-05-22 14:14:[id number]    [facility]        Relevant imaging results were reviewed and interpreted by me and per my read shows mild arthritic changes.  This was discussed with the patient and / or family today.     Assessment:  Madhav Ruiz is a 59 y.o. male following up for left shoulder pain. Did well with previous steroid injection and interested in repeating again today.   Plan: Steroid injection given today (see separate procedure note for details). We discussed  the proper protocols after the injection such as no submerging pools, baths tubs, or hot tubs for 24 hr.  Showering is okay today.  We also discussed that blood sugars can be elevated after an injection and asked patient to properly checked her sugars over the next few days and contact their PCP if there are any concerns.  We discussed red flags such as fevers, chills, red, warm, tender joint at the area of injection to please seek medical care immediately.   Continue conservative management for pain.   Follow up as needed. All questions answered.     Tear of left rotator cuff, unspecified tear extent, unspecified whether traumatic  -     Sports Medicine US - Guidance for Needle Placement  -     Large Joint Aspiration/Injection: L subacromial bursa    Osteoarthritis of acromioclavicular joint         Ultrasound guidance was used for needle localization. Images were saved and stored for documentation. The appropriate structures were visualized. Dynamic visualization of the needle was continuous throughout the procedures and maintained good position.      Electronically signed:  Noah Rae MD, MPH  06/28/2024  1:24 PM

## 2024-06-28 NOTE — PROGRESS NOTES
Subjective:     Patient ID: Madhav Ruiz is a 59 y.o. male.    Chief Complaint: Pain and Injury of the Left Hand      HPI:  The patient is a 59-year-old male who fell 04/28/2024 and injured his left 3rd metacarpal base, extra-articular and nondisplaced.  He has been in a wrist splint.  He seems to be doing well at this point.    Past Medical History:   Diagnosis Date    Acid reflux     Edema of left lower extremity     Left knee    History of alcohol abuse      Past Surgical History:   Procedure Laterality Date    ESOPHAGOGASTRODUODENOSCOPY N/A 10/4/2023    Procedure: EGD (ESOPHAGOGASTRODUODENOSCOPY);  Surgeon: Polo Montaño MD;  Location: [facility] (2ND FLR);  Service: Gastroenterology;  Laterality: N/A;  referral: Jennifer Scheuermann, PA- possible banding / Hx varices past EGd at outside facility / Pt hx hematemesis- 2nd flr / prep ins on portal/ cirrhosis - labs - ERW  9/27-precall complete-MS    head procedure       No family history on file.  Social History     Socioeconomic History    Marital status:    Tobacco Use    Smoking status: Every Day     Current packs/day: 0.50     Types: Cigarettes    Smokeless tobacco: Never   Substance and Sexual Activity    Alcohol use: Yes     Alcohol/week: 20.0 standard drinks of alcohol     Types: 20 Shots of liquor per week    Drug use: Never    Sexual activity: Yes     Partners: Female     Social Determinants of Health     Financial Resource Strain: Low Risk  (3/22/2024)    Overall Financial Resource Strain (CARDIA)     Difficulty of Paying Living Expenses: Not hard at all   Food Insecurity: Food Insecurity Present (3/22/2024)    Hunger Vital Sign     Worried About Running Out of Food in the Last Year: Never true     Ran Out of Food in the Last Year: Sometimes true   Transportation Needs: No Transportation Needs (3/22/2024)    PRAPARE - Transportation     Lack of Transportation (Medical): No     Lack of Transportation (Non-Medical): No   Housing Stability: High Risk  (3/22/2024)    Housing Stability Vital Sign     Unable to Pay for Housing in the Last Year: Yes     Number of Places Lived in the Last Year: 2     Unstable Housing in the Last Year: Yes     Medication List with Changes/Refills   Current Medications    DICLOFENAC SODIUM (VOLTAREN) 1 % GEL    Apply 2 g topically 2 (two) times daily.    FLU VACC [id number] 6MOS UP,PF, (FLUARIX QUAD 3082-6980, PF,) 60 MCG (15 MCG X 4)/0.5 ML SYRG    use as directed    FUROSEMIDE (LASIX) 20 MG TABLET    TAKE 2 TABLETS BY MOUTH ONCE DAILY    GABAPENTIN (NEURONTIN) 300 MG CAPSULE    Take 1 capsule (300 mg total) by mouth 2 (two) times daily.    IBUPROFEN (ADVIL,MOTRIN) 600 MG TABLET    Take 600 mg by mouth every 6 (six) hours.    LACTULOSE (CHRONULAC) 20 GRAM/30 ML SOLN    Take 30 mLs (20 g total) by mouth once daily.    MELOXICAM (MOBIC) 15 MG TABLET    Take 1 tablet (15 mg total) by mouth once daily. Take 1 tab daily for 14 days, then daily as needed.    METHOCARBAMOL (ROBAXIN) 500 MG TAB    Take 1 tablet (500 mg total) by mouth 4 (four) times daily as needed (muscle spasms).    NALTREXONE (DEPADE) 50 MG TABLET    Take 50 mg by mouth every morning.    PANTOPRAZOLE (PROTONIX) 40 MG TABLET    TAKE ONE TABLET BY MOUTH ONE TIME DAILY    SOFOSBUVIR-VELPATASVIR 400-100 MG TAB    Take 1 tablet by mouth once daily.    SPIRONOLACTONE (ALDACTONE) 50 MG TABLET    TAKE 2 TABLETS BY MOUTH ONCE DAILY    SULINDAC (CLINORIL) 200 MG TAB    Take 1 tablet (200 mg total) by mouth 2 (two) times daily as needed (Pain).    TRAZODONE (DESYREL) 100 MG TABLET    Take 100 mg by mouth every evening.     Review of patient's allergies indicates:  No Known Allergies  Review of Systems   Constitutional: Negative for malaise/fatigue.   HENT:  Negative for hearing loss.    Eyes:  Negative for double vision and visual disturbance.   Cardiovascular:  Negative for chest pain.   Respiratory:  Negative for shortness of breath.    Endocrine: Negative for cold intolerance.    Hematologic/Lymphatic: Does not bruise/bleed easily.   Skin:  Negative for poor wound healing and suspicious lesions.   Musculoskeletal:  Negative for gout, joint pain and joint swelling.   Gastrointestinal:  Negative for nausea and vomiting.   Genitourinary:  Negative for dysuria.   Neurological:  Positive for focal weakness. Negative for numbness, paresthesias and sensory change.   Psychiatric/Behavioral:  Positive for substance abuse. Negative for depression and memory loss. The patient is not nervous/anxious.    Allergic/Immunologic: Negative for persistent infections.       Objective:   Body mass index is 26.38 kg/m².  There were no vitals filed for this visit.             General    Constitutional: He is oriented to person, place, and time. He appears well-developed and well-nourished. No distress.   HENT:   Head: Normocephalic.   Eyes: EOM are normal.   Pulmonary/Chest: Effort normal.   Neurological: He is oriented to person, place, and time.   Psychiatric: He has a normal mood and affect.         Left Hand/Wrist Exam     Inspection   Scars: Wrist - absent Hand -  absent  Effusion: Wrist - absent Hand -  absent    Other     Sensory Exam  Median Distribution: normal  Ulnar Distribution: normal  Radial Distribution: normal    Comments:  The patient has no tenderness fracture base left 3rd metacarpal.          Vascular Exam       Capillary Refill  Left Hand: normal capillary refill          Relevant imaging results reviewed and interpreted by me, discussed with the patient and / or family today re-x-ray left hand showed anatomic position extra-articular base fracture 3rd metacarpal with good callus noted  Assessment:     Encounter Diagnosis   Name Primary?    Closed nondisplaced fracture of base of third metacarpal bone of left hand with routine healing, subsequent encounter Yes        Plan:       The patient can discontinue his splint.  He will work on motion.  He will return on a as needed  basis.              Disclaimer: This note was prepared using a voice recognition system and is likely to have sound alike errors within the text.

## 2024-07-25 ENCOUNTER — TELEPHONE (OUTPATIENT)
Dept: HEPATOLOGY | Facility: CLINIC | Age: 59
End: 2024-07-25
Payer: MEDICAID

## 2024-07-25 NOTE — TELEPHONE ENCOUNTER
----- Message from Loreta Ferro sent at 7/25/2024  3:53 PM CDT -----  Regarding: Change appt  Name of Who is Calling:Alana            What is the request in detail:pt wife is requesting a call back to push back the date for Madhav upcoming appointment. He has just had the test done. She wants to know if you have Aug. 1st or 2nd. Avail.      Can the clinic reply by [facility]:No            What [facility] to [facility] Back [facility]:[phone]

## 2024-07-25 NOTE — TELEPHONE ENCOUNTER
I spoke with patient's wife.  She moved hcc testing to 7/30/24.  Appt with PA Scheuermann moved to 8/9/24 per wife's request.

## 2024-07-30 ENCOUNTER — HOSPITAL ENCOUNTER (OUTPATIENT)
Dept: RADIOLOGY | Facility: HOSPITAL | Age: 59
Discharge: HOME | End: 2024-07-30
Attending: PHYSICIAN ASSISTANT
Payer: MEDICAID

## 2024-07-30 DIAGNOSIS — K74.60 HEPATIC CIRRHOSIS, UNSPECIFIED HEPATIC CIRRHOSIS TYPE, UNSPECIFIED WHETHER ASCITES PRESENT: ICD-10-CM

## 2024-07-30 PROCEDURE — 76705 ECHO EXAM OF ABDOMEN: CPT | Mod: 26,,, | Performed by: STUDENT IN AN ORGANIZED HEALTH CARE EDUCATION/TRAINING PROGRAM

## 2024-07-30 PROCEDURE — 76705 ECHO EXAM OF ABDOMEN: CPT | Mod: TC,PO

## 2024-08-09 ENCOUNTER — TELEPHONE (OUTPATIENT)
Dept: HEPATOLOGY | Facility: CLINIC | Age: 59
End: 2024-08-09
Payer: MEDICAID

## 2024-08-09 DIAGNOSIS — K83.8 COMMON BILE DUCT DILATION: ICD-10-CM

## 2024-08-09 DIAGNOSIS — R77.2 ELEVATED AFP: ICD-10-CM

## 2024-08-09 DIAGNOSIS — K74.60 HEPATIC CIRRHOSIS, UNSPECIFIED HEPATIC CIRRHOSIS TYPE, UNSPECIFIED WHETHER ASCITES PRESENT: Primary | ICD-10-CM

## 2024-08-09 DIAGNOSIS — R17 HIGH TOTAL BILIRUBIN: ICD-10-CM

## 2024-09-13 ENCOUNTER — TELEPHONE (OUTPATIENT)
Dept: ORTHOPEDICS | Facility: CLINIC | Age: 59
End: 2024-09-13
Payer: COMMERCIAL

## 2024-09-13 NOTE — TELEPHONE ENCOUNTER
----- Message from Jenn Arreaga sent at 9/13/2024  2:39 PM CDT -----  Patients wife is calling to receive a call back at .[phone] or [phone] Wife wanting to speak further regarding coverage for Monday's injections.

## 2024-10-15 ENCOUNTER — TELEPHONE (OUTPATIENT)
Dept: ORTHOPEDICS | Facility: CLINIC | Age: 59
End: 2024-10-15
Payer: MEDICAID

## 2024-10-15 DIAGNOSIS — M17.0 PRIMARY OSTEOARTHRITIS OF BOTH KNEES: Primary | ICD-10-CM

## 2024-10-23 ENCOUNTER — OFFICE VISIT (OUTPATIENT)
Dept: SPORTS MEDICINE | Facility: CLINIC | Age: 59
End: 2024-10-23
Payer: MEDICAID

## 2024-10-23 ENCOUNTER — HOSPITAL ENCOUNTER (OUTPATIENT)
Dept: RADIOLOGY | Facility: HOSPITAL | Age: 59
Discharge: HOME | End: 2024-10-23
Attending: STUDENT IN AN ORGANIZED HEALTH CARE EDUCATION/TRAINING PROGRAM
Payer: MEDICAID

## 2024-10-23 VITALS — WEIGHT: 199.94 LBS | HEIGHT: 73 IN | BODY MASS INDEX: 26.5 KG/M2

## 2024-10-23 DIAGNOSIS — M67.912 TENDINOPATHY OF LEFT ROTATOR CUFF: Primary | ICD-10-CM

## 2024-10-23 DIAGNOSIS — M67.911 TENDINOPATHY OF RIGHT ROTATOR CUFF: ICD-10-CM

## 2024-10-23 DIAGNOSIS — M25.552 BILATERAL HIP PAIN: ICD-10-CM

## 2024-10-23 DIAGNOSIS — M25.551 BILATERAL HIP PAIN: ICD-10-CM

## 2024-10-23 PROCEDURE — 3008F BODY MASS INDEX DOCD: CPT | Mod: CPTII,,, | Performed by: STUDENT IN AN ORGANIZED HEALTH CARE EDUCATION/TRAINING PROGRAM

## 2024-10-23 PROCEDURE — 99213 OFFICE O/P EST LOW 20 MIN: CPT | Mod: PBBFAC,25 | Performed by: STUDENT IN AN ORGANIZED HEALTH CARE EDUCATION/TRAINING PROGRAM

## 2024-10-23 PROCEDURE — 1160F RVW MEDS BY RX/DR IN RCRD: CPT | Mod: CPTII,,, | Performed by: STUDENT IN AN ORGANIZED HEALTH CARE EDUCATION/TRAINING PROGRAM

## 2024-10-23 PROCEDURE — 99999PBSHW PR PBB SHADOW TECHNICAL ONLY FILED TO HB: Mod: PBBFAC,,,

## 2024-10-23 PROCEDURE — 73521 X-RAY EXAM HIPS BI 2 VIEWS: CPT | Mod: 26,,, | Performed by: RADIOLOGY

## 2024-10-23 PROCEDURE — 73521 X-RAY EXAM HIPS BI 2 VIEWS: CPT | Mod: TC

## 2024-10-23 PROCEDURE — 1159F MED LIST DOCD IN RCRD: CPT | Mod: CPTII,,, | Performed by: STUDENT IN AN ORGANIZED HEALTH CARE EDUCATION/TRAINING PROGRAM

## 2024-10-23 PROCEDURE — 99999 PR PBB SHADOW E&M-EST. PATIENT-LVL III: CPT | Mod: PBBFAC,,, | Performed by: STUDENT IN AN ORGANIZED HEALTH CARE EDUCATION/TRAINING PROGRAM

## 2024-10-23 PROCEDURE — 99214 OFFICE O/P EST MOD 30 MIN: CPT | Mod: 25,S$PBB,, | Performed by: STUDENT IN AN ORGANIZED HEALTH CARE EDUCATION/TRAINING PROGRAM

## 2024-10-23 PROCEDURE — 20611 DRAIN/INJ JOINT/BURSA W/US: CPT | Mod: 50,PBBFAC | Performed by: STUDENT IN AN ORGANIZED HEALTH CARE EDUCATION/TRAINING PROGRAM

## 2024-10-23 RX ORDER — TRIAMCINOLONE ACETONIDE 40 MG/ML
40 INJECTION, SUSPENSION INTRA-ARTICULAR; INTRAMUSCULAR
Status: DISCONTINUED | OUTPATIENT
Start: 2024-10-23 | End: 2024-10-23 | Stop reason: HOSPADM

## 2024-10-23 RX ADMIN — TRIAMCINOLONE ACETONIDE 40 MG: 200 INJECTION, SUSPENSION INTRA-ARTICULAR; INTRAMUSCULAR at 01:10

## 2024-10-23 NOTE — PROGRESS NOTES
Patient ID: Madhav Ruiz  YOB: 1965  MRN: [id number]    Chief Complaint: Pain of the Left Shoulder and Pain of the Right Shoulder      History of Present Illness: Madhav Ruiz is a left-hand dominant 59 y.o. male who presents today with bilateral shoulder pain.  Patient last seen in clinic for the left shoulder on 6/28/24  where he received a subacromial cortisone injection.  Reports that injection was successful until pain started to return at the end of September.  Pain is intermittent in nature and described as achy.  Rates pain today at a 8/10 and located in the anterior shoulder / upper arm region. Interested in repeating another CSI today if possible.       Occupation: construction      Past Medical History:   Past Medical History:   Diagnosis Date    Acid reflux     Edema of left lower extremity     Left knee    History of alcohol abuse      Past Surgical History:   Procedure Laterality Date    ESOPHAGOGASTRODUODENOSCOPY N/A 10/4/2023    Procedure: EGD (ESOPHAGOGASTRODUODENOSCOPY);  Surgeon: Polo Montaño MD;  Location: [facility] (2ND FLR);  Service: Gastroenterology;  Laterality: N/A;  referral: Jennifer Scheuermann, PA- possible banding / Hx varices past EGd at outside facility / Pt hx hematemesis- 2nd flr / prep ins on portal/ cirrhosis - labs - ERW  9/27-precall complete-MS    head procedure       No family history on file.  Social History     Socioeconomic History    Marital status:    Tobacco Use    Smoking status: Every Day     Current packs/day: 0.50     Types: Cigarettes    Smokeless tobacco: Never   Substance and Sexual Activity    Alcohol use: Yes     Alcohol/week: 20.0 standard drinks of alcohol     Types: 20 Shots of liquor per week    Drug use: Never    Sexual activity: Yes     Partners: Female     Social Drivers of Health     Financial Resource Strain: High Risk (7/25/2024)    Overall Financial Resource Strain (CARDIA)     Difficulty of Paying Living Expenses: Very  hard   Food Insecurity: Food Insecurity Present (7/25/2024)    Hunger Vital Sign     Worried About Running Out of Food in the Last Year: Often true     Ran Out of Food in the Last Year: Often true   Transportation Needs: No Transportation Needs (3/22/2024)    PRAPARE - Transportation     Lack of Transportation (Medical): No     Lack of Transportation (Non-Medical): No   Physical Activity: Sufficiently Active (7/25/2024)    Exercise Vital Sign     Days of Exercise per Week: 7 days     Minutes of Exercise per Session: 30 min   Stress: No Stress Concern Present (7/25/2024)    [employer] [place] [employer] - Occupational Stress Questionnaire     Feeling of Stress : Not at all   Housing Stability: High Risk (3/22/2024)    Housing Stability Vital Sign     Unable to Pay for Housing in the Last Year: Yes     Number of Places Lived in the Last Year: 2     Unstable Housing in the Last Year: Yes     Medication List with Changes/Refills   Current Medications    DICLOFENAC SODIUM (VOLTAREN) 1 % GEL    Apply 2 g topically 2 (two) times daily.    FLU VACC [id number] 6MOS UP,PF, (FLUARIX QUAD 6207-4612, PF,) 60 MCG (15 MCG X 4)/0.5 ML SYRG    use as directed    FUROSEMIDE (LASIX) 20 MG TABLET    TAKE 2 TABLETS BY MOUTH ONCE DAILY    GABAPENTIN (NEURONTIN) 300 MG CAPSULE    Take 1 capsule (300 mg total) by mouth 2 (two) times daily.    IBUPROFEN (ADVIL,MOTRIN) 600 MG TABLET    Take 600 mg by mouth every 6 (six) hours.    LACTULOSE (CHRONULAC) 20 GRAM/30 ML SOLN    Take 30 mLs (20 g total) by mouth once daily.    MELOXICAM (MOBIC) 15 MG TABLET    Take 1 tablet (15 mg total) by mouth once daily. Take 1 tab daily for 14 days, then daily as needed.    METHOCARBAMOL (ROBAXIN) 500 MG TAB    Take 1 tablet (500 mg total) by mouth 4 (four) times daily as needed (muscle spasms).    NALTREXONE (DEPADE) 50 MG TABLET    Take 50 mg by mouth every morning.    PANTOPRAZOLE (PROTONIX) 40 MG TABLET    TAKE ONE TABLET BY MOUTH ONE TIME DAILY     SPIRONOLACTONE (ALDACTONE) 50 MG TABLET    TAKE 2 TABLETS BY MOUTH ONCE DAILY    SULINDAC (CLINORIL) 200 MG TAB    Take 1 tablet (200 mg total) by mouth 2 (two) times daily as needed (Pain).    TRAZODONE (DESYREL) 100 MG TABLET    Take 100 mg by mouth every evening.     Review of patient's allergies indicates:  No Known Allergies    Physical Exam:   Body mass index is 26.38 kg/m².    GENERAL: Well appearing, in no acute distress.  HEAD: Normocephalic and atraumatic.  ENT: External ears and nose grossly normal.  EYES: EOMI bilaterally  PULMONARY: Respirations are grossly even and non-labored.  NEURO: Awake, alert, and oriented x 3.  SKIN: No obvious rashes appreciated.  PSYCH: Mood & affect are appropriate.    Detailed MSK exam:     Left shoulder exam:   -ROM: abduction 120, forward flexion 120, external rotation 80, internal rotation 70  -empty can test pain but no weakness, resisted ER pain but no weakness, belly press negative  -moralez test negative, neers test negative, whipple test positive  -biceps load test negative, yerguson test negative, Perkins's test negative  -sensation intact, pulses 2+  -TTP: anterior, lateral cuff insertion, and posterior    Right shoulder exam:   -ROM: abduction 120, forward flexion 120, external rotation 80, internal rotation 70  -empty can test pain but no weakness, resisted ER negative, belly press negative  -moralez test negative, neers test negative, whipple test positive  -biceps load test negative, yerguson test negative, Perkins's test negative  -sensation intact, pulses 2+  -TTP: anterior      Imaging:  US Abdomen Limited  Narrative: EXAMINATION:  US ABDOMEN LIMITED    CLINICAL HISTORY:  Unspecified cirrhosis of liver    TECHNIQUE:  Limited ultrasound of the right upper quadrant of the abdomen (including pancreas, liver, gallbladder, common bile duct, IVC and spleen) was performed.    COMPARISON:  Limited abdominal ultrasound 01/12/2024    FINDINGS:  Pancreas: Visualized  portions are unremarkable.    Liver: Normal in size, measuring 16.2 cm. Nodular contour with diffuse parenchymal heterogeneity suggesting cirrhosis. There is a 1.6 cm right hepatic simple cyst.  Portal vein demonstrates appropriate direction of flow and waveform.    Gallbladder: Multiple gallstones are seen measuring up to 9 mm.  There is no gallbladder wall thickening or pericholecystic fluid.  No sonographic Ortiz's sign.    Biliary system: The common duct is dilated, measuring 10 mm.  No intrahepatic ductal dilatation.    Spleen: Borderline enlarged, measuring 13.0 cm.    Miscellaneous: No upper abdominal ascites.  Visualized IVC is normal.  Impression: Cirrhotic hepatic morphology with simple hepatic cyst.  No concerning hepatic lesion.    Cholelithiasis without evidence of acute cholecystitis.    Persistent extrahepatic biliary ductal dilatation.  Correlate with laboratory values.  Consider MRCP for further evaluation.    Electronically signed by: Angela Momin  Date:    07/30/2024  Time:    11:22        Relevant imaging results were reviewed and interpreted by me and per my read shows mild AC arthritic changes bilaterally.  This was discussed with the patient and / or family today.     Assessment:  Madhav Ruiz is a 59 y.o. male following up for bilateral shoulder pain. Interested in repeat steroid injections today.   Plan: Steroid injection given today (see separate procedure note for details). We discussed the proper protocols after the injection such as no submerging pools, baths tubs, or hot tubs for 24 hr.  Showering is okay today.  We also discussed that blood sugars can be elevated after an injection and asked patient to properly checked her sugars over the next few days and contact their PCP if there are any concerns.  We discussed red flags such as fevers, chills, red, warm, tender joint at the area of injection to please seek medical care immediately.   Continue conservative management for pain.    Follow up as needed. All questions answered.     Tendinopathy of left rotator cuff  -     Sports Medicine US - Guidance for Needle Placement  -     Large Joint Aspiration/Injection: bilateral subacromial bursa    Tendinopathy of right rotator cuff  -     Sports Medicine US - Guidance for Needle Placement  -     Large Joint Aspiration/Injection: bilateral subacromial bursa         Ultrasound guidance was used for needle localization. Images were saved and stored for documentation. The appropriate structures were visualized. Dynamic visualization of the needle was continuous throughout the procedures and maintained good position.      Electronically signed:  Noah Rae MD, MPH  10/23/2024  2:02 PM

## 2024-10-23 NOTE — PROCEDURES
Sports Medicine US - Guidance for Needle Placement    Date/Time: 10/23/2024 1:40 PM    Performed by: Noah Rae MD  Authorized by: Noah Rae MD  Preparation: Patient was prepped and draped in the usual sterile fashion.  Local anesthesia used: no    Anesthesia:  Local anesthesia used: no    Sedation:  Patient sedated: no    Patient tolerance: patient tolerated the procedure well with no immediate complications  Comments: Ultrasound guidance was used for needle localization. Images were saved and stored for documentation. The appropriate structures were visualized. Dynamic visualization of the needle was continuous throughout the procedures and maintained good position.

## 2024-10-23 NOTE — PATIENT INSTRUCTIONS
Assessment:  Madhav Ruiz is a 59 y.o. male   Chief Complaint   Patient presents with    Left Shoulder - Pain    Right Shoulder - Pain       Encounter Diagnoses   Name Primary?    Tendinopathy of left rotator cuff Yes    Tendinopathy of right rotator cuff         Plan:  Ultrasound guided subacromial cortisone injection to bilateral shoulders  We discussed the proper protocols after the injection such as no submerging pools, baths tubs, or hot tubs for 24 hr.  Showering is okay today.  We also discussed that blood sugars can be elevated after an injection and asked patient to properly checked her sugars over the next few days and contact their PCP if there are any concerns.  We discussed red flags such as fevers, chills, red, warm, tender joint at the area of injection to please seek medical care immediately.    Xrays for hip on way out  Follow up for hips    Follow-up: as needed.    Thank you for choosing [facility] and Dr. Noah Rae for your orthopedic & sports medicine care. It is our goal to provide you with exceptional care that will help keep you healthy, active, and get you back in the game.    Please do not hesitate to reach out to us via email, phone, or MyChart with any questions, concerns, or feedback.    If you felt that you received exemplary care today, please consider leaving us feedback on [employer] at:  [email or web address]    If you are experiencing pain/discomfort ,or have questions after 5pm and would like to be connected to the [facility] on-call team, please call this number and specify which Sports Medicine provider is treating you: [phone]

## 2024-10-23 NOTE — PROCEDURES
Large Joint Aspiration/Injection: bilateral subacromial bursa    Date/Time: 10/23/2024 1:40 PM    Performed by: Noah Rae MD  Authorized by: Noah Rae MD    Consent Done?:  Yes (Verbal)  Indications:  Pain  Site marked: the procedure site was marked    Timeout: prior to procedure the correct patient, procedure, and site was verified    Prep: patient was prepped and draped in usual sterile fashion    Local anesthetic:  Bupivacaine 0.5% without epinephrine and lidocaine 1% without epinephrine    Details:  Needle Size:  21 G  Ultrasonic Guidance for needle placement?: Yes    Images are saved and documented.  Approach:  Lateral  Location:  Shoulder  Laterality:  Bilateral  Site:  Bilateral subacromial bursa  Medications (Right):  40 mg triamcinolone acetonide 40 mg/mL  Medications (Left):  40 mg triamcinolone acetonide 40 mg/mL  Patient tolerance:  Patient tolerated the procedure well with no immediate complications     Ultrasound guidance was used for needle localization. Images were saved and stored for documentation. The appropriate structures were visualized. Dynamic visualization of the needle was continuous throughout the procedures and maintained good position.

## 2024-10-30 ENCOUNTER — OFFICE VISIT (OUTPATIENT)
Dept: SPORTS MEDICINE | Facility: CLINIC | Age: 59
End: 2024-10-30
Payer: MEDICAID

## 2024-10-30 VITALS — BODY MASS INDEX: 26.5 KG/M2 | HEIGHT: 73 IN | WEIGHT: 199.94 LBS

## 2024-10-30 DIAGNOSIS — M53.3 SACROILIAC JOINT PAIN: ICD-10-CM

## 2024-10-30 DIAGNOSIS — M16.0 PRIMARY OSTEOARTHRITIS OF BOTH HIPS: Primary | ICD-10-CM

## 2024-10-30 DIAGNOSIS — M51.369 DEGENERATION OF INTERVERTEBRAL DISC OF LUMBAR REGION, UNSPECIFIED WHETHER PAIN PRESENT: ICD-10-CM

## 2024-10-30 PROCEDURE — 99213 OFFICE O/P EST LOW 20 MIN: CPT | Mod: PBBFAC | Performed by: STUDENT IN AN ORGANIZED HEALTH CARE EDUCATION/TRAINING PROGRAM

## 2024-10-30 PROCEDURE — 99214 OFFICE O/P EST MOD 30 MIN: CPT | Mod: S$PBB,,, | Performed by: STUDENT IN AN ORGANIZED HEALTH CARE EDUCATION/TRAINING PROGRAM

## 2024-10-30 PROCEDURE — 3008F BODY MASS INDEX DOCD: CPT | Mod: CPTII,,, | Performed by: STUDENT IN AN ORGANIZED HEALTH CARE EDUCATION/TRAINING PROGRAM

## 2024-10-30 PROCEDURE — 1159F MED LIST DOCD IN RCRD: CPT | Mod: CPTII,,, | Performed by: STUDENT IN AN ORGANIZED HEALTH CARE EDUCATION/TRAINING PROGRAM

## 2024-10-30 PROCEDURE — 99999 PR PBB SHADOW E&M-EST. PATIENT-LVL III: CPT | Mod: PBBFAC,,, | Performed by: STUDENT IN AN ORGANIZED HEALTH CARE EDUCATION/TRAINING PROGRAM

## 2024-10-30 PROCEDURE — G2211 COMPLEX E/M VISIT ADD ON: HCPCS | Mod: S$PBB,,, | Performed by: STUDENT IN AN ORGANIZED HEALTH CARE EDUCATION/TRAINING PROGRAM

## 2024-10-30 PROCEDURE — 1160F RVW MEDS BY RX/DR IN RCRD: CPT | Mod: CPTII,,, | Performed by: STUDENT IN AN ORGANIZED HEALTH CARE EDUCATION/TRAINING PROGRAM

## 2024-11-04 ENCOUNTER — TELEPHONE (OUTPATIENT)
Dept: HEPATOLOGY | Facility: CLINIC | Age: 59
End: 2024-11-04
Payer: MEDICAID

## 2024-11-04 NOTE — TELEPHONE ENCOUNTER
Patient unable to keep scheduled virtual visit appt with PA Scheuermann on 11/1/24.  Letter sent asking that he contact hepatology for rescheduling.

## 2024-12-03 ENCOUNTER — TELEPHONE (OUTPATIENT)
Dept: PAIN MEDICINE | Facility: CLINIC | Age: 59
End: 2024-12-03
Payer: MEDICAID

## 2024-12-27 ENCOUNTER — TELEPHONE (OUTPATIENT)
Dept: INTERNAL MEDICINE | Facility: CLINIC | Age: 59
End: 2024-12-27
Payer: MEDICAID

## 2024-12-27 ENCOUNTER — TELEPHONE (OUTPATIENT)
Dept: SPORTS MEDICINE | Facility: CLINIC | Age: 59
End: 2024-12-27
Payer: MEDICAID

## 2024-12-27 NOTE — TELEPHONE ENCOUNTER
----- Message from Nurse Juarez sent at 12/27/2024 11:40 AM CST -----  Contact: pt    ----- Message -----  From: Farideh Rizo  Sent: 12/27/2024  11:32 AM CST  To: Kenny Juarez Staff    Type:  Same Day Appointment Request    Caller is requesting a same day appointment.  Caller declined first available appointment listed below.    Name of Caller: pt  When is the first available appointment? No solutions  Symptoms: ongoing left hand pain  Best Call Back Number: [phone]  Additional Information:

## 2024-12-27 NOTE — TELEPHONE ENCOUNTER
----- Message from Eboni sent at 12/27/2024  9:47 AM CST -----  Contact: Alana (spouse)  Type:  Same Day Appointment Request    Caller is requesting a same day appointment.  Caller declined first available appointment listed below.    Name of Caller:Alana (spouse)   When is the first available appointment?non populated   Symptoms: pt has a growing knot on chest along with other knots and pt wants to do an annual   Best Call Back Number:[phone]    Additional Information:

## 2024-12-30 ENCOUNTER — HOSPITAL ENCOUNTER (OUTPATIENT)
Dept: RADIOLOGY | Facility: HOSPITAL | Age: 59
Discharge: HOME | End: 2024-12-30
Attending: ORTHOPAEDIC SURGERY
Payer: MEDICAID

## 2024-12-30 ENCOUNTER — OFFICE VISIT (OUTPATIENT)
Dept: ORTHOPEDICS | Facility: CLINIC | Age: 59
End: 2024-12-30
Payer: MEDICAID

## 2024-12-30 VITALS — WEIGHT: 199.94 LBS | BODY MASS INDEX: 26.5 KG/M2 | HEIGHT: 73 IN

## 2024-12-30 DIAGNOSIS — M51.372 DEGENERATION OF INTERVERTEBRAL DISC OF LUMBOSACRAL REGION WITH DISCOGENIC BACK PAIN AND LOWER EXTREMITY PAIN: Primary | ICD-10-CM

## 2024-12-30 DIAGNOSIS — M54.16 LUMBAR RADICULOPATHY: ICD-10-CM

## 2024-12-30 DIAGNOSIS — M47.817 FACET ARTHRITIS OF LUMBOSACRAL REGION: ICD-10-CM

## 2024-12-30 PROCEDURE — 99999 PR PBB SHADOW E&M-EST. PATIENT-LVL III: CPT | Mod: PBBFAC,,, | Performed by: ORTHOPAEDIC SURGERY

## 2024-12-30 PROCEDURE — 1160F RVW MEDS BY RX/DR IN RCRD: CPT | Mod: CPTII,,, | Performed by: ORTHOPAEDIC SURGERY

## 2024-12-30 PROCEDURE — 3008F BODY MASS INDEX DOCD: CPT | Mod: CPTII,,, | Performed by: ORTHOPAEDIC SURGERY

## 2024-12-30 PROCEDURE — 72100 X-RAY EXAM L-S SPINE 2/3 VWS: CPT | Mod: 26,,, | Performed by: RADIOLOGY

## 2024-12-30 PROCEDURE — 72100 X-RAY EXAM L-S SPINE 2/3 VWS: CPT | Mod: TC,PN

## 2024-12-30 PROCEDURE — 99214 OFFICE O/P EST MOD 30 MIN: CPT | Mod: S$PBB,,, | Performed by: ORTHOPAEDIC SURGERY

## 2024-12-30 PROCEDURE — 1159F MED LIST DOCD IN RCRD: CPT | Mod: CPTII,,, | Performed by: ORTHOPAEDIC SURGERY

## 2024-12-30 PROCEDURE — 99213 OFFICE O/P EST LOW 20 MIN: CPT | Mod: PBBFAC,25,PN | Performed by: ORTHOPAEDIC SURGERY

## 2024-12-30 RX ORDER — DICLOFENAC POTASSIUM 50 MG/1
50 TABLET, FILM COATED ORAL 2 TIMES DAILY
Qty: 60 TABLET | Refills: 0 | Status: SHIPPED | OUTPATIENT
Start: 2024-12-30 | End: 2025-01-29

## 2024-12-30 NOTE — PROGRESS NOTES
Subjective:       Patient ID: Madhav Ruiz is a 59 y.o. male.    Chief Complaint: Pain of the Spine (Lower lumbar; pain comes and goes when it does come pt states he cannot walk, aching/throbbing pain, radiates to both hips states he doesn't have strength all the way down )      History of Present Illness    Prior to meeting with the patient I reviewed the medical chart in [facility]. This included reviewing the previous progress notes from our office, review of the patient's last appointment with their primary care provider, review of any visits to the emergency room, and review of any pain management appointments or procedures.   Eight year history of chronic low back pain with radiation to his legs has never had any treatment symptoms on a daily basis worse with activity.  No bowel or bladder incontinence.  Has tried meloxicam as well as Voltaren gel neither which was successful or helpful    Current Medications  Current Outpatient Medications   Medication Sig Dispense Refill    diclofenac sodium (VOLTAREN) 1 % Gel Apply 2 g topically 2 (two) times daily. 20 g 0    flu vacc [id number] 6mos up,PF, (FLUARIX QUAD 7557-1576, PF,) 60 mcg (15 mcg x 4)/0.5 mL Syrg use as directed 0.5 mL 0    ibuprofen (ADVIL,MOTRIN) 600 MG tablet Take 600 mg by mouth every 6 (six) hours.      lactulose (CHRONULAC) 20 gram/30 mL Soln Take 30 mLs (20 g total) by mouth once daily. 900 mL 3    spironolactone (ALDACTONE) 50 MG tablet TAKE 2 TABLETS BY MOUTH ONCE DAILY 60 tablet 1    sulindac (CLINORIL) 200 MG Tab Take 1 tablet (200 mg total) by mouth 2 (two) times daily as needed (Pain). 60 tablet 3    traZODone (DESYREL) 100 MG tablet Take 100 mg by mouth every evening.      furosemide (LASIX) 20 MG tablet TAKE 2 TABLETS BY MOUTH ONCE DAILY (Patient not taking: Reported on 12/30/2024) 60 tablet 1    gabapentin (NEURONTIN) 300 MG capsule Take 1 capsule (300 mg total) by mouth 2 (two) times daily. (Patient not taking: Reported on 12/30/2024) 60  capsule 11    meloxicam (MOBIC) 15 MG tablet Take 1 tablet (15 mg total) by mouth once daily. Take 1 tab daily for 14 days, then daily as needed. (Patient not taking: Reported on 12/30/2024) 30 tablet 1    methocarbamoL (ROBAXIN) 500 MG Tab Take 1 tablet (500 mg total) by mouth 4 (four) times daily as needed (muscle spasms). (Patient not taking: Reported on 12/30/2024) 20 tablet 0    naltrexone (DEPADE) 50 mg tablet Take 50 mg by mouth every morning. (Patient not taking: Reported on 12/30/2024)      pantoprazole (PROTONIX) 40 MG tablet TAKE ONE TABLET BY MOUTH ONE TIME DAILY (Patient not taking: Reported on 12/30/2024) 30 tablet 11     No current facility-administered medications for this visit.       Allergies  Review of patient's allergies indicates:  No Known Allergies    Past Medical History  Past Medical History:   Diagnosis Date    Acid reflux     Edema of left lower extremity     Left knee    History of alcohol abuse        Surgical History  Past Surgical History:   Procedure Laterality Date    ESOPHAGOGASTRODUODENOSCOPY N/A 10/4/2023    Procedure: EGD (ESOPHAGOGASTRODUODENOSCOPY);  Surgeon: Polo Montaño MD;  Location: [facility] (2ND FLR);  Service: Gastroenterology;  Laterality: N/A;  referral: Jennifer Scheuermann, PA- possible banding / Hx varices past EGd at outside facility / Pt hx hematemesis- 2nd flr / prep ins on portal/ cirrhosis - labs - ERW  9/27-precall complete-MS    head procedure         Family History:   No family history on file.    Social History:   Social History     Socioeconomic History    Marital status:    Tobacco Use    Smoking status: Every Day     Current packs/day: 0.50     Types: Cigarettes    Smokeless tobacco: Never   Substance and Sexual Activity    Alcohol use: Yes     Alcohol/week: 20.0 standard drinks of alcohol     Types: 20 Shots of liquor per week    Drug use: Never    Sexual activity: Yes     Partners: Female     Social Drivers of Health     Financial Resource  Strain: High Risk (7/25/2024)    Overall Financial Resource Strain (CARDIA)     Difficulty of Paying Living Expenses: Very hard   Food Insecurity: Food Insecurity Present (7/25/2024)    Hunger Vital Sign     Worried About Running Out of Food in the Last Year: Often true     Ran Out of Food in the Last Year: Often true   Transportation Needs: No Transportation Needs (3/22/2024)    PRAPARE - Transportation     Lack of Transportation (Medical): No     Lack of Transportation (Non-Medical): No   Physical Activity: Sufficiently Active (7/25/2024)    Exercise Vital Sign     Days of Exercise per Week: 7 days     Minutes of Exercise per Session: 30 min   Stress: No Stress Concern Present (7/25/2024)    [employer] [place] [employer] - Occupational Stress Questionnaire     Feeling of Stress : Not at all   Housing Stability: High Risk (3/22/2024)    Housing Stability Vital Sign     Unable to Pay for Housing in the Last Year: Yes     Number of Places Lived in the Last Year: 2     Unstable Housing in the Last Year: Yes       Hospitalization/Major Diagnostic Procedure:     Review of Systems     General/Constitutional:  Chills denies. Fatigue denies. Fever denies. Weight gain denies. Weight loss denies.    Respiratory:  Shortness of breath denies.    Cardiovascular:  Chest pain denies.    Gastrointestinal:  Constipation denies. Diarrhea denies. Nausea denies. Vomiting denies.     Hematology:  Easy bruising denies. Prolonged bleeding denies.     Genitourinary:  Frequent urination denies. Pain in lower back denies. Painful urination denies.     Musculoskeletal:  See HPI for details    Skin:  Rash denies.    Neurologic:  Dizziness denies. Gait abnormalities denies. Seizures denies. Tingling/Numbess denies.    Psychiatric:  Anxiety denies. Depressed mood denies.     Objective:   Vital Signs: There were no vitals filed for this visit.     Physical Exam      General Examination:     Constitutional: The patient is alert and  oriented to lace person and time. Mood is pleasant.     Head/Face: Normal facial features normal eyebrows    Eyes: Normal extraocular motion bilaterally    Lungs: Respirations are equal and unlabored    Gait is coordinated.    Cardiovascular: There are no swelling or varicosities present.    Lymphatic: Negative for adenopathy    Skin: Normal    Neurological: Level of consciousness normal. Oriented to place person and time and situation    Psychiatric: Oriented to time place person and situation    Patient can not stand fully erect moderate tenderness L4-S1 midline over the paraspinous muscles pain with facet joint low extension -10 degrees flexion 60° bending 10° all with endpoint pain hip and knee range of motion intact straight leg raising causes back pain range of motion of both hips intact no edema adenopathy varicosities motor function grossly normal all major muscle groups both lower extremities.    XRAY Report/ Interpretation :  AP and lateral x-rays of lumbar spine will performed today. Indications low back pain. Findings:  Minimal narrowing of the L5-S1 disc space multilevel facet joint sclerosis lower lumbar spine diffusely      Assessment:       1. Degeneration of intervertebral disc of lumbosacral region with discogenic back pain and lower extremity pain    2. Facet arthritis of lumbosacral region    3. Lumbar radiculopathy        Plan:       Madhav was seen today for pain.    Diagnoses and all orders for this visit:    Degeneration of intervertebral disc of lumbosacral region with discogenic back pain and lower extremity pain  -     X-Ray Lumbar Spine Ap And Lateral  -     Ambulatory Referral/Consult to Physical Therapy; Future    Facet arthritis of lumbosacral region  -     Ambulatory Referral/Consult to Physical Therapy; Future    Lumbar radiculopathy  -     Ambulatory Referral/Consult to Physical Therapy; Future         Follow up for 6 week f/u - lumbar pain, PT f/u.    Treatment options were  discussed.  Patient referred for outpatient physical therapy lumbar spine for 6 weeks.  Prescription given for diclofenac potassium.  Side effects and complications of medicine discussed.  Return 6 weeks if not improved consider lumbar MRI      Treatment options were discussed with regards to the nature of the medical condition. Conservative pain intervention and surgical options were discussed in detail. The probability of success of each separate treatment option was discussed. The patient expressed a clear understanding of the treatment options. With regards to surgery, the procedure risk, benefits, complications, and outcomes were discussed. No guarantees were given with regards to surgical outcome.   The risk of complications, morbidity, and mortality of patient management decisions have been made at the time of this visit. These are associated with the patient's problems, diagnostic procedures and treatment options. This includes the possible management options selected and those considered but not selected by the patient after shared medical decision making we discussed with the patient.     This note was created using Dragon voice recognition software that occasionally misinterpreted phrases or words.

## 2025-02-06 DIAGNOSIS — M79.642 LEFT HAND PAIN: Primary | ICD-10-CM

## 2025-02-10 ENCOUNTER — TELEPHONE (OUTPATIENT)
Dept: ORTHOPEDICS | Facility: CLINIC | Age: 60
End: 2025-02-10
Payer: MEDICAID

## 2025-02-12 ENCOUNTER — TELEPHONE (OUTPATIENT)
Dept: ORTHOPEDICS | Facility: CLINIC | Age: 60
End: 2025-02-12
Payer: MEDICAID

## 2025-02-17 ENCOUNTER — TELEPHONE (OUTPATIENT)
Dept: ORTHOPEDICS | Facility: CLINIC | Age: 60
End: 2025-02-17
Payer: MEDICAID

## 2025-02-17 NOTE — TELEPHONE ENCOUNTER
Attempted to return the patient s phone call there was no answer so I left a voicemail.     ----- Message from Ani sent at 2/17/2025  3:07 PM CST -----  Contact: Alana, Wife  ..Type:  Patient Requesting CallWho Called: Alana, Wife Does the patient know what this is regarding?: rescheduling cancelled appt Would the patient rather a call back or a response via [facility]? Call Best Call Back Number: .[phone] (home) Additional Information:

## 2025-02-24 ENCOUNTER — OFFICE VISIT (OUTPATIENT)
Dept: ORTHOPEDICS | Facility: CLINIC | Age: 60
End: 2025-02-24
Payer: MEDICAID

## 2025-02-24 ENCOUNTER — HOSPITAL ENCOUNTER (OUTPATIENT)
Dept: RADIOLOGY | Facility: HOSPITAL | Age: 60
Discharge: HOME | End: 2025-02-24
Attending: ORTHOPAEDIC SURGERY
Payer: MEDICAID

## 2025-02-24 VITALS — HEIGHT: 73 IN | BODY MASS INDEX: 26.5 KG/M2 | WEIGHT: 199.94 LBS

## 2025-02-24 DIAGNOSIS — M79.642 LEFT HAND PAIN: ICD-10-CM

## 2025-02-24 DIAGNOSIS — S62.343D CLOSED NONDISPLACED FRACTURE OF BASE OF THIRD METACARPAL BONE OF LEFT HAND WITH ROUTINE HEALING, SUBSEQUENT ENCOUNTER: Primary | ICD-10-CM

## 2025-02-24 DIAGNOSIS — M25.561 PAIN IN BOTH KNEES, UNSPECIFIED CHRONICITY: Primary | ICD-10-CM

## 2025-02-24 DIAGNOSIS — M25.562 PAIN IN BOTH KNEES, UNSPECIFIED CHRONICITY: Primary | ICD-10-CM

## 2025-02-24 PROCEDURE — 99213 OFFICE O/P EST LOW 20 MIN: CPT | Mod: S$PBB,,, | Performed by: ORTHOPAEDIC SURGERY

## 2025-02-24 PROCEDURE — 1159F MED LIST DOCD IN RCRD: CPT | Mod: CPTII,,, | Performed by: ORTHOPAEDIC SURGERY

## 2025-02-24 PROCEDURE — 73130 X-RAY EXAM OF HAND: CPT | Mod: 26,LT,, | Performed by: RADIOLOGY

## 2025-02-24 PROCEDURE — 99213 OFFICE O/P EST LOW 20 MIN: CPT | Mod: PBBFAC,25 | Performed by: ORTHOPAEDIC SURGERY

## 2025-02-24 PROCEDURE — 99999 PR PBB SHADOW E&M-EST. PATIENT-LVL III: CPT | Mod: PBBFAC,,, | Performed by: ORTHOPAEDIC SURGERY

## 2025-02-24 PROCEDURE — 3008F BODY MASS INDEX DOCD: CPT | Mod: CPTII,,, | Performed by: ORTHOPAEDIC SURGERY

## 2025-02-24 PROCEDURE — 1160F RVW MEDS BY RX/DR IN RCRD: CPT | Mod: CPTII,,, | Performed by: ORTHOPAEDIC SURGERY

## 2025-02-24 PROCEDURE — 73130 X-RAY EXAM OF HAND: CPT | Mod: TC,LT

## 2025-02-24 NOTE — PROGRESS NOTES
Subjective:     Patient ID: Madhav Ruiz is a 59 y.o. male.    Chief Complaint: Pain and Injury of the Left Hand      HPI:  The patient is a 59-year-old male status post left 3rd metacarpal base fracture 04/28/2024.  Currently he is asymptomatic and doing well.    Past Medical History:   Diagnosis Date    Acid reflux     Edema of left lower extremity     Left knee    History of alcohol abuse      Past Surgical History:   Procedure Laterality Date    ESOPHAGOGASTRODUODENOSCOPY N/A 10/4/2023    Procedure: EGD (ESOPHAGOGASTRODUODENOSCOPY);  Surgeon: Polo Montaño MD;  Location: [facility] (2ND FLR);  Service: Gastroenterology;  Laterality: N/A;  referral: Jennifer Scheuermann, PA- possible banding / Hx varices past EGd at outside facility / Pt hx hematemesis- 2nd flr / prep ins on portal/ cirrhosis - labs - ERW  9/27-precall complete-MS    head procedure       No family history on file.  Social History[1]  Medication List with Changes/Refills   Current Medications    FLU VACC [id number] 6MOS UP,PF, (FLUARIX QUAD 0167-1220, PF,) 60 MCG (15 MCG X 4)/0.5 ML SYRG    use as directed    FUROSEMIDE (LASIX) 20 MG TABLET    TAKE 2 TABLETS BY MOUTH ONCE DAILY    GABAPENTIN (NEURONTIN) 300 MG CAPSULE    Take 1 capsule (300 mg total) by mouth 2 (two) times daily.    LACTULOSE (CHRONULAC) 20 GRAM/30 ML SOLN    Take 30 mLs (20 g total) by mouth once daily.    METHOCARBAMOL (ROBAXIN) 500 MG TAB    Take 1 tablet (500 mg total) by mouth 4 (four) times daily as needed (muscle spasms).    NALTREXONE (DEPADE) 50 MG TABLET    Take 50 mg by mouth every morning.    PANTOPRAZOLE (PROTONIX) 40 MG TABLET    TAKE ONE TABLET BY MOUTH ONE TIME DAILY    SPIRONOLACTONE (ALDACTONE) 50 MG TABLET    TAKE 2 TABLETS BY MOUTH ONCE DAILY    SULINDAC (CLINORIL) 200 MG TAB    Take 1 tablet (200 mg total) by mouth 2 (two) times daily as needed (Pain).    TRAZODONE (DESYREL) 100 MG TABLET    Take 100 mg by mouth every evening.     Review of patient's allergies  indicates:  No Known Allergies  Review of Systems   Constitutional: Negative for malaise/fatigue.   HENT:  Negative for hearing loss.    Eyes:  Negative for double vision and visual disturbance.   Cardiovascular:  Negative for chest pain.   Respiratory:  Negative for shortness of breath.    Endocrine: Negative for cold intolerance.   Hematologic/Lymphatic: Does not bruise/bleed easily.   Skin:  Negative for poor wound healing and suspicious lesions.   Musculoskeletal:  Positive for arthritis, joint pain and joint swelling. Negative for gout.   Gastrointestinal:  Negative for nausea and vomiting.   Genitourinary:  Negative for dysuria.   Neurological:  Positive for weakness. Negative for numbness, paresthesias and sensory change.   Psychiatric/Behavioral:  Positive for substance abuse. Negative for depression and memory loss. The patient is not nervous/anxious.    Allergic/Immunologic: Negative for persistent infections.       Objective:   Body mass index is 26.38 kg/m².  There were no vitals filed for this visit.             General    Constitutional: He is oriented to person, place, and time. He appears well-developed and well-nourished. No distress.   HENT:   Head: Normocephalic.   Eyes: EOM are normal.   Pulmonary/Chest: Effort normal.   Neurological: He is oriented to person, place, and time.   Psychiatric: He has a normal mood and affect.         Left Hand/Wrist Exam   Left hand exam is normal.            Relevant imaging results reviewed and interpreted by me, discussed with the patient and / or family today radiographs left hand showed anatomic position incomplete healing left 3rd metacarpal fracture.  He does have some arthritic change in the radiolunate articulation.  Assessment:     Encounter Diagnosis   Name Primary?    Closed nondisplaced fracture of base of third metacarpal bone of left hand with routine healing, subsequent encounter Yes        Plan:       The patient was given a release at his request.   He will return on a as needed basis he seems to be doing well              Disclaimer: This note was prepared using a voice recognition system and is likely to have sound alike errors within the text.          [1]   Social History  Socioeconomic History    Marital status:    Tobacco Use    Smoking status: Every Day     Current packs/day: 0.50     Types: Cigarettes    Smokeless tobacco: Never   Substance and Sexual Activity    Alcohol use: Yes     Alcohol/week: 20.0 standard drinks of alcohol     Types: 20 Shots of liquor per week    Drug use: Never    Sexual activity: Yes     Partners: Female     Social Drivers of Health     Financial Resource Strain: High Risk (7/25/2024)    Overall Financial Resource Strain (CARDIA)     Difficulty of Paying Living Expenses: Very hard   Food Insecurity: Food Insecurity Present (7/25/2024)    Hunger Vital Sign     Worried About Running Out of Food in the Last Year: Often true     Ran Out of Food in the Last Year: Often true   Transportation Needs: No Transportation Needs (3/22/2024)    PRAPARE - Transportation     Lack of Transportation (Medical): No     Lack of Transportation (Non-Medical): No   Physical Activity: Sufficiently Active (7/25/2024)    Exercise Vital Sign     Days of Exercise per Week: 7 days     Minutes of Exercise per Session: 30 min   Stress: No Stress Concern Present (7/25/2024)    [place] [employer] - Occupational Stress Questionnaire     Feeling of Stress : Not at all   Housing Stability: High Risk (3/22/2024)    Housing Stability Vital Sign     Unable to Pay for Housing in the Last Year: Yes     Number of Places Lived in the Last Year: 2     Unstable Housing in the Last Year: Yes

## 2025-02-25 ENCOUNTER — CLINICAL SUPPORT (OUTPATIENT)
Dept: REHABILITATION | Facility: HOSPITAL | Age: 60
End: 2025-02-25
Payer: MEDICAID

## 2025-02-25 DIAGNOSIS — M25.552 BILATERAL HIP PAIN: ICD-10-CM

## 2025-02-25 DIAGNOSIS — M54.50 CHRONIC BILATERAL LOW BACK PAIN WITHOUT SCIATICA: Primary | ICD-10-CM

## 2025-02-25 DIAGNOSIS — Z74.09 DECREASED STRENGTH, ENDURANCE, AND MOBILITY: ICD-10-CM

## 2025-02-25 DIAGNOSIS — R68.89 DECREASED STRENGTH, ENDURANCE, AND MOBILITY: ICD-10-CM

## 2025-02-25 DIAGNOSIS — G89.29 CHRONIC BILATERAL LOW BACK PAIN WITHOUT SCIATICA: Primary | ICD-10-CM

## 2025-02-25 DIAGNOSIS — M53.86 DECREASED RANGE OF MOTION OF LUMBAR SPINE: ICD-10-CM

## 2025-02-25 DIAGNOSIS — R53.1 DECREASED STRENGTH, ENDURANCE, AND MOBILITY: ICD-10-CM

## 2025-02-25 DIAGNOSIS — M25.551 BILATERAL HIP PAIN: ICD-10-CM

## 2025-02-25 PROCEDURE — 97535 SELF CARE MNGMENT TRAINING: CPT | Performed by: PHYSICAL THERAPIST

## 2025-02-25 PROCEDURE — 97162 PT EVAL MOD COMPLEX 30 MIN: CPT | Performed by: PHYSICAL THERAPIST

## 2025-02-25 NOTE — PROGRESS NOTES
[facility]   Physical Therapy Initial Evaluation      Date: 2/25/2025   Name: Madhav Ruiz  Clinic Number: [id number]    Therapy Diagnosis:    Encounter Diagnoses   Name Primary?    Chronic bilateral low back pain without sciatica Yes    Bilateral hip pain     Decreased range of motion of lumbar spine     Decreased strength, endurance, and mobility       Physician: Marvin Nunes MD     Physician Orders: PT Eval and Treat  Medical Diagnosis from Referral: Degeneration of intervertebral disc of lumbosacral region with discogenic back pain and lower extremity pain [M51.372], Facet arthritis of lumbosacral region [M47.817], Lumbar radiculopathy [M54.16]   Evaluation Date: 2/25/2025  Authorization Period Expiration: 12/30/2025  Plan of Care Expiration: 5/26/2025  Progress Note Due: 3/27/2025  Visit # / Visits authorized: 1/1   FOTO: 1/3 (last performed on 2/25/2025)    Precautions: Standard    Time In: 1310  Time Out: 1355  Total Billable Time (timed & untimed codes): 45 minutes    Subjective     History of current condition - MADHAV reports that he has been dealing with bilateral hip and low back pain for years now. Patient reports that he has had multiple cortisone injections to the hips. The injections help, but only for a little while. Patient reports that sometimes the pain is so bad he can hardly walk. Patient denies radicular s/s at this time.     Imaging: [x] Xray [] MRI [] CT: Performed on: 12/2024    Pain:  Current 8/10, worst 9/10, best 5/10   Location: [] Right   [] Left:  bilateral hip and low back pain  Description: aching  and sharp  Aggravating Factors: unsure  Easing Factors: activity avoidance, rest, heat    Prior Therapy:   [x] N/A    [] Yes:   Social History: Pt lives with their spouse  Occupation: Pt is not currently working   Prior Level of Function: Independent and pain free with all ADL, IADL, community mobility and functional activities.   Current Level of Function:  Independent with all ADL, IADL, community mobility and functional activities with reports of increased pain and need for increased time and frequent breaks.      Dominant Extremity:    [] Right    [x] Left    Pts goals: Pt reported goals are to decrease overall pain levels in order to return to prior functional level.     Medical History:   Past Medical History:   Diagnosis Date    Acid reflux     Edema of left lower extremity     Left knee    History of alcohol abuse        Surgical History:   Madhav Ruiz  has a past surgical history that includes head procedure and Esophagogastroduodenoscopy (N/A, 10/4/2023).    Medications:   Madhav has a current medication list which includes the following prescription(s): fluarix quad 5938-3946 (pf), furosemide, gabapentin, lactulose, methocarbamol, naltrexone, pantoprazole, spironolactone, sulindac, and trazodone.    Allergies:   Review of patient's allergies indicates:  No Known Allergies     Objective        RANGE OF MOTION:   Lumbar ROM Right  (spine) Left   Pain/Dysfunction with Movement Goal   Lumbar Flexion (60º) 50 ---  60   Lumbar Extension (30º) 3 ---  20   Lumbar Side Bending (25º) 12 10 Pain with both motions 20          STRENGTH:   L/E MMT Right Left Pain/Dysfunction with Movement Goal   Hip Flexion  4/5 4/5 No pain with MMT's today 4+/5 B   Hip Extension  3/5 3+/5  4+/5 B   Hip Abduction  4/5 4/5  4+/5 B   Knee Extension 4/5 4/5  5/5 B   Knee Flexion 4/5 4/5  5/5 B   Hip IR 4-/5 4-/5  4+/5 B   Hip ER 4-/5 4-/5  4+/5 B   Ankle DF 4-/5 4-/5  5/5 B   Ankle PF 4/5 4/5  5/5 B         MUSCLE LENGTH:   Muscle Tested  Right Left  Goal   Hip Flexors [] Normal  [x] Limited [] Normal  [x] Limited Normal B   Quadriceps [] Normal  [x] Limited [] Normal  [x] Limited Normal B   Hamstrings  [] Normal  [x] Limited [] Normal  [x] Limited Normal B   Piriformis  [] Normal  [x] Limited [] Normal  [x] Limited Normal B          JOINT MOBILITY:   Joint Motion  Right Mobility  (spine) Left  Mobility Goal   Thoracic PA  [x] Hypo     [] Normal     [] Hyper --- Normal   Lumbar PA [] Hypo     [x] Normal     [] Hyper --- Normal         SPECIAL TESTS:    Right  (spine) Left  Goal   SLUMP [] Positive     [x] Negative [] Positive     [x] Negative Negative B    Straight Leg Raise [] Positive     [x] Negative [] Positive     [x] Negative Negative B    ASIS Compression [] Positive     [x] Negative --- Negative    ASIS Distraction  [] Positive     [x] Negative --- Negative    SIVA [x] Positive     [] Negative [x] Positive     [] Negative Negative B           SENSATION  [x] Intact to Light Touch   [] Impaired:      PALPATION: Muscles: Increased tone and tenderness to palpation of: bilateral paraspinals, quadratus lumborum, glutes, piriformis, deep hip rotators.       POSTURE:  Pt presents with postural abnormalities which include:     [x] Forward Head                               [x] Increased Lumbar Lordosis              [x] Rounded Shoulder                        [] Genu Recurvatum              [] Increased Thoracic Kyphosis        [] Genu Valgus              [] Trunk Deviated                              [] Pes Planus              [] Scapular Winging                          [] Other:       GAIT ANALYSIS The patient ambulated with the following assistive device: none; the pt presents with the following gait abnormalities: bradykinetic, decreased step length bilateral, decreased heel strike bilateral, and decreased hip extension bilateral    FUNCTIONAL MOVEMENT PATTERNS  Movement Analysis Notes   Bed Mobility  []Functional  [x]Dysfunctional:  [x]Painful  []Non-Painful    Sit to Stand []Functional  [x]Dysfunctional:  [x]Painful  []Non-Painful        Function:     Intake Outcome Measure for FOTO Lumbar Spine Survey    Therapist reviewed FOTO scores for EMMA on 2/25/2025.   FOTO report - see Media section or FOTO account for episode details    Intake Score: 37%         Treatment     Total Treatment time  (time-based codes) separate from Evaluation: (15) minutes     MADHAV received the treatments listed below:      Self Care/Education to improve functional, at home performance for (15) minutes including:    Intervention Performed Today     Educated patient on the impairments present and the plan of care to address impairments x     Educated patient on the proper form and sequencing of all exercises provided in HEP today x Including recall demonstration from patient            Patient Education and Home Exercises     Education provided:   PURPOSE: Patient educated on the impairments noted above and the effects of physical therapy intervention to improve overall condition and QOL.   EXERCISE: Patient was educated on all the above exercise prior/during/after for proper posture, positioning, and execution for safe performance with home exercise program.   STRENGTH: Patient educated on the importance of improved core and extremity strength in order to improve alignment of the spine and extremities with static positions and dynamic movement.   GAIT & BALANCE: Patient educated on the importance of strong core and lower extremity musculature in order to improve both static and dynamic balance, improve gait mechanics, reduce fall risk and improve household and community mobility.   POSTURE: Patient educated on postural awareness to reduce stress and maintain optimal alignment of the spine with static positions and dynamic movement   TRANSFERS & TRANSITIONS: Patient educated on proper technique for bed mobility, transitions and transfers to improve body mechanics and decrease risk of injury.     Written Home Exercises Provided: yes.  Exercises were reviewed and MADHAV was able to demonstrate them prior to the end of the session.  MADHAV demonstrated good  understanding of the education provided. See EMR under Patient Instructions for exercises provided during therapy sessions.    Assessment     Madhav is a 59 y.o. male referred to  "outpatient Physical Therapy with a medical diagnosis of  Degeneration of intervertebral disc of lumbosacral region with discogenic back pain and lower extremity pain [M51.372], Facet arthritis of lumbosacral region [M47.817], Lumbar radiculopathy [M54.16] . Pt presents with impairments in the following categories: IMPAIRMENTS: ROM, strength, endurance, joint mobility, muscle length, posture, gait mechanics, core strength and stability, and functional movement patterns    Pt prognosis is Good  Pt will benefit from skilled outpatient Physical Therapy to address the deficits stated above and in the chart below, provide pt/family education, and to maximize pt's level of independence.     Plan of care discussed with patient: Yes  Pt's spiritual, cultural and educational needs considered and patient is agreeable to the plan of care and goals as stated below:     Anticipated Barriers for therapy: co-morbidities, chronicity of condition, and adherence to treatment plan    Medical Necessity is demonstrated by the following  History  Co-morbidities and personal factors that may impact the plan of care [] LOW: no personal factors / co-morbidities  [x] MODERATE: 1-2 personal factors / co-morbidities  [] HIGH: 3+ personal factors / co-morbidities    Moderate / High Support Documentation:   Past Medical History:   Diagnosis Date    Acid reflux     Edema of left lower extremity     Left knee    History of alcohol abuse         Examination  Body Structures and Functions, activity limitations and participation restrictions that may impact the plan of care [] LOW: addressing 1-2 elements  [x] MODERATE: 3+ elements  [] HIGH: 4+ elements (please support below)    Moderate / High Support Documentation: See above in "Current Level of Function"      Clinical Presentation [] LOW: stable  [x] MODERATE: Evolving  [] HIGH: Unstable     Decision Making/ Complexity Score: moderate         Short Term Goals:  3 weeks Status  Date Met   PAIN: Pt " will report worst pain of 7/10 in order to progress toward max functional ability and improve quality of life. [x] Progressing  [] Met  [] Not Met    MOBILITY: Patient will improve AROM to 50% of stated goals, listed in objective measures above, in order to progress towards independence with functional activities.  [x] Progressing  [] Met  [] Not Met    STRENGTH: Patient will improve strength to 50% of stated goals, listed in objective measures above, in order to progress towards independence with functional activities. [x] Progressing  [] Met  [] Not Met    POSTURE: Patient will correct postural deviations in sitting and standing, to decrease pain and promote long term stability.  [x] Progressing  [] Met  [] Not Met    GAIT: Patient will demonstrate improved gait mechanics in order to improve functional mobility, improve quality of life, and decrease risk of further injury or fall.  [x] Progressing  [] Met  [] Not Met    HEP: Patient will demonstrate independence with HEP in order to progress toward functional independence. [x] Progressing  [] Met  [] Not Met      Long Term Goals:  6 weeks Status Date Met   PAIN: Pt will report worst pain of 5/10 in order to progress toward max functional ability and improve quality of life [x] Progressing  [] Met  [] Not Met    FUNCTION: Patient will demonstrate improved function as indicated by a score of greater than or equal to 51 out of 100 on FOTO. [x] Progressing  [] Met  [] Not Met    MOBILITY: Patient will improve AROM to stated goals, listed in objective measures above, in order to return to maximal functional potential and improve quality of life.  [x] Progressing  [] Met  [] Not Met    STRENGTH: Patient will improve strength to stated goals, listed in objective measures above, in order to improve functional independence and quality of life.  [x] Progressing  [] Met  [] Not Met    GAIT: Patient will demonstrate normalized gait mechanics with minimal compensation in order  to return to PLOF. [x] Progressing  [] Met  [] Not Met    Patient will return to normal ADL's, IADL's, community involvement, recreational activities, and work-related activities with less than or equal to 3/10 pain and maximal function.  [x] Progressing  [] Met  [] Not Met      Plan     Plan of care Certification: 2/25/2025 to 5/26/2025.    Outpatient Physical Therapy 2 times weekly for 6 weeks to include any combination of the following interventions: virtual visits, dry needling, modalities, electrical stimulation (IFC, Pre-Mod, Attended with Functional Dry Needling), Aquatic Therapy, Cervical/Lumbar Traction, Gait Training, Manual Therapy, Neuromuscular Re-ed, Patient Education, Self Care, Therapeutic Exercise, and Therapeutic Activites     Beverly Ray, PT, DPT, DPT

## 2025-02-26 ENCOUNTER — CLINICAL SUPPORT (OUTPATIENT)
Dept: REHABILITATION | Facility: HOSPITAL | Age: 60
End: 2025-02-26
Payer: MEDICAID

## 2025-02-26 DIAGNOSIS — R53.1 DECREASED STRENGTH, ENDURANCE, AND MOBILITY: Primary | ICD-10-CM

## 2025-02-26 DIAGNOSIS — G89.29 CHRONIC BILATERAL LOW BACK PAIN WITHOUT SCIATICA: ICD-10-CM

## 2025-02-26 DIAGNOSIS — M53.86 DECREASED RANGE OF MOTION OF LUMBAR SPINE: ICD-10-CM

## 2025-02-26 DIAGNOSIS — R68.89 DECREASED STRENGTH, ENDURANCE, AND MOBILITY: Primary | ICD-10-CM

## 2025-02-26 DIAGNOSIS — M25.551 BILATERAL HIP PAIN: ICD-10-CM

## 2025-02-26 DIAGNOSIS — Z74.09 DECREASED STRENGTH, ENDURANCE, AND MOBILITY: Primary | ICD-10-CM

## 2025-02-26 DIAGNOSIS — M54.50 CHRONIC BILATERAL LOW BACK PAIN WITHOUT SCIATICA: ICD-10-CM

## 2025-02-26 DIAGNOSIS — M25.552 BILATERAL HIP PAIN: ICD-10-CM

## 2025-02-26 PROCEDURE — 97110 THERAPEUTIC EXERCISES: CPT | Performed by: PHYSICAL THERAPIST

## 2025-02-27 NOTE — PROGRESS NOTES
"  Outpatient Rehab    Physical Therapy Visit    Patient Name: EMMA Ruiz  MRN: [id number]  YOB: 1965  Encounter Date: 2/26/2025    Therapy Diagnosis:   Encounter Diagnoses   Name Primary?    Decreased strength, endurance, and mobility Yes    Chronic bilateral low back pain without sciatica     Bilateral hip pain     Decreased range of motion of lumbar spine      Physician: Marvin Nunes MD    Physician Orders: Eval and Treat  Medical Diagnosis: Degeneration of intervertebral disc of lumbosacral region with discogenic back pain and lower extremity pain [M51.372], Facet arthritis of lumbosacral region [M47.817], Lumbar radiculopathy [M54.16]     Visit # / Visits Authorized:  1 / 25 (+eval)   Date of Evaluation:  2/25/2025  Insurance Authorization Period: 12/31/2026  Plan of Care Certification:  5/26/2025      Time In: 1037   Time Out: 1130  Total Time: 53   Total Billable Time: 53    FOTO:  Intake Score:  %  Survey Score 1:  %  Survey Score 2:  %         Subjective   Patient reports feeling the same as yesterday, no new complaints..  Pain reported as 5/10.      Objective            Treatment:  ** (Billing reflects [place] Medicaid guidelines, billing all therapy as therapeutic-exercise)  CPT Intervention Performed   Today Duration / Intensity   TE Soft Tissue Mobilization  X     x Bilateral lumbar paraspinals, gluteal, and piriformis musculature  PROM IR/ER bilateral hips     Recumbent Bike x 6', level PRN     Lower trunk rotations x 3'     SKTC x 10x, 10" holds each LE      Figure 4 stretch x 3 x 30" holds each LE     Sidelying clams x 3 x 10, each LE     Straight leg raises x 2 x 10, each LE     Abdominal bracing x 3 x 10     Pelvic Tilts x 3 x 10     bridges x 2 x 10     HEP updated and texted to patient x See Patient Instructions for details              PLAN Standing hip 3-way,              CPT Codes available for Billing:   (53) minutes of Therapeutic Exercise (TE) to develop strength, " endurance, range of motion, and flexibility.    Assessment & Plan   Assessment: Patient tolerated treatment well. He completed exercises with only minimal difficulty and without complaint. Good overall form noted with exercises, with only initial cues required. He responded well to manual therapy and reported feeling great post treatment today.       Patient will continue to benefit from skilled outpatient physical therapy to address the deficits listed in the problem list box on initial evaluation, provide pt/family education and to maximize pt's level of independence in the home and community environment.     Patient's spiritual, cultural, and educational needs considered and patient agreeable to plan of care and goals.           Plan: Continue Plan of Care (POC) and progress per patient tolerance. See treatment section for details on planned progressions next session.    Goals:     Beverly Ray, PT, DPT

## 2025-02-28 ENCOUNTER — TELEPHONE (OUTPATIENT)
Dept: HEPATOLOGY | Facility: CLINIC | Age: 60
End: 2025-02-28
Payer: MEDICAID

## 2025-02-28 ENCOUNTER — TELEPHONE (OUTPATIENT)
Dept: REHABILITATION | Facility: HOSPITAL | Age: 60
End: 2025-02-28
Payer: MEDICAID

## 2025-02-28 NOTE — TELEPHONE ENCOUNTER
Patient scheduled for a visit with PA Scheuermann and MRI on 2/27.  He was a no-show.  I noted that he moved MRI to 4/4.  I called him.  Visit with provider scheduled again on 4/4 and reminder notice mailed.

## 2025-02-28 NOTE — TELEPHONE ENCOUNTER
Called regarding missing appointment today. Patient states that it was discussed that his frequency of physical therapy was decreased to 1x/week. PTA noticed that he has no appointments scheduled for the next 2 week. PTA offered to get patient added to the schedule but patient declines. His next appointment was confirmed.   
yes

## 2025-04-10 ENCOUNTER — TELEPHONE (OUTPATIENT)
Dept: HEPATOLOGY | Facility: CLINIC | Age: 60
End: 2025-04-10
Payer: MEDICAID

## 2025-04-10 NOTE — TELEPHONE ENCOUNTER
Patient was a no-show for f/u visit with PA Scheuermann on 4/4/25.  I noted that he moved his MRI to 5/2/25.  Attempt made to reach for rescheduling of visit with provider.  I left a VM and sent a letter asking that he call hepatology back.

## 2025-04-24 ENCOUNTER — TELEPHONE (OUTPATIENT)
Dept: REHABILITATION | Facility: HOSPITAL | Age: 60
End: 2025-04-24
Payer: MEDICAID

## 2025-04-24 NOTE — TELEPHONE ENCOUNTER
Called patient to discuss PT appointment on 5/1. Let him know that it will have to be another assessment/evaluation due to patient cancelling all visits since 2/26/25. Discussed with patient that for him to see progress with PT he would need to be able to attend visits regularly about 1-2 times per week and asked if this was feasible for him. He expressed understanding and said that it will be and that he would like to keep appointment on 5/1/25 at this time.   Beverly Ray, PT, DPT

## 2025-05-23 ENCOUNTER — TELEPHONE (OUTPATIENT)
Dept: HEPATOLOGY | Facility: CLINIC | Age: 60
End: 2025-05-23
Payer: MEDICAID

## 2025-05-23 NOTE — TELEPHONE ENCOUNTER
Patient ordered to have a MRI and a visit with PA Scheuermann to discuss results.  He has been scheduled multiple times since 9/13/24 and has either no-showed or cancelled appts.

## 2025-06-09 ENCOUNTER — TELEPHONE (OUTPATIENT)
Dept: HEPATOLOGY | Facility: CLINIC | Age: 60
End: 2025-06-09
Payer: MEDICAID

## 2025-06-09 NOTE — TELEPHONE ENCOUNTER
I spoke with staff in the referral dept.  They will attempt to get MRI approval date extended.  Attempt made to reach patient/wife.  I left a VM informing them of the above.  I asked that they call a few days before test to make sure auth is in place.   DISCHARGE

## 2025-06-09 NOTE — TELEPHONE ENCOUNTER
----- Message from Red Hinton sent at 6/6/2025  5:06 PM CDT -----    ----- Message -----  From: Alondra Yip  Sent: 6/6/2025   5:01 PM CDT  To: Scheuermann Jennifer B Staff    CC Call Back Name Of Caller:Madhav INFANTE   Does patient feel the need to be seen today?no   Relationship to the Pt?:pt   Contact Preference?:[phone]  What is the nature of the call?:Pt has an appointment scheduled for his MRI on 06/22 and is calling to get the referral back valid back in system due to the referral expiring on the date he was last scheduled.Pt states she would like a call back to confirm the appointment is secured and the referral is valid in the system so there isn't any confusion when they go since they do live a few hours away. Pt states to please call back with further assistance.

## 2025-06-18 ENCOUNTER — DOCUMENTATION ONLY (OUTPATIENT)
Dept: REHABILITATION | Facility: HOSPITAL | Age: 60
End: 2025-06-18
Payer: MEDICAID

## 2025-06-18 NOTE — PROGRESS NOTES
[facility]  Physical Therapy Discharge Note    Name: Madhav Ruiz  [facility] Number: [id number]    Therapy Diagnosis:        Encounter Diagnoses   Name Primary?    Decreased strength, endurance, and mobility Yes    Chronic bilateral low back pain without sciatica      Bilateral hip pain      Decreased range of motion of lumbar spine        Physician: Marvin Nunes MD     Physician Orders: Eval and Treat  Medical Diagnosis: Degeneration of intervertebral disc of lumbosacral region with discogenic back pain and lower extremity pain [M51.372], Facet arthritis of lumbosacral region [M47.817], Lumbar radiculopathy [M54.16]     Date of Evaluation:  2/25/2025      Date of Last visit: 2/26/2025  Total Visits Received: 2    ASSESSMENT      Patient did not return for final assessment and is considered DC at this time.    Discharge reason: Patient has not attended therapy since 2/26/2025    Discharge FOTO Score: N/A    Goals:   Short Term Goals:  3 weeks Status  Date Met   PAIN: Pt will report worst pain of 7/10 in order to progress toward max functional ability and improve quality of life. [x] Progressing  [] Met  [] Not Met     MOBILITY: Patient will improve AROM to 50% of stated goals, listed in objective measures above, in order to progress towards independence with functional activities.  [x] Progressing  [] Met  [] Not Met     STRENGTH: Patient will improve strength to 50% of stated goals, listed in objective measures above, in order to progress towards independence with functional activities. [x] Progressing  [] Met  [] Not Met     POSTURE: Patient will correct postural deviations in sitting and standing, to decrease pain and promote long term stability.  [x] Progressing  [] Met  [] Not Met     GAIT: Patient will demonstrate improved gait mechanics in order to improve functional mobility, improve quality of life, and decrease risk of further injury or fall.  [x] Progressing  [] Met  [] Not Met      HEP: Patient will demonstrate independence with HEP in order to progress toward functional independence. [x] Progressing  [] Met  [] Not Met        Long Term Goals:  6 weeks Status Date Met   PAIN: Pt will report worst pain of 5/10 in order to progress toward max functional ability and improve quality of life [x] Progressing  [] Met  [] Not Met     FUNCTION: Patient will demonstrate improved function as indicated by a score of greater than or equal to 51 out of 100 on FOTO. [x] Progressing  [] Met  [] Not Met     MOBILITY: Patient will improve AROM to stated goals, listed in objective measures above, in order to return to maximal functional potential and improve quality of life.  [x] Progressing  [] Met  [] Not Met     STRENGTH: Patient will improve strength to stated goals, listed in objective measures above, in order to improve functional independence and quality of life.  [x] Progressing  [] Met  [] Not Met     GAIT: Patient will demonstrate normalized gait mechanics with minimal compensation in order to return to PLOF. [x] Progressing  [] Met  [] Not Met     Patient will return to normal ADL's, IADL's, community involvement, recreational activities, and work-related activities with less than or equal to 3/10 pain and maximal function.  [x] Progressing  [] Met  [] Not Met         PLAN   This patient is discharged from Physical Therapy      Beverly Ray, PT, DPT

## 2025-08-11 ENCOUNTER — TELEPHONE (OUTPATIENT)
Dept: HEPATOLOGY | Facility: CLINIC | Age: 60
End: 2025-08-11
Payer: MEDICAID